# Patient Record
Sex: FEMALE | Race: WHITE | NOT HISPANIC OR LATINO | Employment: OTHER | URBAN - METROPOLITAN AREA
[De-identification: names, ages, dates, MRNs, and addresses within clinical notes are randomized per-mention and may not be internally consistent; named-entity substitution may affect disease eponyms.]

---

## 2017-01-11 ENCOUNTER — OFFICE VISIT - HEALTHEAST (OUTPATIENT)
Dept: INTERNAL MEDICINE | Facility: CLINIC | Age: 72
End: 2017-01-11

## 2017-01-11 DIAGNOSIS — L98.9 SKIN ABNORMALITIES: ICD-10-CM

## 2017-01-11 DIAGNOSIS — E78.5 HYPERLIPEMIA: ICD-10-CM

## 2017-01-11 DIAGNOSIS — M81.0 OSTEOPOROSIS: ICD-10-CM

## 2017-01-11 DIAGNOSIS — Z00.00 HEALTHCARE MAINTENANCE: ICD-10-CM

## 2017-01-11 DIAGNOSIS — I10 HYPERTENSION: ICD-10-CM

## 2017-01-11 LAB
CHOLEST SERPL-MCNC: 201 MG/DL
FASTING STATUS PATIENT QL REPORTED: YES
HDLC SERPL-MCNC: 79 MG/DL
LDLC SERPL CALC-MCNC: 106 MG/DL
TRIGL SERPL-MCNC: 78 MG/DL

## 2017-01-11 ASSESSMENT — MIFFLIN-ST. JEOR: SCORE: 1198.83

## 2017-01-13 ENCOUNTER — COMMUNICATION - HEALTHEAST (OUTPATIENT)
Dept: INTERNAL MEDICINE | Facility: CLINIC | Age: 72
End: 2017-01-13

## 2017-01-17 ENCOUNTER — RECORDS - HEALTHEAST (OUTPATIENT)
Dept: ADMINISTRATIVE | Facility: OTHER | Age: 72
End: 2017-01-17

## 2017-01-18 ENCOUNTER — COMMUNICATION - HEALTHEAST (OUTPATIENT)
Dept: ADMINISTRATIVE | Facility: CLINIC | Age: 72
End: 2017-01-18

## 2017-06-06 ENCOUNTER — RECORDS - HEALTHEAST (OUTPATIENT)
Dept: ADMINISTRATIVE | Facility: OTHER | Age: 72
End: 2017-06-06

## 2017-06-06 ENCOUNTER — RECORDS - HEALTHEAST (OUTPATIENT)
Dept: BONE DENSITY | Facility: CLINIC | Age: 72
End: 2017-06-06

## 2017-06-06 DIAGNOSIS — M81.0 AGE-RELATED OSTEOPOROSIS WITHOUT CURRENT PATHOLOGICAL FRACTURE: ICD-10-CM

## 2017-06-06 DIAGNOSIS — Z78.0 ASYMPTOMATIC MENOPAUSAL STATE: ICD-10-CM

## 2017-06-29 ENCOUNTER — HOSPITAL ENCOUNTER (OUTPATIENT)
Dept: MAMMOGRAPHY | Facility: HOSPITAL | Age: 72
Discharge: HOME | End: 2017-06-29
Attending: INTERNAL MEDICINE

## 2017-06-29 DIAGNOSIS — Z12.31 VISIT FOR SCREENING MAMMOGRAM: ICD-10-CM

## 2017-07-06 ENCOUNTER — COMMUNICATION - HEALTHEAST (OUTPATIENT)
Dept: INTERNAL MEDICINE | Facility: CLINIC | Age: 72
End: 2017-07-06

## 2017-07-06 DIAGNOSIS — I10 UNSPECIFIED ESSENTIAL HYPERTENSION: ICD-10-CM

## 2017-07-06 DIAGNOSIS — E78.5 HYPERLIPEMIA: ICD-10-CM

## 2017-07-13 ENCOUNTER — OFFICE VISIT - HEALTHEAST (OUTPATIENT)
Dept: INTERNAL MEDICINE | Facility: CLINIC | Age: 72
End: 2017-07-13

## 2017-07-13 DIAGNOSIS — M81.0 OSTEOPOROSIS: ICD-10-CM

## 2018-01-18 ENCOUNTER — AMBULATORY - HEALTHEAST (OUTPATIENT)
Dept: NURSING | Facility: CLINIC | Age: 73
End: 2018-01-18

## 2018-01-18 DIAGNOSIS — M81.0 OSTEOPOROSIS: ICD-10-CM

## 2018-03-28 ENCOUNTER — COMMUNICATION - HEALTHEAST (OUTPATIENT)
Dept: INTERNAL MEDICINE | Facility: CLINIC | Age: 73
End: 2018-03-28

## 2018-03-28 DIAGNOSIS — I10 ESSENTIAL HYPERTENSION: ICD-10-CM

## 2018-04-04 ENCOUNTER — COMMUNICATION - HEALTHEAST (OUTPATIENT)
Dept: INTERNAL MEDICINE | Facility: CLINIC | Age: 73
End: 2018-04-04

## 2018-04-04 DIAGNOSIS — E78.5 HYPERLIPEMIA: ICD-10-CM

## 2018-06-18 ENCOUNTER — OFFICE VISIT - HEALTHEAST (OUTPATIENT)
Dept: INTERNAL MEDICINE | Facility: CLINIC | Age: 73
End: 2018-06-18

## 2018-06-18 DIAGNOSIS — M54.50 RIGHT-SIDED LOW BACK PAIN WITHOUT SCIATICA, UNSPECIFIED CHRONICITY: ICD-10-CM

## 2018-07-01 ENCOUNTER — COMMUNICATION - HEALTHEAST (OUTPATIENT)
Dept: INTERNAL MEDICINE | Facility: CLINIC | Age: 73
End: 2018-07-01

## 2018-07-01 DIAGNOSIS — E78.5 HYPERLIPEMIA: ICD-10-CM

## 2018-07-10 ENCOUNTER — HOSPITAL ENCOUNTER (OUTPATIENT)
Dept: MAMMOGRAPHY | Facility: CLINIC | Age: 73
Discharge: HOME | End: 2018-07-10
Attending: INTERNAL MEDICINE

## 2018-07-10 DIAGNOSIS — Z12.31 VISIT FOR SCREENING MAMMOGRAM: ICD-10-CM

## 2018-07-18 ENCOUNTER — OFFICE VISIT - HEALTHEAST (OUTPATIENT)
Dept: INTERNAL MEDICINE | Facility: CLINIC | Age: 73
End: 2018-07-18

## 2018-07-18 DIAGNOSIS — M54.16 LUMBAR RADICULOPATHY: ICD-10-CM

## 2018-07-18 DIAGNOSIS — R20.0 LEFT LEG NUMBNESS: ICD-10-CM

## 2018-07-18 DIAGNOSIS — M81.0 AGE-RELATED OSTEOPOROSIS WITHOUT CURRENT PATHOLOGICAL FRACTURE: ICD-10-CM

## 2018-07-18 ASSESSMENT — MIFFLIN-ST. JEOR: SCORE: 1188.83

## 2018-07-23 ENCOUNTER — COMMUNICATION - HEALTHEAST (OUTPATIENT)
Dept: PHYSICAL MEDICINE AND REHAB | Facility: CLINIC | Age: 73
End: 2018-07-23

## 2018-07-23 ENCOUNTER — HOSPITAL ENCOUNTER (OUTPATIENT)
Dept: PHYSICAL MEDICINE AND REHAB | Facility: CLINIC | Age: 73
Discharge: HOME | End: 2018-07-23
Attending: INTERNAL MEDICINE

## 2018-07-23 DIAGNOSIS — M81.0 AGE-RELATED OSTEOPOROSIS WITHOUT CURRENT PATHOLOGICAL FRACTURE: ICD-10-CM

## 2018-07-23 DIAGNOSIS — M54.16 LUMBAR RADICULITIS: ICD-10-CM

## 2018-07-23 DIAGNOSIS — M79.18 MYOFASCIAL PAIN: ICD-10-CM

## 2018-07-23 ASSESSMENT — MIFFLIN-ST. JEOR: SCORE: 1196.54

## 2018-09-11 ENCOUNTER — HOSPITAL ENCOUNTER (OUTPATIENT)
Dept: PHYSICAL MEDICINE AND REHAB | Facility: CLINIC | Age: 73
Discharge: HOME | End: 2018-09-11
Attending: NURSE PRACTITIONER

## 2018-09-11 DIAGNOSIS — M54.16 LUMBAR RADICULITIS: ICD-10-CM

## 2018-09-23 ENCOUNTER — COMMUNICATION - HEALTHEAST (OUTPATIENT)
Dept: INTERNAL MEDICINE | Facility: CLINIC | Age: 73
End: 2018-09-23

## 2018-09-23 DIAGNOSIS — I10 ESSENTIAL HYPERTENSION: ICD-10-CM

## 2019-04-02 ENCOUNTER — COMMUNICATION - HEALTHEAST (OUTPATIENT)
Dept: INTERNAL MEDICINE | Facility: CLINIC | Age: 74
End: 2019-04-02

## 2019-04-08 ENCOUNTER — COMMUNICATION - HEALTHEAST (OUTPATIENT)
Dept: INTERNAL MEDICINE | Facility: CLINIC | Age: 74
End: 2019-04-08

## 2019-04-17 ENCOUNTER — COMMUNICATION - HEALTHEAST (OUTPATIENT)
Dept: INTERNAL MEDICINE | Facility: CLINIC | Age: 74
End: 2019-04-17

## 2019-04-18 ENCOUNTER — AMBULATORY - HEALTHEAST (OUTPATIENT)
Dept: NURSING | Facility: CLINIC | Age: 74
End: 2019-04-18

## 2019-06-10 ENCOUNTER — RECORDS - HEALTHEAST (OUTPATIENT)
Dept: ADMINISTRATIVE | Facility: OTHER | Age: 74
End: 2019-06-10

## 2019-06-10 ENCOUNTER — RECORDS - HEALTHEAST (OUTPATIENT)
Dept: BONE DENSITY | Facility: CLINIC | Age: 74
End: 2019-06-10

## 2019-06-10 DIAGNOSIS — M81.0 AGE-RELATED OSTEOPOROSIS WITHOUT CURRENT PATHOLOGICAL FRACTURE: ICD-10-CM

## 2019-06-18 ENCOUNTER — COMMUNICATION - HEALTHEAST (OUTPATIENT)
Dept: INTERNAL MEDICINE | Facility: CLINIC | Age: 74
End: 2019-06-18

## 2019-06-18 DIAGNOSIS — E78.5 HYPERLIPEMIA: ICD-10-CM

## 2019-07-15 ENCOUNTER — HOSPITAL ENCOUNTER (OUTPATIENT)
Dept: MAMMOGRAPHY | Facility: CLINIC | Age: 74
Discharge: HOME | End: 2019-07-15
Attending: INTERNAL MEDICINE

## 2019-07-15 DIAGNOSIS — Z12.31 VISIT FOR SCREENING MAMMOGRAM: ICD-10-CM

## 2019-09-18 ENCOUNTER — COMMUNICATION - HEALTHEAST (OUTPATIENT)
Dept: INTERNAL MEDICINE | Facility: CLINIC | Age: 74
End: 2019-09-18

## 2019-09-18 DIAGNOSIS — I10 ESSENTIAL HYPERTENSION: ICD-10-CM

## 2019-10-18 ENCOUNTER — OFFICE VISIT - HEALTHEAST (OUTPATIENT)
Dept: INTERNAL MEDICINE | Facility: CLINIC | Age: 74
End: 2019-10-18

## 2019-10-18 DIAGNOSIS — M81.0 AGE-RELATED OSTEOPOROSIS WITHOUT CURRENT PATHOLOGICAL FRACTURE: ICD-10-CM

## 2019-10-18 DIAGNOSIS — I10 ESSENTIAL HYPERTENSION: ICD-10-CM

## 2019-10-18 DIAGNOSIS — Z00.00 ROUTINE GENERAL MEDICAL EXAMINATION AT A HEALTH CARE FACILITY: ICD-10-CM

## 2019-10-18 DIAGNOSIS — E78.5 HYPERLIPIDEMIA, UNSPECIFIED HYPERLIPIDEMIA TYPE: ICD-10-CM

## 2019-10-18 LAB
ALBUMIN SERPL-MCNC: 4.2 G/DL (ref 3.5–5)
ALBUMIN UR-MCNC: NEGATIVE MG/DL
ALP SERPL-CCNC: 71 U/L (ref 45–120)
ALT SERPL W P-5'-P-CCNC: 15 U/L (ref 0–45)
ANION GAP SERPL CALCULATED.3IONS-SCNC: 9 MMOL/L (ref 5–18)
APPEARANCE UR: CLEAR
AST SERPL W P-5'-P-CCNC: 23 U/L (ref 0–40)
BACTERIA #/AREA URNS HPF: ABNORMAL HPF
BILIRUB SERPL-MCNC: 0.6 MG/DL (ref 0–1)
BILIRUB UR QL STRIP: NEGATIVE
BUN SERPL-MCNC: 19 MG/DL (ref 8–28)
CALCIUM SERPL-MCNC: 10.4 MG/DL (ref 8.5–10.5)
CHLORIDE BLD-SCNC: 102 MMOL/L (ref 98–107)
CHOLEST SERPL-MCNC: 189 MG/DL
CO2 SERPL-SCNC: 30 MMOL/L (ref 22–31)
COLOR UR AUTO: YELLOW
CREAT SERPL-MCNC: 1.03 MG/DL (ref 0.6–1.1)
ERYTHROCYTE [DISTWIDTH] IN BLOOD BY AUTOMATED COUNT: 10.7 % (ref 11–14.5)
FASTING STATUS PATIENT QL REPORTED: YES
GFR SERPL CREATININE-BSD FRML MDRD: 52 ML/MIN/1.73M2
GLUCOSE BLD-MCNC: 102 MG/DL (ref 70–125)
GLUCOSE UR STRIP-MCNC: NEGATIVE MG/DL
HCT VFR BLD AUTO: 43.1 % (ref 35–47)
HDLC SERPL-MCNC: 87 MG/DL
HGB BLD-MCNC: 14.3 G/DL (ref 12–16)
HGB UR QL STRIP: ABNORMAL
KETONES UR STRIP-MCNC: ABNORMAL MG/DL
LDLC SERPL CALC-MCNC: 89 MG/DL
LEUKOCYTE ESTERASE UR QL STRIP: ABNORMAL
MCH RBC QN AUTO: 32.4 PG (ref 27–34)
MCHC RBC AUTO-ENTMCNC: 33.2 G/DL (ref 32–36)
MCV RBC AUTO: 98 FL (ref 80–100)
NITRATE UR QL: NEGATIVE
PH UR STRIP: 6 [PH] (ref 5–8)
PLATELET # BLD AUTO: 266 THOU/UL (ref 140–440)
PMV BLD AUTO: 7.2 FL (ref 7–10)
POTASSIUM BLD-SCNC: 4.1 MMOL/L (ref 3.5–5)
PROT SERPL-MCNC: 7.4 G/DL (ref 6–8)
RBC # BLD AUTO: 4.4 MILL/UL (ref 3.8–5.4)
RBC #/AREA URNS AUTO: ABNORMAL HPF
SODIUM SERPL-SCNC: 141 MMOL/L (ref 136–145)
SP GR UR STRIP: 1.02 (ref 1–1.03)
SQUAMOUS #/AREA URNS AUTO: ABNORMAL LPF
TRIGL SERPL-MCNC: 64 MG/DL
UROBILINOGEN UR STRIP-ACNC: ABNORMAL
WBC #/AREA URNS AUTO: ABNORMAL HPF
WBC CLUMPS #/AREA URNS HPF: PRESENT /[HPF]
WBC: 5.7 THOU/UL (ref 4–11)

## 2019-10-18 ASSESSMENT — MIFFLIN-ST. JEOR: SCORE: 1160.85

## 2019-10-19 LAB — BACTERIA SPEC CULT: NO GROWTH

## 2019-10-21 ENCOUNTER — COMMUNICATION - HEALTHEAST (OUTPATIENT)
Dept: INTERNAL MEDICINE | Facility: CLINIC | Age: 74
End: 2019-10-21

## 2019-10-21 DIAGNOSIS — R31.29 MICROSCOPIC HEMATURIA: ICD-10-CM

## 2019-10-21 LAB — 25(OH)D3 SERPL-MCNC: 40.2 NG/ML (ref 30–80)

## 2019-10-22 ENCOUNTER — COMMUNICATION - HEALTHEAST (OUTPATIENT)
Dept: INTERNAL MEDICINE | Facility: CLINIC | Age: 74
End: 2019-10-22

## 2019-10-29 ENCOUNTER — RECORDS - HEALTHEAST (OUTPATIENT)
Dept: ADMINISTRATIVE | Facility: OTHER | Age: 74
End: 2019-10-29

## 2020-02-12 ENCOUNTER — AMBULATORY - HEALTHEAST (OUTPATIENT)
Dept: INTERNAL MEDICINE | Facility: CLINIC | Age: 75
End: 2020-02-12

## 2020-02-12 DIAGNOSIS — M81.0 AGE-RELATED OSTEOPOROSIS WITHOUT CURRENT PATHOLOGICAL FRACTURE: ICD-10-CM

## 2020-02-12 DIAGNOSIS — Z92.29 PERSONAL HISTORY OF OTHER DRUG THERAPY: ICD-10-CM

## 2020-03-17 ENCOUNTER — COMMUNICATION - HEALTHEAST (OUTPATIENT)
Dept: INTERNAL MEDICINE | Facility: CLINIC | Age: 75
End: 2020-03-17

## 2020-03-17 DIAGNOSIS — E78.5 HYPERLIPEMIA: ICD-10-CM

## 2020-09-12 ENCOUNTER — COMMUNICATION - HEALTHEAST (OUTPATIENT)
Dept: INTERNAL MEDICINE | Facility: CLINIC | Age: 75
End: 2020-09-12

## 2020-09-12 DIAGNOSIS — I10 ESSENTIAL HYPERTENSION: ICD-10-CM

## 2020-12-08 ENCOUNTER — COMMUNICATION - HEALTHEAST (OUTPATIENT)
Dept: INTERNAL MEDICINE | Facility: CLINIC | Age: 75
End: 2020-12-08

## 2020-12-08 DIAGNOSIS — E78.5 HYPERLIPEMIA: ICD-10-CM

## 2020-12-17 ENCOUNTER — COMMUNICATION - HEALTHEAST (OUTPATIENT)
Dept: INTERNAL MEDICINE | Facility: CLINIC | Age: 75
End: 2020-12-17

## 2020-12-17 DIAGNOSIS — I10 ESSENTIAL HYPERTENSION: ICD-10-CM

## 2021-01-08 ENCOUNTER — COMMUNICATION - HEALTHEAST (OUTPATIENT)
Dept: SCHEDULING | Facility: CLINIC | Age: 76
End: 2021-01-08

## 2021-01-13 ENCOUNTER — OFFICE VISIT - HEALTHEAST (OUTPATIENT)
Dept: INTERNAL MEDICINE | Facility: CLINIC | Age: 76
End: 2021-01-13

## 2021-01-13 DIAGNOSIS — M81.0 AGE-RELATED OSTEOPOROSIS WITHOUT CURRENT PATHOLOGICAL FRACTURE: ICD-10-CM

## 2021-01-13 DIAGNOSIS — I10 ESSENTIAL HYPERTENSION: ICD-10-CM

## 2021-01-13 DIAGNOSIS — I87.2 VENOUS (PERIPHERAL) INSUFFICIENCY: ICD-10-CM

## 2021-02-02 ENCOUNTER — RECORDS - HEALTHEAST (OUTPATIENT)
Dept: VASCULAR ULTRASOUND | Facility: CLINIC | Age: 76
End: 2021-02-02

## 2021-02-02 ENCOUNTER — OFFICE VISIT - HEALTHEAST (OUTPATIENT)
Dept: VASCULAR SURGERY | Facility: CLINIC | Age: 76
End: 2021-02-02

## 2021-02-02 DIAGNOSIS — M79.669 PAIN IN UNSPECIFIED LOWER LEG: ICD-10-CM

## 2021-02-02 DIAGNOSIS — M79.669 PAIN AND SWELLING OF LOWER LEG, UNSPECIFIED LATERALITY: ICD-10-CM

## 2021-02-02 DIAGNOSIS — M79.89 OTHER SPECIFIED SOFT TISSUE DISORDERS: ICD-10-CM

## 2021-02-02 DIAGNOSIS — M79.89 PAIN AND SWELLING OF LOWER LEG, UNSPECIFIED LATERALITY: ICD-10-CM

## 2021-02-19 ENCOUNTER — AMBULATORY - HEALTHEAST (OUTPATIENT)
Dept: NURSING | Facility: CLINIC | Age: 76
End: 2021-02-19

## 2021-03-12 ENCOUNTER — AMBULATORY - HEALTHEAST (OUTPATIENT)
Dept: NURSING | Facility: CLINIC | Age: 76
End: 2021-03-12

## 2021-03-14 ENCOUNTER — COMMUNICATION - HEALTHEAST (OUTPATIENT)
Dept: INTERNAL MEDICINE | Facility: CLINIC | Age: 76
End: 2021-03-14

## 2021-03-14 DIAGNOSIS — I10 ESSENTIAL HYPERTENSION: ICD-10-CM

## 2021-03-15 RX ORDER — ATENOLOL 25 MG/1
TABLET ORAL
Qty: 90 TABLET | Refills: 3 | Status: SHIPPED | OUTPATIENT
Start: 2021-03-15 | End: 2022-02-28

## 2021-03-17 ENCOUNTER — COMMUNICATION - HEALTHEAST (OUTPATIENT)
Dept: INTERNAL MEDICINE | Facility: CLINIC | Age: 76
End: 2021-03-17

## 2021-03-17 DIAGNOSIS — E78.5 HYPERLIPEMIA: ICD-10-CM

## 2021-03-19 RX ORDER — SIMVASTATIN 10 MG
TABLET ORAL
Qty: 90 TABLET | Refills: 3 | Status: SHIPPED | OUTPATIENT
Start: 2021-03-19 | End: 2022-03-07

## 2021-05-25 ENCOUNTER — RECORDS - HEALTHEAST (OUTPATIENT)
Dept: ADMINISTRATIVE | Facility: CLINIC | Age: 76
End: 2021-05-25

## 2021-05-26 ENCOUNTER — RECORDS - HEALTHEAST (OUTPATIENT)
Dept: ADMINISTRATIVE | Facility: CLINIC | Age: 76
End: 2021-05-26

## 2021-05-27 ENCOUNTER — RECORDS - HEALTHEAST (OUTPATIENT)
Dept: ADMINISTRATIVE | Facility: CLINIC | Age: 76
End: 2021-05-27

## 2021-05-27 VITALS — SYSTOLIC BLOOD PRESSURE: 132 MMHG | HEART RATE: 76 BPM | DIASTOLIC BLOOD PRESSURE: 70 MMHG | TEMPERATURE: 97.9 F

## 2021-05-27 NOTE — TELEPHONE ENCOUNTER
Spoke with patient and helped her schedule physical and prolia after next. Patient already scheduled next prolia.  Guerita Mondragon CMA ............... 2:15 PM, 04/02/19

## 2021-05-27 NOTE — TELEPHONE ENCOUNTER
"Prolia Injection Phone Screen      Screening questions have been asked 2-3 days prior to administration visit for Prolia. If any questions are answered with \"Yes,\" this phone encounter were will routed to ordering provider for further evaluation.     1.  When was the last injection?  7/18/18    2.  Has insurance for this injection been verified?  Yes    3.  Did you experience any new onset achiness or rashes that lasted for over a month with your previous Prolia injection?   No    4.  Do you have a fever over 101?F or a new deep cough that is unusual for you today? No    5.  Have you started any new medications in the last 6 months that you were told could affect your immune system? These may have been prescribed by oncologist, transplant, rheumatology, or dermatology.   No    6.  In the last 6 months have you have gastric bypass or parathyroid surgery?   No    7.  Do you plan dental work requiring drilling into the bone such as implants/extractions or oral surgery in the next 2-3 months?   No    8. Do you have new insurance since the last injection? Yes no change.     Patient informed if symptoms discussed above present prior to their administration appointment, they are to notify clinic immediately.   Negin Vences CMA  2:00 PM  4/17/2019        "

## 2021-05-27 NOTE — PROGRESS NOTES
The following steps were completed to comply with the REMS program for Prolia:  1. Ordering provider has previously reviewed information in the Medication Guide and Patient Counseling Chart, including the serious risks of Prolia  and the symptoms of each risk and have been advised to seek prompt medical attention if they have signs or symptoms of any of the serious risks.  2. Provided each patient a copy of the Medication Guide and Patient Brochure.  See MAR for administration details.   Indication: Prolia  (denosumab) is a prescription medicine used to treat osteoporosis in patients who:   Are at high risk for fracture, meaning patients who have had a fracture related to osteoporosis, or who have multiple risk factors for fracture; Cannot use another osteoporosis medicine or other osteoporosis medicines did not work well.   The timeline for early/late injections would be 4 weeks early and any time after the 6 month samantha. If a patient receives their injection late, then the subsequent injection would be 6 months from the date that they actually received the injection    Have the screening questions been asked prior to this administration? Yes

## 2021-05-27 NOTE — TELEPHONE ENCOUNTER
Who is calling:  pt  Reason for Call:  Pt trying to confirm the date of her last prolia injection, and trying to figure out when she should schedule the next one.  Date of last appointment with primary care: 7.23.2018  Okay to leave a detailed message: Yes

## 2021-05-28 ENCOUNTER — RECORDS - HEALTHEAST (OUTPATIENT)
Dept: ADMINISTRATIVE | Facility: CLINIC | Age: 76
End: 2021-05-28

## 2021-05-29 NOTE — TELEPHONE ENCOUNTER
Due to be seen    Rx renewed per Medication Renewal Policy. Medication was last renewed on 7/2/18.    Deepika Massey, [facility] Triage/Med Refill 6/18/2019     Requested Prescriptions   Pending Prescriptions Disp Refills     simvastatin (ZOCOR) 10 MG tablet [Pharmacy Med Name: Simvastatin Oral Tablet 10 MG] 90 tablet 2     Sig: TAKE 1 TABLET BY MOUTH EVERY NIGHT AT BEDTIME       Statins Refill Protocol (Hmg CoA Reductase Inhibitors) Passed - 6/18/2019  7:00 AM        Passed - PCP or prescribing provider visit in past 12 months      Last office visit with prescriber/PCP: 7/18/2018 Kushal Motley MD OR same dept: 7/18/2018 Kushal Motley MD OR same specialty: 7/18/2018 Kushal Motley MD  Last physical: 1/11/2017 Last MTM visit: Visit date not found   Next visit within 3 mo: Visit date not found  Next physical within 3 mo: Visit date not found  Prescriber OR PCP: Kushal Motley MD  Last diagnosis associated with med order: 1. Hyperlipemia  - simvastatin (ZOCOR) 10 MG tablet [Pharmacy Med Name: Simvastatin Oral Tablet 10 MG]; TAKE 1 TABLET BY MOUTH EVERY NIGHT AT BEDTIME  Dispense: 90 tablet; Refill: 2    If protocol passes may refill for 12 months if within 3 months of last provider visit (or a total of 15 months).

## 2021-05-30 ENCOUNTER — RECORDS - HEALTHEAST (OUTPATIENT)
Dept: ADMINISTRATIVE | Facility: CLINIC | Age: 76
End: 2021-05-30

## 2021-05-30 VITALS — BODY MASS INDEX: 31.95 KG/M2 | WEIGHT: 169.2 LBS | HEIGHT: 61 IN

## 2021-05-31 ENCOUNTER — RECORDS - HEALTHEAST (OUTPATIENT)
Dept: ADMINISTRATIVE | Facility: CLINIC | Age: 76
End: 2021-05-31

## 2021-05-31 VITALS — BODY MASS INDEX: 31.47 KG/M2 | WEIGHT: 167.9 LBS

## 2021-06-01 VITALS — BODY MASS INDEX: 31.87 KG/M2 | HEIGHT: 61 IN | WEIGHT: 168.8 LBS

## 2021-06-01 VITALS — HEIGHT: 61 IN | BODY MASS INDEX: 31.55 KG/M2 | WEIGHT: 167.1 LBS

## 2021-06-01 VITALS — BODY MASS INDEX: 31.67 KG/M2 | WEIGHT: 169 LBS

## 2021-06-01 NOTE — TELEPHONE ENCOUNTER
RN cannot approve Refill Request    RN can NOT refill this medication Protocol failed and NO refill given.         Deepika Massey, [facility] Triage/Med Refill 9/18/2019    Requested Prescriptions   Pending Prescriptions Disp Refills     atenolol (TENORMIN) 25 MG tablet [Pharmacy Med Name: Atenolol Oral Tablet 25 MG] 90 tablet 3     Sig: Take 1 tablet (25 mg total) by mouth daily.       Beta-Blockers Refill Protocol Failed - 9/18/2019  7:01 AM        Failed - PCP or prescribing provider visit in past 12 months or next 3 months     Last office visit with prescriber/PCP: 7/18/2018 Kushal Motley MD OR same dept: Visit date not found OR same specialty: 7/18/2018 Kushal Motley MD  Last physical: 1/11/2017 Last MTM visit: Visit date not found   Next visit within 3 mo: Visit date not found  Next physical within 3 mo: Visit date not found  Prescriber OR PCP: Kushal Motley MD  Last diagnosis associated with med order: 1. Essential hypertension  - atenolol (TENORMIN) 25 MG tablet [Pharmacy Med Name: Atenolol Oral Tablet 25 MG]; Take 1 tablet (25 mg total) by mouth daily.  Dispense: 90 tablet; Refill: 2    If protocol passes may refill for 12 months if within 3 months of last provider visit (or a total of 15 months).             Failed - Blood pressure filed in past 12 months     BP Readings from Last 1 Encounters:   07/23/18 151/76

## 2021-06-02 ENCOUNTER — RECORDS - HEALTHEAST (OUTPATIENT)
Dept: BONE DENSITY | Facility: CLINIC | Age: 76
End: 2021-06-02

## 2021-06-02 ENCOUNTER — RECORDS - HEALTHEAST (OUTPATIENT)
Dept: ADMINISTRATIVE | Facility: OTHER | Age: 76
End: 2021-06-02

## 2021-06-02 ENCOUNTER — COMMUNICATION - HEALTHEAST (OUTPATIENT)
Dept: INTERNAL MEDICINE | Facility: CLINIC | Age: 76
End: 2021-06-02

## 2021-06-02 DIAGNOSIS — M81.0 AGE-RELATED OSTEOPOROSIS WITHOUT CURRENT PATHOLOGICAL FRACTURE: ICD-10-CM

## 2021-06-02 DIAGNOSIS — Z92.29 PERSONAL HISTORY OF OTHER DRUG THERAPY: ICD-10-CM

## 2021-06-02 NOTE — PROGRESS NOTES
Assessment and Plan:       1. Routine general medical examination at a health care facility  She refused any future colon cancer screenings and we discussed all options.  - Urinalysis-UC if Indicated  - Vitamin D, Total (25-Hydroxy)  - Lipid Profile  - HM2(CBC w/o Differential)  - Comprehensive Metabolic Panel    2. Hypertension    - Urinalysis-UC if Indicated  - Vitamin D, Total (25-Hydroxy)  - Lipid Profile  - HM2(CBC w/o Differential)  - Comprehensive Metabolic Panel    3. Hyperlipidemia, unspecified hyperlipidemia type    - Urinalysis-UC if Indicated  - Vitamin D, Total (25-Hydroxy)  - Lipid Profile  - HM2(CBC w/o Differential)  - Comprehensive Metabolic Panel    4. Age-related osteoporosis without current pathological fracture  Patient was educated on safety of Prolia utilizing Patient Counseling Chart for Healthcare Providers, as outlined by the Prolia REMS progam.     Prolia # 11 given today.  - Urinalysis-UC if Indicated  - Vitamin D, Total (25-Hydroxy)  - Lipid Profile  - HM2(CBC w/o Differential)  - Comprehensive Metabolic Panel     The patient's current medical problems were reviewed.    I have had an Advance Directives discussion with the patient.  The following health maintenance schedule was reviewed with the patient and provided in printed form in the after visit summary:   Health Maintenance   Topic Date Due     HEPATITIS C SCREENING  1945     ZOSTER VACCINES (2 of 3) 02/21/2008     COLONOSCOPY  03/12/2014     TD 18+ HE  12/27/2017     MEDICARE ANNUAL WELLNESS VISIT  01/11/2018     HYPERTENSION FOLLOW-UP  01/13/2018     FALL RISK ASSESSMENT  07/18/2019     INFLUENZA VACCINE RULE BASED (1) 08/01/2019     DXA SCAN  06/10/2021     MAMMOGRAM  07/15/2021     ADVANCE CARE PLANNING  01/11/2022     PNEUMOCOCCAL IMMUNIZATION 65+ LOW/MEDIUM RISK  Completed        Subjective:   Chief Complaint: Zahra Fitzgerald is an 74 y.o. female here for an Annual Wellness visit.   HPI:  Acute and chronic medical  problems discussed as above.    Review of Systems:    Please see above.  The rest of the review of systems are negative for all systems.    Patient Care Team:  Kushal Motley MD as PCP - General (Internal Medicine)  Kushal Motley MD as Assigned PCP     Patient Active Problem List   Diagnosis     Diverticulosis     Hyperlipemia     Hypertension     Obesity     Osteoporosis     No past medical history on file.   Past Surgical History:   Procedure Laterality Date     [place] APPENDECTOMY      Description: Appendectomy;  Recorded: 03/12/2009;      Family History   Problem Relation Age of Onset     Breast cancer Mother         Unsure of age     Breast cancer Maternal Aunt 65     Breast cancer Maternal Aunt 65     Breast cancer Sister 42      Social History     Socioeconomic History     Marital status:      Spouse name: Not on file     Number of children: Not on file     Years of education: Not on file     Highest education level: Not on file   Occupational History     Not on file   Social Needs     Financial resource strain: Not on file     Food insecurity:     Worry: Not on file     Inability: Not on file     Transportation needs:     Medical: Not on file     Non-medical: Not on file   Tobacco Use     Smoking status: Never Smoker   Substance and Sexual Activity     Alcohol use: Not on file     Drug use: Not on file     Sexual activity: Not on file   Lifestyle     Physical activity:     Days per week: Not on file     Minutes per session: Not on file     Stress: Not on file   Relationships     Social connections:     Talks on phone: Not on file     Gets together: Not on file     Attends Spiritism service: Not on file     Active member of club or organization: Not on file     Attends meetings of clubs or organizations: Not on file     Relationship status: Not on file     Intimate partner violence:     Fear of current or ex partner: Not on file     Emotionally abused: Not on file     Physically abused: Not on file     " Forced sexual activity: Not on file   Other Topics Concern     Not on file   Social History Narrative     Not on file      Current Outpatient Medications   Medication Sig Dispense Refill     aspirin 81 mg chewable tablet Chew 81 mg daily.       atenolol (TENORMIN) 25 MG tablet Take 1 tablet (25 mg total) by mouth daily. 90 tablet 3     black cohosh 540 mg cap Take by mouth. Take 1 capsule daily       calcium carbonate-vitamin D3 (CALCIUM 600 + D,3,) 600 mg(1,500mg) -400 unit per tablet Take 1 tablet by mouth daily.       denosumab 60 mg/mL Syrg Inject 60 mg under the skin once.       FOLIC ACID/MULTIVITS-MIN/LUT (CENTRUM SILVER ORAL) Take by mouth. womens ultra       MV,CA,MIN/FA/HERBAL NO.158 (ESTROVEN ENERGY ORAL) Take by mouth.       OMEGA-3/DHA/EPA/FISH OIL (FISH OIL-OMEGA-3 FATTY ACIDS) 300-1,000 mg capsule Take 1 g by mouth daily.       simvastatin (ZOCOR) 10 MG tablet TAKE 1 TABLET BY MOUTH EVERY NIGHT AT BEDTIME 90 tablet 2     vitamin A-vitamin C-vit E-min (OCUVITE) Tab tablet Take 2 tablets by mouth daily.       Current Facility-Administered Medications   Medication Dose Route Frequency Provider Last Rate Last Dose     denosumab 60 mg (PROLIA 60 mg/ml)  60 mg Subcutaneous Q6 Months Kushal Motley MD   60 mg at 04/18/19 0938      Objective:   Vital Signs:   Visit Vitals  /60   Pulse 84   Ht 5' 1\" (1.549 m)   Wt 161 lb 11.2 oz (73.3 kg)   SpO2 100%   BMI 30.55 kg/m         VisionScreening:  No exam data present     PHYSICAL EXAM  General Appearance: Alert, cooperative, no distress, appears stated age.  Head: Normocephalic, without obvious abnormality, atraumatic  Eyes: PERRL, conjunctiva/corneas clear, EOM's intact  Ears: Normal TM's and external ear canals, both ears  Nose: Nares normal, septum midline,mucosa normal, no drainage  Throat: Lips, mucosa, and tongue normal; teeth and gums normal  Neck: Supple, symmetrical, trachea midline, no adenopathy;  thyroid: not enlarged, symmetric, no " tenderness/mass/nodules; no carotid bruit or JVD  Back: Symmetric, no curvature, ROM normal, no CVA tenderness.  Lungs: Clear to auscultation bilaterally, respirations unlabored.  Breasts: No breast masses, tenderness, asymmetry, or nipple discharge.  Heart: Regular rate and rhythm, S1 and S2 normal, no murmur, rub, or gallop.  Abdomen: Soft, non-tender, bowel sounds active all four quadrants,  no masses, no organomegaly.  Pelvic:declined  Extremities: Extremities normal, atraumatic, no cyanosis or edema.  Skin: Skin color, texture, turgor normal, no rashes. She has a chronic lesion on the left ear lobe, for 4-5 years, looks like a scab. She saw Dermatologist in the past and they recommended some cream that she tried.  Lymph nodes: Cervical, supraclavicular, and axillary nodes normal.  Neurologic: No focal neurological findings.      Assessment Results 10/18/2019   Activities of Daily Living No help needed   Instrumental Activities of Daily Living No help needed   Mini Cog Total Score 4   Some recent data might be hidden     A Mini-Cog score of 0-2 suggests the possibility of dementia, score of 3-5 suggests no dementia    Identified Health Risks:     She is at risk for lack of exercise and has been provided with information to increase physical activity for the benefit of her well-being.  Patient's advanced directive was discussed and I am comfortable with the patient's wishes.

## 2021-06-02 NOTE — TELEPHONE ENCOUNTER
----- Message from Kushal Motley MD sent at 10/21/2019 12:34 PM CDT -----  Call the pt. Blood work is good. She can continue same medications. Urine test showed some blood in the urine. I would recommend to see [facility]. Ref is in.

## 2021-06-03 VITALS
HEART RATE: 84 BPM | WEIGHT: 161.7 LBS | DIASTOLIC BLOOD PRESSURE: 60 MMHG | BODY MASS INDEX: 30.53 KG/M2 | HEIGHT: 61 IN | OXYGEN SATURATION: 100 % | SYSTOLIC BLOOD PRESSURE: 118 MMHG

## 2021-06-05 VITALS
WEIGHT: 155.2 LBS | OXYGEN SATURATION: 100 % | HEART RATE: 86 BPM | BODY MASS INDEX: 29.32 KG/M2 | DIASTOLIC BLOOD PRESSURE: 64 MMHG | SYSTOLIC BLOOD PRESSURE: 136 MMHG

## 2021-06-06 NOTE — TELEPHONE ENCOUNTER
Refill Approved    Rx renewed per Medication Renewal Policy. Medication was last renewed on 6/18/19.    Deepika Massey, [facility] Triage/Med Refill 3/17/2020     Requested Prescriptions   Pending Prescriptions Disp Refills     simvastatin (ZOCOR) 10 MG tablet [Pharmacy Med Name: Simvastatin Oral Tablet 10 MG] 90 tablet 1     Sig: TAKE 1 TABLET BY MOUTH EVERY NIGHT AT BEDTIME       Statins Refill Protocol (Hmg CoA Reductase Inhibitors) Passed - 3/17/2020  7:01 AM        Passed - PCP or prescribing provider visit in past 12 months      Last office visit with prescriber/PCP: 7/18/2018 Kushal Motley MD OR same dept: Visit date not found OR same specialty: 7/18/2018 Kushal Motley MD  Last physical: 10/18/2019 Last MTM visit: Visit date not found   Next visit within 3 mo: Visit date not found  Next physical within 3 mo: Visit date not found  Prescriber OR PCP: Kushal Motley MD  Last diagnosis associated with med order: 1. Hyperlipemia  - simvastatin (ZOCOR) 10 MG tablet [Pharmacy Med Name: Simvastatin Oral Tablet 10 MG]; TAKE 1 TABLET BY MOUTH EVERY NIGHT AT BEDTIME  Dispense: 90 tablet; Refill: 1    If protocol passes may refill for 12 months if within 3 months of last provider visit (or a total of 15 months).

## 2021-06-08 NOTE — PROGRESS NOTES
Assessment:     Healthy female exam.   All preventive exams are up-to-date.  Fasting labs will be done today.  She will see Dermatologist for the biopsy of the lesion on her right ear lobe.  Prolia inj given today.    The following high BMI interventions were performed this visit: encouragement to exercise and weight monitoring  This note has been dictated using voice recognition software. Any grammatical or context distortions are unintentional and inherent to the software    1. Osteoporosis  HM2(CBC w/o Differential)    Comprehensive Metabolic Panel    Urinalysis-UC if Indicated    Vitamin D, Total (25-Hydroxy)    Ambulatory referral for Colonoscopy    Lipid Profile   2. Healthcare maintenance  HM2(CBC w/o Differential)    Comprehensive Metabolic Panel    Urinalysis-UC if Indicated    Vitamin D, Total (25-Hydroxy)    Ambulatory referral for Colonoscopy    Lipid Profile   3. Hypertension  HM2(CBC w/o Differential)    Comprehensive Metabolic Panel    Urinalysis-UC if Indicated    Vitamin D, Total (25-Hydroxy)    Ambulatory referral for Colonoscopy    Lipid Profile   4. Hyperlipemia  HM2(CBC w/o Differential)    Comprehensive Metabolic Panel    Urinalysis-UC if Indicated    Vitamin D, Total (25-Hydroxy)    Ambulatory referral for Colonoscopy    Lipid Profile   5. Skin abnormalities  Ambulatory referral to Dermatology         Patient Instructions   Prolia 6th today.  Prolia 7th in 6 months with my nurse. I will see you in 1 year.  DXA in 6/2017 .   Phone number to schedule [phone].  Please avoid any extensive dental work as implants and teeth extractions for the next 1-2 months.  Daily calcium need is 5060-7312 mg a day from the diet and supplements.  Calcium citrate is easier to digest.  Vitamin D 2000 IU daily recommended.      Plan:          Return in about 1 year (around 1/11/2018) for Annual physical.    Subjective:       Chief Complaint   Patient presents with     Annual Exam     dexa-6/3/2015 scheduled,  mammo-6/28/2016, colon-3/12/2004 q10 non fasting        Zahra Fitzgerald is a 71 y.o. female who presents for an annual exam.   Chronic medical problems reviewed.  She is due for Prolia treatment.    Healthy Habits:   Regular Exercise: Yes  Sunscreen Use: Yes  Healthy Diet: Yes  Dental Visits Regularly: Yes  Seat Belt: Yes  Immunization status: up to date and documented.    Health Maintenance   Topic Date Due     COLONOSCOPY  03/12/2014     INFLUENZA VACCINE RULE BASED (1) 08/01/2016     TD 18+ HE  12/27/2017     HYPERTENSION FOLLOW-UP  07/11/2017     FALL RISK ASSESSMENT  01/11/2018     MAMMOGRAM  06/28/2018     ADVANCE DIRECTIVES DISCUSSED WITH PATIENT  01/11/2022     PNEUMOCOCCAL POLYSACCHARIDE VACCINE AGE 65 AND OVER  Completed     PNEUMOCOCCAL CONJUGATE VACCINE FOR ADULTS (PCV13 OR PREVNAR)  Completed     ZOSTER VACCINE  Completed       Social History     Social History     Marital status:      Spouse name: N/A     Number of children: N/A     Years of education: N/A     Occupational History     Not on file.     Social History Main Topics     Smoking status: Never Smoker     Smokeless tobacco: Not on file     Alcohol use Not on file     Drug use: Not on file     Sexual activity: Not on file     Other Topics Concern     Not on file     Social History Narrative       Family History   Problem Relation Age of Onset     Breast cancer Mother      Breast cancer Maternal Aunt      Breast cancer Maternal Aunt      BRCA 1/2 Neg Hx      Endometrial cancer Neg Hx      Ovarian cancer Neg Hx        Patient Active Problem List   Diagnosis     Diverticulosis     Hyperlipidemia     Hypertension     Obesity     Osteoporosis       Current Outpatient Prescriptions on File Prior to Visit   Medication Sig Dispense Refill     aspirin 81 mg chewable tablet Chew 81 mg daily.       atenolol (TENORMIN) 25 MG tablet TAKE 1 TABLET BY MOUTH ONCE DAILY 90 tablet 3     black cohosh 540 mg cap Take by mouth. Take 1 capsule daily    "    calcium carbonate-vitamin D3 (CALCIUM 600 + D,3,) 600 mg(1,500mg) -400 unit per tablet Take 1 tablet by mouth daily.       denosumab 60 mg/mL Syrg Inject 60 mg under the skin once.       FOLIC ACID/MULTIVITS-MIN/LUT (CENTRUM SILVER ORAL) Take by mouth. womens ultra       MV,CA,MIN/FA/HERBAL NO.158 (ESTROVEN ENERGY ORAL) Take by mouth.       OMEGA-3/DHA/EPA/FISH OIL (FISH OIL-OMEGA-3 FATTY ACIDS) 300-1,000 mg capsule Take 1 g by mouth daily.       simvastatin (ZOCOR) 10 MG tablet TAKE 1 TABLET (10 MG) BY ORAL ROUTE ONCE DAILY IN THE EVENING 90 tablet 3     vitamin A-vitamin C-vit E-min (OCUVITE) Tab tablet Take 2 tablets by mouth daily.       [DISCONTINUED] amoxicillin (AMOXIL) 500 MG capsule Take 1 capsule (500 mg total) by mouth 3 (three) times a day. 21 capsule 0     No current facility-administered medications on file prior to visit.        Review of Systems  A 12 point comprehensive review of systems was negative except as noted in HPI.         Objective:      Visit Vitals     /70     Pulse 68     Ht 5' 1.25\" (1.556 m)     Wt 169 lb 3.2 oz (76.7 kg)     BMI 31.71 kg/m2       Body mass index is 31.71 kg/(m^2).        Physical Exam:  General Appearance: Alert, cooperative, no distress, appears stated age.  Head: Normocephalic, without obvious abnormality, atraumatic  Eyes: PERRL, conjunctiva/corneas clear, EOM's intact  Ears: Normal TM's and external ear canals, both ears. She has red sore lesion on the right ear lobe  Nose: Nares normal, septum midline,mucosa normal, no drainage  Throat: Lips, mucosa, and tongue normal; teeth and gums normal  Neck: Supple, symmetrical, trachea midline, no adenopathy;  thyroid: not enlarged, symmetric, no tenderness/mass/nodules; no carotid bruit or JVD  Back: Symmetric, no curvature, ROM normal, no CVA tenderness.  Lungs: Clear to auscultation bilaterally, respirations unlabored.  Breasts: No breast masses, tenderness, asymmetry, or nipple discharge.  Heart: Regular " rate and rhythm, S1 and S2 normal, no murmur, rub, or gallop.  Abdomen: Soft, non-tender, bowel sounds active all four quadrants,  no masses, no organomegaly.  Pelvic:declined  Extremities: Extremities normal, atraumatic, no cyanosis or edema.  Skin: Skin color, texture, turgor normal, no rashes or lesions.  Lymph nodes: Cervical, supraclavicular, and axillary nodes normal.  Neurologic: No focal neurological findings.

## 2021-06-11 NOTE — TELEPHONE ENCOUNTER
Refill Approved    Rx renewed per Medication Renewal Policy. Medication was last renewed on 09/18/2019.  Last office visit was 10/18/2019.  Note sent to pharmacy to schedule appointment in October.    Sandra Catherine, [facility] Triage/Med Refill 9/13/2020     Requested Prescriptions   Pending Prescriptions Disp Refills     atenoloL (TENORMIN) 25 MG tablet [Pharmacy Med Name: Atenolol Oral Tablet 25 MG] 90 tablet 0     Sig: Take 1 tablet (25 mg total) by mouth daily.       Beta-Blockers Refill Protocol Passed - 9/12/2020  2:01 AM        Passed - PCP or prescribing provider visit in past 12 months or next 3 months     Last office visit with prescriber/PCP: 7/18/2018 Kushal Motlye MD OR same dept: Visit date not found OR same specialty: 7/18/2018 Kushal Motley MD  Last physical: 10/18/2019 Last MTM visit: Visit date not found   Next visit within 3 mo: Visit date not found  Next physical within 3 mo: Visit date not found  Prescriber OR PCP: Kushal Motley MD  Last diagnosis associated with med order: 1. Essential hypertension  - atenoloL (TENORMIN) 25 MG tablet [Pharmacy Med Name: Atenolol Oral Tablet 25 MG]; Take 1 tablet (25 mg total) by mouth daily.  Dispense: 90 tablet; Refill: 0    If protocol passes may refill for 12 months if within 3 months of last provider visit (or a total of 15 months).             Passed - Blood pressure filed in past 12 months     BP Readings from Last 1 Encounters:   10/18/19 118/60

## 2021-06-11 NOTE — PROGRESS NOTES
1. Osteoporosis         Return in about 6 months (around 1/13/2018) for Recheck.    Patient Instructions   Prolia 7th today.  Prolia 8th in 6 months with my nurse. I will see you in 1 year.  DXA in 6/2019 .   Phone number to schedule [phone].  Please avoid any extensive dental work as implants and teeth extractions for the next 1-2 months.  Daily calcium need is 5766-0022 mg a day from the diet and supplements.  Calcium citrate is easier to digest.  Vitamin D 2000 IU daily recommended.      Chief Complaint   Patient presents with     Osteoporosis Follow Up       /70  Pulse 72  Wt 167 lb 14.4 oz (76.2 kg)  BMI 31.47 kg/m2      Did you experience any problems with previous Prolia injection? no  Any medication change in the last 6 months? no  Did you take prednisone or other immunosupressant drugs in the last 6 months   (chemo, transplant, rheum, dermatology conditions)? no  Did you have any serious infection in the last 6 months?no  Any recent hospitalizations?no  Do you plan any dental work in the next 2-3 months?no  How much calcium do you take daily from the diet and supplements?1200 mg  How much vit D do you take daily? 2000 IU  Last DXA?6/2017      Patient is here today for the 7th Prolia injection. Patient tolerated previous injections well.   We discussed calcium and vit D daily needs today.   Next Prolia injection will be in 6 months.     15 minutes spent with the patient and more then 50 % of the time in counseling.  This note has been dictated using voice recognition software. Any grammatical or context distortions are unintentional and inherent to the software      Patient Active Problem List   Diagnosis     Diverticulosis     Hyperlipemia     Hypertension     Obesity     Osteoporosis       Current Outpatient Prescriptions on File Prior to Visit   Medication Sig Dispense Refill     aspirin 81 mg chewable tablet Chew 81 mg daily.       atenolol (TENORMIN) 25 MG tablet TAKE 1 TABLET BY MOUTH  ONCE DAILY 90 tablet 2     black cohosh 540 mg cap Take by mouth. Take 1 capsule daily       calcium carbonate-vitamin D3 (CALCIUM 600 + D,3,) 600 mg(1,500mg) -400 unit per tablet Take 1 tablet by mouth daily.       denosumab 60 mg/mL Syrg Inject 60 mg under the skin once.       FOLIC ACID/MULTIVITS-MIN/LUT (CENTRUM SILVER ORAL) Take by mouth. womens ultra       MV,CA,MIN/FA/HERBAL NO.158 (ESTROVEN ENERGY ORAL) Take by mouth.       OMEGA-3/DHA/EPA/FISH OIL (FISH OIL-OMEGA-3 FATTY ACIDS) 300-1,000 mg capsule Take 1 g by mouth daily.       simvastatin (ZOCOR) 10 MG tablet TAKE 1 TABLET (10 MG) BY ORAL ROUTE ONCE DAILY IN THE EVENING 90 tablet 2     vitamin A-vitamin C-vit E-min (OCUVITE) Tab tablet Take 2 tablets by mouth daily.       No current facility-administered medications on file prior to visit.

## 2021-06-13 NOTE — TELEPHONE ENCOUNTER
RN cannot approve Refill Request    RN can NOT refill this medication PCP messaged that patient is overdue for Office Visit. Last office visit: 7/18/2018 Kushal Motley MD Last Physical: 10/18/2019 Last MTM visit: Visit date not found Last visit same specialty: 7/18/2018 Kushal Motley MD.  Next visit within 3 mo: Visit date not found  Next physical within 3 mo: Visit date not found      Katie Gardiner, [facility] Triage/Med Refill 12/19/2020    Requested Prescriptions   Pending Prescriptions Disp Refills     atenoloL (TENORMIN) 25 MG tablet [Pharmacy Med Name: Atenolol Oral Tablet 25 MG] 90 tablet 0     Sig: Take 1 tablet (25 mg total) by mouth ONCE daily.       Beta-Blockers Refill Protocol Failed - 12/17/2020  2:18 PM        Failed - PCP or prescribing provider visit in past 12 months or next 3 months     Last office visit with prescriber/PCP: 7/18/2018 Kushal Motley MD OR same dept: Visit date not found OR same specialty: 7/18/2018 Kushal Motley MD  Last physical: 10/18/2019 Last MTM visit: Visit date not found   Next visit within 3 mo: Visit date not found  Next physical within 3 mo: Visit date not found  Prescriber OR PCP: Kushal Motley MD  Last diagnosis associated with med order: 1. Essential hypertension  - atenoloL (TENORMIN) 25 MG tablet [Pharmacy Med Name: Atenolol Oral Tablet 25 MG]; Take 1 tablet (25 mg total) by mouth ONCE daily.  Dispense: 90 tablet; Refill: 0    If protocol passes may refill for 12 months if within 3 months of last provider visit (or a total of 15 months).             Failed - Blood pressure filed in past 12 months     BP Readings from Last 1 Encounters:   10/18/19 118/60

## 2021-06-13 NOTE — TELEPHONE ENCOUNTER
RN cannot approve Refill Request    RN can NOT refill this medication Protocol failed and NO refill given. Last office visit: 7/18/2018 Kushal Motley MD Last Physical: 10/18/2019 Last MTM visit: Visit date not found Last visit same specialty: 7/18/2018 Kushal Motley MD.  Next visit within 3 mo: Visit date not found  Next physical within 3 mo: Visit date not found      Deepika Massey, [facility] Triage/Med Refill 12/9/2020    Requested Prescriptions   Pending Prescriptions Disp Refills     simvastatin (ZOCOR) 10 MG tablet [Pharmacy Med Name: Simvastatin Oral Tablet 10 MG] 90 tablet 0     Sig: TAKE 1 TABLET BY MOUTH EVERY NIGHT AT BEDTIME       Statins Refill Protocol (Hmg CoA Reductase Inhibitors) Failed - 12/8/2020  5:33 PM        Failed - PCP or prescribing provider visit in past 12 months      Last office visit with prescriber/PCP: 7/18/2018 Kushal Motley MD OR same dept: Visit date not found OR same specialty: 7/18/2018 Kushal Motley MD  Last physical: 10/18/2019 Last MTM visit: Visit date not found   Next visit within 3 mo: Visit date not found  Next physical within 3 mo: Visit date not found  Prescriber OR PCP: Kushal Motley MD  Last diagnosis associated with med order: 1. Hyperlipemia  - simvastatin (ZOCOR) 10 MG tablet [Pharmacy Med Name: Simvastatin Oral Tablet 10 MG]; TAKE 1 TABLET BY MOUTH EVERY NIGHT AT BEDTIME  Dispense: 90 tablet; Refill: 0    If protocol passes may refill for 12 months if within 3 months of last provider visit (or a total of 15 months).

## 2021-06-14 NOTE — PATIENT INSTRUCTIONS - HE
Schedule DXA scan in 6/2021 - [phone].  We will discuss need for Prolia in June when you come for physical and we see DXA scan.    Compression stockings - put in in the morning, take off at bedtime.  Elevate legs when resting and sleeping and <2g salt/day  Please schedule appointment with Vascular clinic.

## 2021-06-14 NOTE — PROGRESS NOTES
This is a new consult for Varicose Veins. The patient has varicose veins that are problematic in right legs. Symptoms patient has been experiencing are aching, cramps, discoloration and  swelling. Patient has not been wearing compression stockings.     Discoloration  is present.  Pt  has been using pain medication or antiinflammatory's.

## 2021-06-14 NOTE — TELEPHONE ENCOUNTER
She was seen in the hospital for her swollen legs. She was reading her discharge paper and she realized she had an appointment at 3:45 today with  that no one told her about. I talked with the clinic, and she can see her PCP at 10 am on Monday.  She reports getting an injection before she left the hospital, but not sure it was her denofumab injection or not. AVS says no meds given in ED.     Love Reyes RN, Nurse Advisor      Additional Information    Question about upcoming scheduled test, no triage required and triager able to answer question    Protocols used: INFORMATION ONLY CALL-A-AH

## 2021-06-14 NOTE — TELEPHONE ENCOUNTER
Triage call:     Right foot is swollen up to her knee and by her toes   - reports that her toes appear bruised and dark   On going for about 2 weeks- no falls or injuries- states one day she just woke up with swelling  Waxes and wanes- reports that sometimes the swelling is less  Reports that she has some swelling in her left leg but not as bad as the right   No blood thinners  No drainage   - some pain reported in the leg  Able to walk currently, specialty shoes are tight  Reports that the right foot is cooler than the left foot    No chest pain or trouble breathing     Advised her to be seen in the ER now- concern for decreased circulation in her foot and the need for immediate evaluation. Did not pursue second level triage based on emergent nature of symptoms reported. Patient agrees to be seen at [facility] and will have her  take her there now.     Yenni Benson RN BSBA [facility] Triage/Med Refill 1/8/2021 10:20 AM    COVID 19 Nurse Triage Plan/Patient Instructions    Please be aware that novel coronavirus (COVID-19) may be circulating in the community. If you develop symptoms such as fever, cough, or SOB or if you have concerns about the presence of another infection including coronavirus (COVID-19), please contact your health care provider or visit [email or web address].     Disposition/Instructions    ED Visit recommended. Follow protocol based instructions.      Bring Your Own Device:  Please also bring your smart device(s) (smart phones, tablets, laptops) and their charging cables for your personal use and to communicate with your care team during your visit.      Thank you for taking steps to prevent the spread of this virus.  o Limit your contact with others.  o Wear a simple mask to cover your cough.  o Wash your hands well and often.    Resources     [facility]: About COVID-19: [email or web address]    CDC: What to Do If You're Sick:  www.cdc.gov/coronavirus/2019-ncov/about/steps-when-sick.html    CDC: Ending Home Isolation: www.cdc.gov/coronavirus/2019-ncov/hcp/disposition-in-home-patients.html     CDC: Caring for Someone: www.cdc.gov/coronavirus/2019-ncov/if-you-are-sick/care-for-someone.html     [facility]: Interim Guidance for Hospital Discharge to Home: [email or web address]/hcp/hospdischarge.pdf    [facility] clinical trials (COVID-19 research studies): [email or web address]-clinical-trials     Below are the COVID-19 hotlines at the [place] Department of Health ([facility]). Interpreters are available.   o For health questions: Call 518-302-2693 or 1-855.112.8796 (7 a.m. to 7 p.m.)  o For questions about schools and childcare: Call 244-365-4816 or 1-296.373.1065 (7 a.m. to 7 p.m.)                 Reason for Disposition    Entire foot is cool or blue in comparison to other side    Additional Information    Negative: Sounds like a life-threatening emergency to the triager    Negative: Chest pain    Negative: Small area of swelling and followed an insect bite to the area    Negative: Followed a knee injury    Negative: Ankle or foot injury    Negative: Pregnant with leg swelling or edema    Negative: Difficulty breathing at rest    Protocols used: LEG SWELLING AND EDEMA-A-OH

## 2021-06-14 NOTE — PROGRESS NOTES
Assessment/Plan:        1. Age-related osteoporosis without current pathological fracture  DXA Bone Density Scan   2. Venous (peripheral) insufficiency  Compression stockings 20/30 mmHg; Knee    Ambulatory referral to Vascular Surgery - [facility] ([place])   3. Hypertension       1. Venous insufficiency - compression stocking Rx provided. She will see vascular clinic.  2. Osteoporosis - will get DXA scan in June, I will see her in July for physical and we will discuss if she needs Prolia in the future.  3. HTN, stable on atenolol.    This note has been dictated using voice recognition software. Any grammatical or context distortions are unintentional and inherent to the software.      Return in about 6 months (around 7/13/2021) for Annual physical.    Patient Instructions   Schedule DXA scan in 6/2021 - [phone].  We will discuss need for Prolia in June when you come for physical and we see DXA scan.    Compression stockings - put in in the morning, take off at bedtime.  Elevate legs when resting and sleeping and <2g salt/day  Please schedule appointment with Vascular clinic.          Subjective:    Zahra Fitzgerald is a 75 y.o. female  here for    Chief Complaint   Patient presents with     ED Follow Up     Discuss injection she may need     Follow up few issues:  1. ER visit on 1/8/21 for bilat leg edema. R>L that has been intermittent for the past 3 weeks.She denies any calf tenderness. No history of DVT/PE. Not anticoagulated. Denies history of heart failure. Denies fevers, chills, body aches, overlying erythema or warmth. Denies injury.   Venous US negative for DVT.  Xray right foot IMPRESSION:   Interval development soft tissue swelling throughout the right foot and ankle. Fusion with plate and screw and longitudinal cannulated screw right first MTP joint appear intact and unchanged. Slight cortical irregularity at the base of the   proximal phalanx right second toe observed  on the oblique projection only is probably due to overlap of normal structures. Degenerative change right first MTP joint. Small plantar calcaneal spur. Right foot otherwise negative.  Component      Latest Ref Rng & Units 1/8/2021   Sodium      136 - 145 mmol/L 142   Potassium      3.5 - 5.0 mmol/L 3.6   Chloride      98 - 107 mmol/L 105   CO2      22 - 31 mmol/L 27   Anion Gap, Calculation      5 - 18 mmol/L 10   Glucose      70 - 125 mg/dL 118   Calcium      8.5 - 10.5 mg/dL 9.3   BUN      8 - 28 mg/dL 20   Creatinine      0.60 - 1.10 mg/dL 0.95   GFR MDRD Af Amer      >60 mL/min/1.73m2 >60   GFR MDRD Non Af Amer      >60 mL/min/1.73m2 57 (L)   WBC      4.0 - 11.0 thou/uL 6.7   RBC      3.80 - 5.40 mill/uL 4.13   Hemoglobin      12.0 - 16.0 g/dL 12.8   Hematocrit      35.0 - 47.0 % 39.6   MCV      80 - 100 fL 96   MCH      27.0 - 34.0 pg 31.0   MCHC      32.0 - 36.0 g/dL 32.3   RDW      11.0 - 14.5 % 13.0   Platelets      140 - 440 thou/uL 233   MPV      8.5 - 12.5 fL 9.2   BNP      0 - 137 pg/mL 106     2. Osteoporosis - had Prolia #11 in 10/2019. She missed dose in march and October 2020.     3. HTN, stable on atenolol.    Social History     Socioeconomic History     Marital status:      Spouse name: Not on file     Number of children: Not on file     Years of education: Not on file     Highest education level: Not on file   Occupational History     Not on file   Social Needs     Financial resource strain: Not on file     Food insecurity     Worry: Not on file     Inability: Not on file     Transportation needs     Medical: Not on file     Non-medical: Not on file   Tobacco Use     Smoking status: Never Smoker   Substance and Sexual Activity     Alcohol use: Not on file     Drug use: Not on file     Sexual activity: Not on file   Lifestyle     Physical activity     Days per week: Not on file     Minutes per session: Not on file     Stress: Not on file   Relationships     Social connections     Talks on  phone: Not on file     Gets together: Not on file     Attends Yarsani service: Not on file     Active member of club or organization: Not on file     Attends meetings of clubs or organizations: Not on file     Relationship status: Not on file     Intimate partner violence     Fear of current or ex partner: Not on file     Emotionally abused: Not on file     Physically abused: Not on file     Forced sexual activity: Not on file   Other Topics Concern     Not on file   Social History Narrative     Not on file       Family History   Problem Relation Age of Onset     Breast cancer Mother         Unsure of age     Breast cancer Maternal Aunt 65     Breast cancer Maternal Aunt 65     Breast cancer Sister 42     Review of Systems:     A 12 point comprehensive review of systems was negative except as noted in HPI.            Objective:    Physical Exam   /64   Pulse 86   Wt 155 lb 3.2 oz (70.4 kg)   SpO2 100%   BMI 29.32 kg/m      Constitutional: oriented to person, place, and time, appears well-nourished. No distress.   HENT:   Head: Normocephalic.   Mouth/Throat: Oropharynx is clear and moist.   Eyes: Conjunctivae are normal. Pupils are equal, round, and reactive to light.   Neck: Normal range of motion. Neck supple.   Cardiovascular: Normal rate, regular rhythm and normal heart sounds.    Pulmonary/Chest: Effort normal and breath sounds normal.  Abdominal: Soft. Bowel sounds are normal.   Musculoskeletal: Normal range of motion in both lower legs, ankles, feet. Gait normal. Bilat leg edema R>L. No pain, Homman's negative, no redness, has some spider angiomas on the dorsum of right foot.   Neurological: alert and oriented to person, place, and time.  Psychiatric:  normal mood and affect.    Patient Active Problem List   Diagnosis     Diverticulosis     Hyperlipemia     Hypertension     Osteoporosis       Current Outpatient Medications on File Prior to Visit   Medication Sig Dispense Refill     aspirin 81 mg  chewable tablet Chew 81 mg daily.       atenoloL (TENORMIN) 25 MG tablet Take 1 tablet (25 mg total) by mouth ONCE daily. 90 tablet 0     black cohosh 540 mg cap Take by mouth. Take 1 capsule daily       calcium carbonate-vitamin D3 (CALCIUM 600 + D,3,) 600 mg(1,500mg) -400 unit per tablet Take 1 tablet by mouth daily.       denosumab 60 mg/mL Syrg Inject 60 mg under the skin once.       FOLIC ACID/MULTIVITS-MIN/LUT (CENTRUM SILVER ORAL) Take by mouth. womens ultra       MV,CA,MIN/FA/HERBAL NO.158 (ESTROVEN ENERGY ORAL) Take by mouth.       OMEGA-3/DHA/EPA/FISH OIL (FISH OIL-OMEGA-3 FATTY ACIDS) 300-1,000 mg capsule Take 1 g by mouth daily.       simvastatin (ZOCOR) 10 MG tablet TAKE 1 TABLET BY MOUTH EVERY NIGHT AT BEDTIME 90 tablet 0     vitamin A-vitamin C-vit E-min (OCUVITE) Tab tablet Take 2 tablets by mouth daily.       Current Facility-Administered Medications on File Prior to Visit   Medication Dose Route Frequency Provider Last Rate Last Admin     denosumab 60 mg (PROLIA 60 mg/ml)  60 mg Subcutaneous Q6 Months Kushal Motley MD Maja Visekruna  1/13/2021

## 2021-06-15 NOTE — TELEPHONE ENCOUNTER
Refill Approved    Rx renewed per Medication Renewal Policy. Medication was last renewed on 12/20/20.    Alexandre Garcia, [facility] Triage/Med Refill 3/15/2021     Requested Prescriptions   Pending Prescriptions Disp Refills     atenoloL (TENORMIN) 25 MG tablet [Pharmacy Med Name: Atenolol Oral Tablet 25 MG] 90 tablet 0     Sig: TAKE ONE TABLET BY MOUTH ONE TIME DAILY       Beta-Blockers Refill Protocol Passed - 3/14/2021 11:16 AM        Passed - PCP or prescribing provider visit in past 12 months or next 3 months     Last office visit with prescriber/PCP: 1/13/2021 Kushal Motley MD OR same dept: 1/13/2021 Kushal Motley MD OR same specialty: 1/13/2021 Kushal Motley MD  Last physical: 10/18/2019 Last MTM visit: Visit date not found   Next visit within 3 mo: Visit date not found  Next physical within 3 mo: Visit date not found  Prescriber OR PCP: Kushal Motley MD  Last diagnosis associated with med order: 1. Essential hypertension  - atenoloL (TENORMIN) 25 MG tablet [Pharmacy Med Name: Atenolol Oral Tablet 25 MG]; TAKE ONE TABLET BY MOUTH ONE TIME DAILY   Dispense: 90 tablet; Refill: 0    If protocol passes may refill for 12 months if within 3 months of last provider visit (or a total of 15 months).             Passed - Blood pressure filed in past 12 months     BP Readings from Last 1 Encounters:   02/02/21 132/70

## 2021-06-15 NOTE — PROGRESS NOTES
Chief Complaint   Patient presents with     Prolia #8     1. Did you experience any problems with previous Prolia injection? NO  2. Do you feel sick today?(fever, RS, GI,  issues)? NO  3. Any medication changes in the last 6 months? NO  4. Did you take prednisone or other immunosuppressant drugs in the last 6 months?(chemo, transplant, rheu, dermatology conditions)? NO  5. Did you have any serious infection in the last 6 months? (pancreatitis) NO  6. Any recent hospitalizations/ surgeries (especially gastric bypass, thyroid, parathyroid)? NO  7. Do you plan any dental work?(especially implants and extractions) in the next 2-3 months? NO  8. Did you have any fractures in the last year? NO    Yearly F/U appointment  with Dr. Motley is made.     Sarah Yepez CMA [facility] 1/18/2018 10:08 AM

## 2021-06-16 NOTE — TELEPHONE ENCOUNTER
Refill Approved    Rx renewed per Medication Renewal Policy. Medication was last renewed on 12/9/20.    Alexandre Garcia, [facility] Connection Triage/Med Refill 3/19/2021     Requested Prescriptions   Pending Prescriptions Disp Refills     simvastatin (ZOCOR) 10 MG tablet [Pharmacy Med Name: Simvastatin Oral Tablet 10 MG] 90 tablet 0     Sig: TAKE ONE TABLET BY MOUTH AT BEDTIME       Statins Refill Protocol (Hmg CoA Reductase Inhibitors) Passed - 3/17/2021  3:01 PM        Passed - PCP or prescribing provider visit in past 12 months      Last office visit with prescriber/PCP: Visit date not found OR same dept: 1/13/2021 Kushal Motley MD OR same specialty: 1/13/2021 Kushal Motley MD  Last physical: Visit date not found Last MTM visit: Visit date not found   Next visit within 3 mo: Visit date not found  Next physical within 3 mo: Visit date not found  Prescriber OR PCP: Jarrell Mena MD  Last diagnosis associated with med order: 1. Hyperlipemia  - simvastatin (ZOCOR) 10 MG tablet [Pharmacy Med Name: Simvastatin Oral Tablet 10 MG]; TAKE ONE TABLET BY MOUTH AT BEDTIME   Dispense: 90 tablet; Refill: 0    If protocol passes may refill for 12 months if within 3 months of last provider visit (or a total of 15 months).

## 2021-06-17 ENCOUNTER — AMBULATORY - HEALTHEAST (OUTPATIENT)
Dept: NURSING | Facility: CLINIC | Age: 76
End: 2021-06-17

## 2021-06-17 NOTE — PATIENT INSTRUCTIONS - HE
Patient Instructions by Kushal Motley MD at 10/18/2019 12:40 PM     Author: Kushal Motley MD Service: -- Author Type: Physician    Filed: 10/18/2019  1:20 PM Encounter Date: 10/18/2019 Status: Addendum    : Kushal Motley MD (Physician)    Related Notes: Original Note by Kushal Motley MD (Physician) filed at 10/18/2019  1:12 PM       Prolia 11th today.  Prolia 12th in 6 months with my nurse. I will see you in 1 year for AWV.    DXA in 6/2021 .   Phone number to schedule [phone].    Daily calcium need is 1293-9031 mg a day from the diet and supplements.  Calcium citrate is easier to digest.  Vitamin D 2000 IU daily recommended.    Tdap and labs today.        Patient Education     Exercise for a Healthier Heart  You may wonder how you can improve the health of your heart. If youre thinking about exercise, youre on the right track. You dont need to become an athlete, but you do need a certain amount of brisk exercise to help strengthen your heart. If you have been diagnosed with a heart condition, your doctor may recommend exercise to help stabilize your condition. To help make exercise a habit, choose safe, fun activities.       Be sure to check with your health care provider before starting an exercise program.    Why exercise?  Exercising regularly offers many healthy rewards. It can help you do all of the following:    Improve your blood cholesterol levels to help prevent further heart trouble    Lower your blood pressure to help prevent a stroke or heart attack    Control diabetes, or reduce your risk of getting this disease    Improve your heart and lung function    Reach and maintain a healthy weight    Make your muscles stronger and more limber so you can stay active    Prevent falls and fractures by slowing the loss of bone mass (osteoporosis)    Manage stress better  Exercise tips  Ease into your routine. Set small goals. Then build on them.  Exercise on most days. Aim for a total of 150  or more minutes of moderate to  vigorous intensity activity each week. Consider 40 minutes, 3 to 4 times a week. For best results, activity should last for 40 minutes on average. It is OK to work up to the 40 minute period over time. Examples of moderate-intensity activity is walking one mile in 15 minutes or 30 to 45 minutes of yard work.  Step up your daily activity level. Along with your exercise program, try being more active throughout the day. Walk instead of drive. Do more household tasks or yard work.  Choose one or more activities you enjoy. Walking is one of the easiest things you can do. You can also try swimming, riding a bike, or taking an exercise class.  Stop exercising and call your doctor if you:    Have chest pain or feel dizzy or lightheaded    Feel burning, tightness, pressure, or heaviness in your chest, neck, shoulders, back, or arms    Have unusual shortness of breath    Have increased joint or muscle pain    Have palpitations or an irregular heartbeat      5280-0807 [employer]. [address] [place] [address]. All rights reserved. This information is not intended as a substitute for professional medical care. Always follow your healthcare professional's instructions.           Advance Directive  Patients advance directive was discussed and I am comfortable with the patients wishes.  Patient Education   Personalized Prevention Plan  You are due for the preventive services outlined below.  Your care team is available to assist you in scheduling these services.  If you have already completed any of these items, please share that information with your care team to update in your medical record.  Health Maintenance   Topic Date Due   ? HEPATITIS C SCREENING  1945   ? ZOSTER VACCINES (2 of 3) 02/21/2008   ? COLONOSCOPY  03/12/2014   ? TD 18+ HE  12/27/2017   ? MEDICARE ANNUAL WELLNESS VISIT  01/11/2018   ? HYPERTENSION FOLLOW-UP  01/13/2018   ? FALL RISK ASSESSMENT   07/18/2019   ? INFLUENZA VACCINE RULE BASED (1) 08/01/2019   ? DXA SCAN  06/10/2021   ? MAMMOGRAM  07/15/2021   ? ADVANCE CARE PLANNING  01/11/2022   ? PNEUMOCOCCAL IMMUNIZATION 65+ LOW/MEDIUM RISK  Completed

## 2021-06-18 NOTE — PATIENT INSTRUCTIONS - HE
"Patient Instructions by Elizabeth Macias LPN at 2/2/2021 10:00 AM     Author: Elizabeth Macias LPN Service: -- Author Type: Licensed Nurse    Filed: 2/2/2021 11:17 AM Encounter Date: 2/2/2021 Status: Signed    : Elizabeth Macias LPN (Licensed Nurse)       We are prescribing some compression stockings for you. I have included different suppliers that should help you get measured and fitting to ensure proper fitting socks. You should wear these socks as much as you can. It is especially important to wear them with long periods of sitting/standing, long car rides or if you will be flying. Compression socks should get refilled every 4-6 months. They do not need to be worn at night while in bed.    If you do a lot of standing it is good to do calf raises to help keep the blood pumping. If you sit a lot at work it is good to get up periodically to walk around. Elevation of the foot of your bed 4-6\" helps the blood return back to where it is needed.      Varicose Veins  Varicose veins are swollen, enlarged veins most often found in the legs. They are usually blue or purple in color and may bulge, twist, and stand out under the skin.  Normally, veins return blood from the body to the heart. The leg veins have one-way valves that prevent blood from flowing backward in the vein. When the valves are weak or damaged, blood backs up in the veins. This may cause some of the veins to swell and bulge and become varicose veins.  Symptoms  Varicose veins may or may not cause symptoms. If symptoms do occur, they can include:    Legs that feel tired, achy, heavy, or itchy    Leg muscle cramps    Skin changes, such as discoloration, dryness, redness, or rash (in more severe cases, you may also have sores on the skin called venous leg ulcers)  Risk Factors  There are a number of factors that increase the risk for varicose veins. These can include:    Being a woman    Being older    Sitting or standing for long " periods    Being overweight    Being pregnant    Having a family history of varicose veins  Treatment begins with simple self-help measures (see below). If these dont help, there are many procedures that can be done to shrink or remove varicose veins. Your healthcare provider can tell you more about these options, if needed.  Home care    Support or compression stockings will likely be prescribed. If so, be sure to wear them as directed. They may help improve blood flow.    Exercising helps strengthen your leg muscles and improve blood flow. To get the most benefit, choose exercises such as walking, swimming, or cycling. Also try to exercise for at least 30 minutes on most days.    Raising (elevating) your legs lets gravity help blood flow back to the heart. Sit or lie with your feet above heart level a few times throughout the day, or as directed.    Avoid long periods of sitting or standing. Change positions often. Also, move your ankles, toes and knees often. This may also help improve blood flow.    If you are overweight, talk with your healthcare provider about setting up a weight-loss plan. Maintaining a healthy weight can help reduce the strain on your veins. It may also improve symptoms, such as swelling and aching.    If you have dryness and itching, ask your provider about special lotions that can be applied to the skin to help improve symptoms.  Follow-up care  Follow up with your healthcare provider, or as directed. If imaging tests were done, youll be told the results and if there are any new findings that affect your care.  When to seek medical advice  Call your healthcare provider right away if any of these occur:    Sudden, severe leg swelling, pain, or redness    Symptoms worsen, or they dont improve with self-care    Bleeding from any affected veins    Ulcers form on the legs, ankles, or feet    Fever of 100.4 F (38 C) or higher, or as advised by your provider  Understanding Spider and Varicose  Veins  Do you often hide your legs because of the way they look? You may have noticed tiny red or blue bursts (spider veins). Or maybe you have veins that bulge or look twisted (varicose veins). If so, there are treatments that can help  What are the symptoms?  Spider veins or varicose veins may never be a problem. But sometimes they can cause legs to ache or swell. Your legs may also feel heavy and tired, or like theyre burning. These symptoms may be more severe at the end of the day. Prolonged sitting or standing can also make your symptoms worse.  Who gets spider and varicose veins?  Anyone can get spider or varicose veins. But vein problems tend to be hereditary (run in families). Other factors that can affect veins include:    Pregnancy, hormones, and birth control pills    A job where you stand or sit a lot    Extra weight or lack of exercise    Age         Spider veins look like tiny webs on the ankles, legs, and upper thighs.       Ropy, dark blue, red, or flesh-colored varicose veins are most common on the thighs, calves, and feet.    What can be done?  Spider and varicose veins can affect the way you feel about yourself. Talk to your healthcare provider about your concerns. There are treatments that can ease symptoms and make your legs look better.  Your treatment choices  Treatment may include self-care, sclerotherapy (injecting veins with a chemical), surgery, or newer nonsurgical minimally invasive therapies. Spider veins and some varicose veins can be treated with sclerotherapy. Large varicose veins can often be treated with newer minimally invasive procedures and, in rare cases, surgery may be needed.     Please call [facility] to schedule an appointment. If you received a prescription please bring it with you to your appointment. You may call one of the locations below, although some locations are limited to what they carry.    Office Locations  New Locations  [facility]  [place]  Home Medical Equipment  [address], [place] [address]  [facility] ([facility])  [address], [place] [address]  Phone [phone] /Fax [phone]    [place]   [address]   Medical Equipment [address]/Orthotics and Prosthetics [address]  [place] [address]   Phone: [phone]  Fax [phone]    [facility]  [address]  [place] [address]  Phone: [phone]  Fax: [phone]    [facility] Bld[facility]   [address]  [place] [address]  Phone: [phone]  Fax: [phone]    [facility]  [address]  [place] [address]  Phone: [phone]  Fax: [phone]    [facility]  [address]  [address]  [place] [address]  Phone: [phone]  Fax: [phone]    [facility]  [address] [place] [address] [address]   [place] [address]   Phone: [phone]  Fax: [phone]    [place]   [address] [place] [place] [address]   Phone: [phone]  Fax: [phone]    [facility]   [address]             [address]                 [address]     [place] [address]      [place] [address]         [place] [address]  [phone]  [phone]     [phone]               Fax: [phone]  [email or web address]                                                [facility]  [address] [place] [address]  [phone]  [phone]  [email or web address]    Manuela Russell  [phone]  [email or web address]    [facility]   [phone]    [address]  [place], [place]  [id number] [phone]

## 2021-06-18 NOTE — PROGRESS NOTES
1. Right-sided low back pain without sciatica, unspecified chronicity        Plan: I told the patient that she can certainly get some ibuprofen and take 240 mg up to 3 times a day.  I also encouraged her to use Tylenol between doses if needed.  She will also use some heat, as she has a heating pad at home and she will try to use that as well.  It seems that she is is been really busy over the last couple of weeks, and now things are settling down for her, so hopefully she can just rest up a bit use some heat and ibuprofen and if she will get better.  If she does this for a while and is not getting any better she will let us know, or certainly if the symptomatology changes or worsens.    Subjective: 73-year-old female who is here today for lower back pain in the right side into the right upper buttock area.  She states that June is really horribly busy month for her, and she has been doing a lot of traveling and social functions and sitting on hard aluminum chairs, she has been going to graduation parties and celebrations and car events and she goes on with quite a list of things that she has been doing recently.  She states that over the weekend when she was at a graduation party and sitting on aluminum chair for more than an hour, she started having right buttock and lower back pain.  It is a gnawing aching kind of pain, and is not sciatic in nature.  She does not have any radiation or numbness or tingling down her legs at all.  She has been using Tylenol which helps minimally.  She is hoping that she is going to be settling down on her activities over the next week or so and can give it more time to rest.    Objective: Well-appearing female in no acute distress.  Vital signs as noted.  She has decreased range of motion to flexion and extension of the back as well as to lateral flexion more to the right than the left.  She has no sciatic notch tenderness.  Negative straight leg raise.  Good strength and reflexes  noted in all 4 extremities.

## 2021-06-19 NOTE — PROGRESS NOTES
ASSESSMENT:     Diagnoses and all orders for this visit:    Lumbar radiculitis  -     gabapentin (NEURONTIN) 100 MG capsule; Take 100 mg by mouth 3 (three) times a day. Increase dose as directed by chart.  May increase to 1 tabs 3 times daily  Dispense: 90 capsule; Refill: 1  -     Ambulatory referral to Physical Therapy    Myofascial pain  -     gabapentin (NEURONTIN) 100 MG capsule; Take 100 mg by mouth 3 (three) times a day. Increase dose as directed by chart.  May increase to 1 tabs 3 times daily  Dispense: 90 capsule; Refill: 1  -     Ambulatory referral to Physical Therapy    Age-related osteoporosis without current pathological fracture            Zahra Fitzgerald is a 73 y.o. female  with a BMI of Body mass index is 31.67 kg/(m^2). who presents today in consultation at the request of Kushal Szymanski MD  for new patient evaluation of chronic intermittent left lateral anterior thigh numbness.  Patient symptoms could be due to Meralgia paresthetica.  There is mild left neural foraminal stenosis at the L3-L4 that was seen at the lumbar MRI back in 2012 that could be contributing to her symptoms.  No red flags.    LESA:  0  WHO-5:  25    PLAN:  A shared decision making model was used.  The patient's values and choices were respected.  The following represents what was discussed and decided upon by the physician and the patient.      1.  DIAGNOSTIC TESTS: I discussed with the patient that we can order EMG testing for the left lower extremity to determine the etiology of her numbness in her left thigh.  Patient elected to proceed with conservative treatment,  physical therapy and medication first and then to consider EMG testing if not improved with PT and medication.  The lumbar MRI that was done back in 2012 showed mild left neuroforaminal stenosis at the L3-L4 that could be contributing to her symptoms.  2.  PHYSICAL THERAPY: I discussed the importance of core strengthening, ROM, stretching exercises  with the patient and how each of these entities is important in decreasing pain.  Explained to the patient that the purpose of physical therapy is to teach the patient a home exercise program.  These exercises need to be performed every day in order to decrease pain and prevent future occurrences of pain.  Likened it to brushing one's teeth.  I ordered [facility] physical therapy program if tolerated.  3.  MEDICATIONS: Patient will start on gabapentin 100 mg, 1 tablet 3 times a day, with gradual increase of 1 tablet every 3 days as per provided chart, to help with the intermittent numbness in the left thigh that could be neurogenic in origin.  Side effects of the medication discussed with the patient today.  4.  INTERVENTIONS: No therapeutic steroid injections at this time.   5.  PATIENT EDUCATION: I thoroughly discussed the plan with the patient today.  I Encouraged patient to call us back within 2 weeks to reports to us if her symptoms has improved with being on gabapentin 100 mg, and PT.  She verbalizes understanding and agrees with the plan.      FOLLOW-UP:   Patient will follow up with us in 6 weeks.  All questions were answered.                SUBJECTIVE:  Zahra Fitzgerald  Is a 73 y.o. female who presents today for new patient evaluation of intermittent numbness in the left lateral anterior thigh for the last 6 years.  Patient stated that she had lumbar MRI that was done back in 2012 that showed disc herniation and was told that might be the cause of her numbness.  Patient denies low back pain, left lower extremity pain.  Her most concern is the intermittent mild numbness that comes and goes.  At this time she reports no numbness in the left side.  Her numbness generally appears when she standing, walking and rates it at 3-4 out of 10 in intensity.  She is not taking any medication for the numbness.  Her lumbar MRI that was done back in 2012 showed mild left foraminal stenosis at the L3-L4 as well as mild right  neural foraminal stenosis at the L4-L5.  There is no neural foraminal stenosis at the left L4-L5.  Lumbar MRI also showed levoscoliosis of the lumbar spine that is centered at the L3.  Patient denies history of EMG testing to the left lower extremity.  Patient denies being on gabapentin in the past.  Patient underwent for physical therapy for the right shoulder in the past.  She has known osteoporosis and has follow-up with Dr. Motley for it.  Patient denies recent trauma to her lower back.  She recalls falling over a hedge many years ago, that did not require going to urgent care.  Patient denies urinary or bowel incontinence, unintentional weight loss, saddle anesthesia, fever or chills, balance difficulties.      Medications:    Current Outpatient Prescriptions   Medication Sig Dispense Refill     aspirin 81 mg chewable tablet Chew 81 mg daily.       atenolol (TENORMIN) 25 MG tablet TAKE ONE TABLET BY MOUTH ONE TIME DAILY  90 tablet 1     black cohosh 540 mg cap Take by mouth. Take 1 capsule daily       calcium carbonate-vitamin D3 (CALCIUM 600 + D,3,) 600 mg(1,500mg) -400 unit per tablet Take 1 tablet by mouth daily.       denosumab 60 mg/mL Syrg Inject 60 mg under the skin once.       FOLIC ACID/MULTIVITS-MIN/LUT (CENTRUM SILVER ORAL) Take by mouth. womens ultra       MV,CA,MIN/FA/HERBAL NO.158 (ESTROVEN ENERGY ORAL) Take by mouth.       OMEGA-3/DHA/EPA/FISH OIL (FISH OIL-OMEGA-3 FATTY ACIDS) 300-1,000 mg capsule Take 1 g by mouth daily.       simvastatin (ZOCOR) 10 MG tablet TAKE 1 TABLET BY MOUTH EVERY NIGHT AT BEDTIME 90 tablet 3     vitamin A-vitamin C-vit E-min (OCUVITE) Tab tablet Take 2 tablets by mouth daily.       gabapentin (NEURONTIN) 100 MG capsule Take 100 mg by mouth 3 (three) times a day. Increase dose as directed by chart.  May increase to 1 tabs 3 times daily 90 capsule 1     Current Facility-Administered Medications   Medication Dose Route Frequency Provider Last Rate Last Dose      denosumab 60 mg (PROLIA 60 mg/ml)  60 mg Subcutaneous Q6 Months Kushal Motley MD   60 mg at 07/18/18 1414       Allergies: No Known Allergies    PMH:  No past medical history on file.    PSH:    Past Surgical History:   Procedure Laterality Date     WA APPENDECTOMY      Description: Appendectomy;  Recorded: 03/12/2009;       Family History:    Family History   Problem Relation Age of Onset     Breast cancer Mother      Unsure of age     Breast cancer Maternal Aunt 65     Breast cancer Maternal Aunt 65     Breast cancer Sister 42       Social History:   Social History     Social History     Marital status:      Spouse name: N/A     Number of children: N/A     Years of education: N/A     Occupational History     Not on file.     Social History Main Topics     Smoking status: Never Smoker     Smokeless tobacco: Not on file     Alcohol use Not on file     Drug use: Not on file     Sexual activity: Not on file     Other Topics Concern     Not on file     Social History Narrative       ROS: Left intermittent lateral anterior thigh numbness. specifically negative for bowel/bladder dysfunction, fevers,chills, appetite changes, unexplained weight loss.   Otherwise 13 systems reviewed are negative.  Please see the patient's intake questionnaire from today for details.      OBJECTIVE:   PHYSICAL EXAMINATION:    CONSTITUTIONAL:  Vital signs as above.  No acute distress.  The patient is well nourished and well groomed.  PSYCHIATRIC:  The patient is awake, alert, oriented to person, place, time and answering questions appropriately with clear speech.    SKIN:  Skin over the face, bilateral lower extremities, and posterior torso is clean, dry, intact without rashes.    GAIT:   Gait is non-antalgic.   The patient is able to heel and toe walk without significant difficulty.    STANDING EXAMINATION: No tenderness at the L4-L5, L5-S1 bilaterally.  No tenderness at the SI joint bilaterally.  Mild tenderness at the L5-S1  midline.  MUSCLE STRENGTH:  The patient has 5/5  strength for the bilateral hip flexors, knee flexors/extensors, ankle dorsiflexors/plantar flexors, great toe extensors, ankle evertors/invertors.   NEUROLOGICAL:  2/4 patellar, medial hamstring, and achilles reflexes bilaterally.  Negative Babinski's bilaterally.  No ankle clonus bilaterally. Sensation to light touch is intact in the bilateral L4, L5, and S1 dermatomes.  VASCULAR:  2/4  pulses bilaterally.  Bilateral lower extremities are warm.  There is no pitting edema of the bilateral lower extremities.   ABDOMINAL:  Soft, non-distended, non-tender throughout all quadrants.  No pulsatile mass palpated in the left lower quadrant.  LYMPH NODES:  No palpable or tender inguinal/popliteal lymph nodes.  MUSCULOSKELETAL: Negative straight leg raise bilaterally.  Negative hip impingement Albin test bilaterally.          RESULTS:      Please refer to media section for full report of the lumbar MRI that was done back in 2012. ([facility]PF).

## 2021-06-19 NOTE — PROGRESS NOTES
"  1. Age-related osteoporosis without current pathological fracture  DXA Bone Density Scan   2. Left leg numbness  Ambulatory referral to [facility]   3. Lumbar radiculopathy  Ambulatory referral to [facility]       Return in about 6 months (around 1/18/2019) for Annual physical.    Patient Instructions   Prolia 9th today.  Prolia 10th in 6 months with your physical.    DXA in 6/2019 .   Phone number to schedule [phone].    Daily calcium need is 2875-9448 mg a day from the diet and supplements.  Calcium citrate is easier to digest.  Vitamin D 2000 IU daily recommended.    You should see the [facility].      Chief Complaint   Patient presents with     Osteoporosis Follow Up       /64  Pulse 84  Ht 5' 1.22\" (1.555 m)  Wt 167 lb 1.6 oz (75.8 kg)  BMI 31.35 kg/m2      Did you experience any problems with previous Prolia injection? no  Any medication change in the last 6 months? no  Did you take prednisone or other immunosupressant drugs in the last 6 months   (chemo, transplant, rheum, dermatology conditions)? no  Did you have any serious infection in the last 6 months?no  Any recent hospitalizations?no  Do you plan any dental work in the next 2-3 months?no  How much calcium do you take daily from the diet and supplements?1200 mg  How much vit D do you take daily? 2000 IU  Last DXA? 6/2017      Patient is here today for the 9th Prolia injection. Patient tolerated previous injections well.   We discussed calcium and vit D daily needs today.   Next Prolia injection will be in 6 months.     Patient is also complaining today about the left leg numbness.  She has this problem over the last 5 years with the last MRI of the lumbar spine done in 2012.  She does not have any pain down her left leg but does have on and off feeling of numbness just in the anterior lateral aspect of the left thigh.  The numbness never goes down to the lower leg or foot.  She denies any injuries.  She was told in the past that she " has a pinched nerve.  She did see spine clinic in 2012 and had a physical therapy which did not help.  On the exam today, her gait is normal, no restriction in the movements of the lumbar spine.  Straight leg test is negative, DTRs normal in the left leg.  Strength normal, no paresthesia.  She will see spine clinic for further evaluation of the lumbar radiculopathy.    25 minutes spent with the patient and more then 50 % of the time in counseling.  This note has been dictated using voice recognition software. Any grammatical or context distortions are unintentional and inherent to the software      Patient Active Problem List   Diagnosis     Diverticulosis     Hyperlipemia     Hypertension     Obesity     Osteoporosis       Current Outpatient Prescriptions on File Prior to Visit   Medication Sig Dispense Refill     aspirin 81 mg chewable tablet Chew 81 mg daily.       atenolol (TENORMIN) 25 MG tablet TAKE ONE TABLET BY MOUTH ONE TIME DAILY  90 tablet 1     black cohosh 540 mg cap Take by mouth. Take 1 capsule daily       calcium carbonate-vitamin D3 (CALCIUM 600 + D,3,) 600 mg(1,500mg) -400 unit per tablet Take 1 tablet by mouth daily.       denosumab 60 mg/mL Syrg Inject 60 mg under the skin once.       FOLIC ACID/MULTIVITS-MIN/LUT (CENTRUM SILVER ORAL) Take by mouth. womens ultra       MV,CA,MIN/FA/HERBAL NO.158 (ESTROVEN ENERGY ORAL) Take by mouth.       OMEGA-3/DHA/EPA/FISH OIL (FISH OIL-OMEGA-3 FATTY ACIDS) 300-1,000 mg capsule Take 1 g by mouth daily.       simvastatin (ZOCOR) 10 MG tablet TAKE 1 TABLET BY MOUTH EVERY NIGHT AT BEDTIME 90 tablet 3     vitamin A-vitamin C-vit E-min (OCUVITE) Tab tablet Take 2 tablets by mouth daily.       No current facility-administered medications on file prior to visit.

## 2021-06-26 NOTE — PROGRESS NOTES
"Prolia Injection Phone Screen      Screening questions have been asked 2-3 days prior to administration visit for Prolia. If any questions are answered with \"Yes,\" this phone encounter were will routed to ordering provider for further evaluation.     1.  When was the last injection?  2019    2.  Has insurance for this injection been verified?  Yes    3.  Did you experience any new onset achiness or rashes that lasted for over a month with your previous Prolia injection?   No    4.  Do you have a fever over 101?F or a new deep cough that is unusual for you today? No    5.  Have you started any new medications in the last 6 months that you were told could affect your immune system? These may have been prescribed by oncologist, transplant, rheumatology, or dermatology.   No    6.  In the last 6 months have you have gastric bypass or parathyroid surgery?   No    7.  Do you plan dental work requiring drilling into the bone such as implants/extractions or oral surgery in the next 2-3 months?   No    8. Do you have new insurance since the last injection?    9. Have you received the Covid-19 vaccine? Yes  If No - Proceed with Prolia injection  If Yes - Date of vaccination 3/12/2021. Will need to wait until 2 weeks after 2nd dose of Covid-19 vaccine before administering Prolia       Patient informed if symptoms discussed above present prior to their administration appointment, they are to notify clinic immediately.     Lisa M Bloch                                      "

## 2021-06-30 NOTE — PROGRESS NOTES
Progress Notes by Jac Smart MD at 2/2/2021 10:00 AM     Author: Jac Smart MD Service: -- Author Type: Physician    Filed: 2/2/2021  6:14 PM Encounter Date: 2/2/2021 Status: Signed    : Jac Smart MD (Physician)           [facility] Vein Consult      Assessment:     1. varicose veins, bilateral   2. spider veins, bilateral       Plan:     1. Treatment options of conservative therapy of stockings use, exercise, weight loss, elevating legs when possible.    2. Script for compression stockings 20-30 mm hg  3. Ultrasound to evaluate legs for incompetency of both deep and superficial system . Done today, no major insuffiencey issues  4. Surgical treatment, discussed today   5. Follow up: 3 months.   6. Call for any questions concerns or issues    Subjective:      Zahra Fitzgerald is a 75 y.o. female  who was referred by Kushal Motley MD  for evaluation of varicose veins. Symptoms include pain, aching, fatigue, burning, edema and dermatitis. Patient has history of leg selling, pain and vein issues that have progressed. Pain and symptoms have affected daily living and work activities needing medications. Here for evaluation today. no stocking or compression devic use    Allergies:Patient has no known allergies.    History reviewed. No pertinent past medical history.    Past Surgical History:   Procedure Laterality Date   ? IN APPENDECTOMY      Description: Appendectomy;  Recorded: 03/12/2009;       Current Outpatient Medications   Medication Sig   ? aspirin 81 mg chewable tablet Chew 81 mg daily.   ? atenoloL (TENORMIN) 25 MG tablet Take 1 tablet (25 mg total) by mouth ONCE daily.   ? black cohosh 540 mg cap Take by mouth. Take 1 capsule daily   ? calcium carbonate-vitamin D3 (CALCIUM 600 + D,3,) 600 mg(1,500mg) -400 unit per tablet Take 1 tablet by mouth daily.   ? denosumab 60 mg/mL Syrg Inject 60 mg under the skin once.   ? FOLIC ACID/MULTIVITS-MIN/LUT (CENTRUM SILVER ORAL)  Take by mouth. womens ultra   ? MV,CA,MIN/FA/HERBAL NO.158 (ESTROVEN ENERGY ORAL) Take by mouth.   ? OMEGA-3/DHA/EPA/FISH OIL (FISH OIL-OMEGA-3 FATTY ACIDS) 300-1,000 mg capsule Take 1 g by mouth daily.   ? simvastatin (ZOCOR) 10 MG tablet TAKE 1 TABLET BY MOUTH EVERY NIGHT AT BEDTIME   ? vitamin A-vitamin C-vit E-min (OCUVITE) Tab tablet Take 2 tablets by mouth daily.       Family History   Problem Relation Age of Onset   ? Breast cancer Mother         Unsure of age   ? Breast cancer Maternal Aunt 65   ? Breast cancer Maternal Aunt 65   ? Breast cancer Sister 42        reports that she has never smoked. She has never used smokeless tobacco.      Review of Systems:  Pertinent items are noted in HPI.  A 12 point comprehensive review of systems was negative except as noted.   Patient has symptomatic veins and changes of bilateral legs. These have progressed to the point of causing symptoms on a daily basis. This causes issues with daily activities and chores such as washing dishes, vacuuming, outdoor upkeep and standing for long lengths of time       Objective:     Vitals:    02/02/21 0958   BP: 132/70   Pulse: 76   Temp: 97.9  F (36.6  C)   TempSrc: Oral     There is no height or weight on file to calculate BMI.    EXAM:  GENERAL: This is a well-developed 75 y.o. female who appears her stated age  HEAD: normocephalic  HEENT: Pupils equal and reactive bilaterally  MOUTH: mucus membranes intact. Normal dentation  CARDIAC: RRR without murmur  CHEST/LUNG:  Clear to auscultation bilaterally  ABDOMEN: Soft, nontender, nondistended, no masses noted   NEUROLOGIC: Focally intact, nonfocal, alert and oriented x 3  INTEGUMENT: No open lesions or ulcers  VASCULAR: Pulses intact, symmetrical upper and lower extremities. There areskin changes consistent with chronic venous insufficiency. Varicose veins present in bilateral greater saphenous distribution. Spider veins present bilateral.                Imaging:    US Venous  Insufficiency Legs Bilateral (Order [id number])  Imaging  Date: 2/2/2021 Department: [facility] [place] Released By: Ruddy Paul, EVERARDO, RVT Authorizing: Jac Smart MD   Study Result    BILATERAL Venous Insufficiency Ultrasound (02/02/21)    BILATERAL Lower Extremity          Indication:  Bilateral leg pain/swelling      Previous: 01/08/2021     Patient History: Swelling, Stasis and Anticoagulants     Presenting Symptoms:  Swelling, Pain, Stasis and Cyanosis     Technique:   Supine and Upright Ultrasound of the Deep and Superficial Veins with Valsalva and Compression Augmentation Maneuvers. Duplex Imaging is performed utilizing gray-scale, Two-dimensional images, color-flow imaging, Doppler waveform analysis, and Spectral doppler imaging done with provacative maneuvers.      Incompetency Criteria:  Deep vein reflux reported when greater than 1000 msec flow reversal. Superficial vein reflux reported when equal to or greater than 600 msec flow reversal.  vein reflux reported as greater than 350 msec flow reversal.      Right  Leg Deep Veins    CFV SFJ PFV PROX SFV   PROX SFV MID SFV DIST POP V. PERON.   V. PTV'S   Compressibility  (FC,PC,NC) FC FC FC FC FC FC FC FC FC   Reflux -   - - - - -   -         Right Leg Saphenous Veins  Location SFJ PROX THIGH KNEE MID CALF SPJ PROX CALF MID CALF   RT GSV (mm) 6 4 3 2         Reflux - - - -         RT SSV (mm)         3 2 2   Reflux         - - -      Left Leg Deep Veins    CFV SFJ PFV PROX SFV PROX SFV MID SFV DIST POP V. PERON. V. PTV'S   Compressibility  (FC,PC,NC) FC FC FC FC FC FC FC FC FC   Reflux -   - - + - -   -         Lt Leg Saphenous Veins   Location SFJ PROX THIGH KNEE MID CALF SPJ PROX CALF MID CALF   LT GSV (mm) 6 3 3 3         Reflux - - - -         LT SSV (mm)         4 3 3   Reflux         - - +      Compression Study:  Normal     Impression:          Reference:     Compressibility: FC= Fully compressible,  PC= Partially compressible, NC= Non-compressible, NV= Not Visualized     Reflux: (+) Incompetent  (-) Competent, (NV) = Not Visualized     Interpretation criteria:          Duration of Retrograde flow (milliseconds)  Category Deep Veins Superficial Veins  veins   Competent < 1000ms < 600ms < 350ms   Incompetent > 1000ms > 600ms > 350ms            Jac Smart MD  General Surgery [phone]  Vascular Surgery [phone]

## 2021-07-03 NOTE — ADDENDUM NOTE
Addendum Note by Bloch, Lisa M, CMA at 7/13/2017 10:17 AM     Author: Bloch, Lisa M, CMA Service: -- Author Type: Certified Medical Assistant    Filed: 7/13/2017 10:17 AM Encounter Date: 7/13/2017 Status: Signed    : Bloch, Lisa M, CMA (Certified Medical Assistant)    Addended by: BLOCH, LISA M on: 7/13/2017 10:17 AM        Modules accepted: Orders

## 2021-07-08 ENCOUNTER — TRANSCRIBE ORDERS (OUTPATIENT)
Dept: INTERNAL MEDICINE | Facility: CLINIC | Age: 76
End: 2021-07-08

## 2021-07-08 DIAGNOSIS — Z92.29 PERSONAL HISTORY OF OTHER DRUG THERAPY: Primary | ICD-10-CM

## 2021-07-08 DIAGNOSIS — M81.0 AGE-RELATED OSTEOPOROSIS WITHOUT CURRENT PATHOLOGICAL FRACTURE: ICD-10-CM

## 2021-07-08 NOTE — PROGRESS NOTES
As part of the required manual data conversion process for integration, this encounter was created to document a CAM (Clinic Administered Medication) order. This information was copied from the [facility] patient's chart to the [facility] patient chart.     Christina Woodward, Víctor  July 8, 2021

## 2021-07-13 ENCOUNTER — OFFICE VISIT (OUTPATIENT)
Dept: INTERNAL MEDICINE | Facility: CLINIC | Age: 76
End: 2021-07-13
Payer: MEDICARE

## 2021-07-13 ENCOUNTER — RECORDS - HEALTHEAST (OUTPATIENT)
Dept: ADMINISTRATIVE | Facility: CLINIC | Age: 76
End: 2021-07-13

## 2021-07-13 VITALS
BODY MASS INDEX: 28.32 KG/M2 | WEIGHT: 150 LBS | DIASTOLIC BLOOD PRESSURE: 77 MMHG | HEIGHT: 61 IN | HEART RATE: 70 BPM | SYSTOLIC BLOOD PRESSURE: 127 MMHG

## 2021-07-13 DIAGNOSIS — Z00.00 ENCOUNTER FOR ANNUAL WELLNESS EXAM IN MEDICARE PATIENT: Primary | ICD-10-CM

## 2021-07-13 DIAGNOSIS — E78.5 HYPERLIPIDEMIA, UNSPECIFIED HYPERLIPIDEMIA TYPE: ICD-10-CM

## 2021-07-13 DIAGNOSIS — M81.0 AGE-RELATED OSTEOPOROSIS WITHOUT CURRENT PATHOLOGICAL FRACTURE: ICD-10-CM

## 2021-07-13 DIAGNOSIS — Z12.31 VISIT FOR SCREENING MAMMOGRAM: ICD-10-CM

## 2021-07-13 DIAGNOSIS — I10 ESSENTIAL HYPERTENSION: ICD-10-CM

## 2021-07-13 DIAGNOSIS — R63.4 WEIGHT LOSS: ICD-10-CM

## 2021-07-13 LAB
ALBUMIN SERPL-MCNC: 4 G/DL (ref 3.5–5)
ALP SERPL-CCNC: 93 U/L (ref 45–120)
ALT SERPL W P-5'-P-CCNC: 18 U/L (ref 0–45)
ANION GAP SERPL CALCULATED.3IONS-SCNC: 13 MMOL/L (ref 5–18)
AST SERPL W P-5'-P-CCNC: 27 U/L (ref 0–40)
BILIRUB SERPL-MCNC: 0.5 MG/DL (ref 0–1)
BUN SERPL-MCNC: 20 MG/DL (ref 8–28)
CALCIUM SERPL-MCNC: 9.9 MG/DL (ref 8.5–10.5)
CHLORIDE BLD-SCNC: 104 MMOL/L (ref 98–107)
CO2 SERPL-SCNC: 23 MMOL/L (ref 22–31)
CREAT SERPL-MCNC: 0.8 MG/DL (ref 0.6–1.1)
GFR SERPL CREATININE-BSD FRML MDRD: 72 ML/MIN/1.73M2
GLUCOSE BLD-MCNC: 102 MG/DL (ref 70–125)
LDLC SERPL CALC-MCNC: 91 MG/DL
POTASSIUM BLD-SCNC: 4.4 MMOL/L (ref 3.5–5)
PROT SERPL-MCNC: 7.5 G/DL (ref 6–8)
SODIUM SERPL-SCNC: 140 MMOL/L (ref 136–145)
TSH SERPL DL<=0.005 MIU/L-ACNC: 2.06 UIU/ML (ref 0.3–5)

## 2021-07-13 PROCEDURE — 36415 COLL VENOUS BLD VENIPUNCTURE: CPT | Performed by: INTERNAL MEDICINE

## 2021-07-13 PROCEDURE — 82306 VITAMIN D 25 HYDROXY: CPT | Performed by: INTERNAL MEDICINE

## 2021-07-13 PROCEDURE — G0438 PPPS, INITIAL VISIT: HCPCS | Performed by: INTERNAL MEDICINE

## 2021-07-13 PROCEDURE — 83721 ASSAY OF BLOOD LIPOPROTEIN: CPT | Performed by: INTERNAL MEDICINE

## 2021-07-13 PROCEDURE — 80053 COMPREHEN METABOLIC PANEL: CPT | Performed by: INTERNAL MEDICINE

## 2021-07-13 PROCEDURE — 84443 ASSAY THYROID STIM HORMONE: CPT | Performed by: INTERNAL MEDICINE

## 2021-07-13 ASSESSMENT — ACTIVITIES OF DAILY LIVING (ADL): CURRENT_FUNCTION: NO ASSISTANCE NEEDED

## 2021-07-13 ASSESSMENT — MIFFLIN-ST. JEOR: SCORE: 1107.78

## 2021-07-13 NOTE — PROGRESS NOTES
"SUBJECTIVE:   Zahra Fitzgerald is a 76 year old female who presents for Preventive Visit.    {Split Bill scripting  The purpose of this visit is to discuss your medical history and prevent health problems before you are sick. You may be responsible for a co-pay, coinsurance, or deductible if your visit today includes services such as checking on a sore throat, having an x-ray or lab test, or treating and evaluating a new or existing condition :  Patient has been advised of split billing requirements and indicates understanding: Yes   Are you in the first 12 months of your Medicare coverage?  No    Healthy Habits:     In general, how would you rate your overall health?  Good    Frequency of exercise:  2-3 days/week    Duration of exercise:  15-30 minutes    Do you usually eat at least 4 servings of fruit and vegetables a day, include whole grains    & fiber and avoid regularly eating high fat or \"junk\" foods?  Yes    Taking medications regularly:  Yes    Medication side effects:  None    Ability to successfully perform activities of daily living:  No assistance needed    Home Safety:  No safety concerns identified    Hearing Impairment:  No hearing concerns    In the past 6 months, have you been bothered by leaking of urine?  No    In general, how would you rate your overall mental or emotional health?  Excellent      PHQ-2 Total Score: 0    Additional concerns today:  No    Do you feel safe in your environment? Yes    Have you ever done Advance Care Planning? (For example, a Health Directive, POLST, or a discussion with a medical provider or your loved ones about your wishes): No, advance care planning information given to patient to review.  Patient declined advance care planning discussion at this time.     Fall risk  Fallen 2 or more times in the past year?: No  Any fall with injury in the past year?: No  click delete button to remove this line now  Cognitive Screening   1) Repeat 3 items (Leader, Season, " Table)    NORMAL  2) Clock draw:   NORMAL  3) 3 item recall:   Recalls 3 objects  Results: NORMAL clock, 1-2 items recalled: COGNITIVE IMPAIRMENT LESS LIKELY    Mini-CogTM Copyright DOLORES Bazzi. Licensed by the author for use in [facility]; reprinted with permission (deborah@[facility]). All rights reserved.      Do you have sleep apnea, excessive snoring or daytime drowsiness?: no    Reviewed and updated as needed this visit by clinical staff  Tobacco  Allergies  Meds  Problems  Med Hx  Surg Hx  Fam Hx          Reviewed and updated as needed this visit by Provider  Tobacco  Allergies  Meds  Problems  Med Hx  Surg Hx  Fam Hx         Social History     Tobacco Use     Smoking status: Never Smoker     Smokeless tobacco: Never Used   Substance Use Topics     Alcohol use: Not on file     If you drink alcohol do you typically have >3 drinks per day or >7 drinks per week? No    Alcohol Use 7/13/2021   Prescreen: >3 drinks/day or >7 drinks/week? No   Prescreen: >3 drinks/day or >7 drinks/week? -   No flowsheet data found.      Current providers sharing in care for this patient include:     Patient Care Team:  Kushal Motley MD as PCP - General (Internal Medicine)  Kushal Motley MD as Physician (Internal Medicine)    The following health maintenance items are reviewed in Epic and correct as of today:  Health Maintenance Due   Topic Date Due     ANNUAL REVIEW OF  ORDERS  Never done     HEPATITIS C SCREENING  Never done     ZOSTER IMMUNIZATION (2 of 3) 02/21/2008     COLORECTAL CANCER SCREENING  03/12/2014     FALL RISK ASSESSMENT  10/18/2020          Review of Systems  CONSTITUTIONAL: NEGATIVE for fever, chills, change in weight  INTEGUMENTARY/SKIN: NEGATIVE for worrisome rashes, moles or lesions  EYES: NEGATIVE for vision changes or irritation  ENT/MOUTH: NEGATIVE for ear, mouth and throat problems  RESP: NEGATIVE for significant cough or SOB  BREAST: NEGATIVE for masses, tenderness or  "discharge  CV: NEGATIVE for chest pain, palpitations or peripheral edema  GI: NEGATIVE for nausea, abdominal pain, heartburn, or change in bowel habits  : NEGATIVE for frequency, dysuria, or hematuria  MUSCULOSKELETAL: NEGATIVE for significant arthralgias or myalgia  NEURO: NEGATIVE for weakness, dizziness or paresthesias  ENDOCRINE: NEGATIVE for temperature intolerance, skin/hair changes  HEME: NEGATIVE for bleeding problems  PSYCHIATRIC: NEGATIVE for changes in mood or affect    OBJECTIVE:   /77   Pulse 70   Ht 1.549 m (5' 1\")   Wt 68 kg (150 lb)   BMI 28.34 kg/m   Estimated body mass index is 28.34 kg/m  as calculated from the following:    Height as of this encounter: 1.549 m (5' 1\").    Weight as of this encounter: 68 kg (150 lb).  Physical Exam  General Appearance: Alert, cooperative, no distress, appears stated age.  Head: Normocephalic, without obvious abnormality, atraumatic  Eyes: PERRL, conjunctiva/corneas clear, EOM's intact  Ears: Normal TM's and external ear canals, both ears  Nose: Nares normal, septum midline,mucosa normal, no drainage  Throat: Lips, mucosa, and tongue normal; teeth and gums normal  Neck: Supple, symmetrical, trachea midline, no adenopathy;  thyroid: not enlarged, symmetric, no tenderness/mass/nodules; no carotid bruit or JVD  Back: Symmetric, no curvature, ROM normal, no CVA tenderness.  Lungs: Clear to auscultation bilaterally, respirations unlabored.  Breasts: No breast masses, tenderness, asymmetry, or nipple discharge.  Heart: Regular rate and rhythm, S1 and S2 normal, no murmur, rub, or gallop.  Abdomen: Soft, non-tender, bowel sounds active all four quadrants,  no masses, no organomegaly.  Extremities: Extremities normal, atraumatic, no cyanosis or edema.  Skin: Skin color, texture, turgor normal, no rashes or lesions.  Lymph nodes: Cervical, supraclavicular, and axillary nodes normal.  Neurologic: No focal neurological findings.        ASSESSMENT / PLAN:   1. " "Encounter for annual wellness exam in Medicare patient      2. Age-related osteoporosis without current pathological fracture  She had Prolia on 6/17/21.  - Vitamin D Deficiency    3. Hypertension  Stable    4. Hyperlipidemia, unspecified hyperlipidemia type  On statin  - Comprehensive metabolic panel (BMP + Alb, Alk Phos, ALT, AST, Total. Bili, TP)  - LDL cholesterol direct    5. Visit for screening mammogram    - *MA Screening Digital Bilateral; Future    6. Weight loss  She noticed weight loss of 10 lbs in the last year. She did change her diet a lot and she declines night sweats, vomiting, blood in the stool.I will check thyroid today.  - TSH    Patient has been advised of split billing requirements and indicates understanding: Yes      Estimated body mass index is 28.34 kg/m  as calculated from the following:    Height as of this encounter: 1.549 m (5' 1\").    Weight as of this encounter: 68 kg (150 lb).        She reports that she has never smoked. She has never used smokeless tobacco.      Appropriate preventive services were discussed with this patient, including applicable screening as appropriate for cardiovascular disease, diabetes, osteopenia/osteoporosis, and glaucoma.  As appropriate for age/gender, discussed screening for colorectal cancer, prostate cancer, breast cancer, and cervical cancer. Checklist reviewing preventive services available has been given to the patient.    Reviewed patients plan of care and provided an AVS. The Basic Care Plan (routine screening as documented in Health Maintenance) for Zahra meets the Care Plan requirement. This Care Plan has been established and reviewed with the Patient.    Counseling Resources:  ATP IV Guidelines  Pooled Cohorts Equation Calculator  Breast Cancer Risk Calculator  Breast Cancer: Medication to Reduce Risk  FRAX Risk Assessment  ICSI Preventive Guidelines  Dietary Guidelines for Americans, 2010  USDA's MyPlate  ASA Prophylaxis  Lung CA " Screening    Kushal Motley MD  [facility]    Identified Health Risks:

## 2021-07-13 NOTE — LETTER
July 14, 2021      Zahra Fitzgerald  [address] [address]        Dear ,    We are writing to inform you of your test results.    Your test results fall within the expected range(s) or remain unchanged from previous results.  Please continue with current treatment plan.    Resulted Orders   Comprehensive metabolic panel (BMP + Alb, Alk Phos, ALT, AST, Total. Bili, TP)   Result Value Ref Range    Sodium 140 136 - 145 mmol/L    Potassium 4.4 3.5 - 5.0 mmol/L    Chloride 104 98 - 107 mmol/L    Carbon Dioxide (CO2) 23 22 - 31 mmol/L    Anion Gap 13 5 - 18 mmol/L    Urea Nitrogen 20 8 - 28 mg/dL    Creatinine 0.80 0.60 - 1.10 mg/dL    Calcium 9.9 8.5 - 10.5 mg/dL    Glucose 102 70 - 125 mg/dL    Alkaline Phosphatase 93 45 - 120 U/L    AST 27 0 - 40 U/L    ALT 18 0 - 45 U/L    Protein Total 7.5 6.0 - 8.0 g/dL    Albumin 4.0 3.5 - 5.0 g/dL    Bilirubin Total 0.5 0.0 - 1.0 mg/dL    GFR Estimate 72 >60 mL/min/1.73m2      Comment:      As of July 11, 2021, eGFR is calculated by the CKD-EPI creatinine equation, without race adjustment. eGFR can be influenced by muscle mass, exercise, and diet. The reported eGFR is an estimation only and is only applicable if the renal function is stable.   Vitamin D Deficiency   Result Value Ref Range    Vitamin D, Total (25-Hydroxy) 33 30 - 80 ug/L    Narrative    Deficiency <10.0 ug/L  Insufficiency 10.0-29.9 ug/L  Sufficiency 30.0-80.0 ug/L  Toxicity (possible) >100.0 ug/L    LDL cholesterol direct   Result Value Ref Range    LDL Cholesterol Direct 91 <=129 mg/dL   TSH   Result Value Ref Range    TSH 2.06 0.30 - 5.00 uIU/mL       If you have any questions or concerns, please call the clinic at the number listed above.       Sincerely,      Kushal Motley MD

## 2021-07-14 LAB — DEPRECATED CALCIDIOL+CALCIFEROL SERPL-MC: 33 UG/L (ref 30–80)

## 2021-07-21 ENCOUNTER — RECORDS - HEALTHEAST (OUTPATIENT)
Dept: ADMINISTRATIVE | Facility: CLINIC | Age: 76
End: 2021-07-21

## 2021-07-23 ENCOUNTER — RECORDS - HEALTHEAST (OUTPATIENT)
Dept: FAMILY MEDICINE | Facility: CLINIC | Age: 76
End: 2021-07-23

## 2021-07-23 DIAGNOSIS — N63.0 LUMP OR MASS IN BREAST: ICD-10-CM

## 2021-12-20 ENCOUNTER — ALLIED HEALTH/NURSE VISIT (OUTPATIENT)
Dept: FAMILY MEDICINE | Facility: CLINIC | Age: 76
End: 2021-12-20
Payer: MEDICARE

## 2021-12-20 DIAGNOSIS — M81.0 OSTEOPOROSIS: Primary | ICD-10-CM

## 2021-12-20 PROCEDURE — 96372 THER/PROPH/DIAG INJ SC/IM: CPT | Performed by: PHARMACIST

## 2021-12-20 PROCEDURE — 99207 PR NO CHARGE NURSE ONLY: CPT

## 2021-12-20 NOTE — PROGRESS NOTES
"The following steps were completed to comply with the REMS program for Prolia:  1. Ordering provider has previously reviewed information in the Medication Guide and Patient Counseling Chart, including the serious risks of Prolia  and the symptoms of each risk and have been advised to seek prompt medical attention if they have signs or symptoms of any of the serious risks.  2. Provided each patient a copy of the Medication Guide and Patient Brochure.  See MAR for administration details.   Indication: Prolia  (denosumab) is a prescription medicine used to treat osteoporosis in patients who:   Are at high risk for fracture, meaning patients who have had a fracture related to osteoporosis, or who have multiple risk factors for fracture; Cannot use another osteoporosis medicine or other osteoporosis medicines did not work well.   The timeline for early/late injections would be 4 weeks early and any time after the 6 month samantha. If a patient receives their injection late, then the subsequent injection would be 6 months from the date that they actually received the injection    Have the screening questions been asked prior to this administration? No    Prolia Injection Phone Screen      Screening questions have been asked 2-3 days prior to administration visit for Prolia. If any questions are answered with \"Yes,\" this phone encounter were will routed to ordering provider for further evaluation.     1.  When was the last injection?  6/17/21    2.  Has insurance for this injection been verified?  Yes    3.  Did you experience any new onset achiness or rashes that lasted for over a month with your previous Prolia injection?   No    4.  Do you have a fever over 101?F or a new deep cough that is unusual for you today? No    5.  Have you started any new medications in the last 6 months that you were told could affect your immune system? These may have been prescribed by oncologist, transplant, rheumatology, or dermatology. "   No    6.  In the last 6 months have you have gastric bypass or parathyroid surgery?   No    7.  Do you plan dental work requiring drilling into the bone such as implants/extractions or oral surgery in the next 2-3 months?   No    8. Do you have new insurance since the last injection? No     9. Have you received the Covid-19 vaccine? Yes  If No - Proceed with Prolia injection  If Yes - Date of vaccination 3/12/21. Will need to wait until 2 weeks after 2nd dose of Covid-19 vaccine before administering Prolia       Patient informed if symptoms discussed above present prior to their administration appointment, they are to notify clinic immediately.     Linda Leroy

## 2022-02-26 DIAGNOSIS — I10 ESSENTIAL HYPERTENSION: ICD-10-CM

## 2022-02-28 RX ORDER — ATENOLOL 25 MG/1
TABLET ORAL
Qty: 90 TABLET | Refills: 1 | Status: SHIPPED | OUTPATIENT
Start: 2022-02-28 | End: 2022-06-28

## 2022-02-28 NOTE — TELEPHONE ENCOUNTER
"Last Written Prescription Date:  3/15/21  Last Fill Quantity: 90,  # refills: 3   Last office visit provider:  7/13/21     Requested Prescriptions   Pending Prescriptions Disp Refills     atenolol (TENORMIN) 25 MG tablet [Pharmacy Med Name: Atenolol Oral Tablet 25 MG] 90 tablet 0     Sig: TAKE ONE TABLET BY MOUTH ONE TIME DAILY       Beta-Blockers Protocol Passed - 2/28/2022  7:08 AM        Passed - Blood pressure under 140/90 in past 12 months     BP Readings from Last 3 Encounters:   07/13/21 127/77   02/02/21 132/70   01/13/21 136/64                 Passed - Patient is age 6 or older        Passed - Recent (12 mo) or future (30 days) visit within the authorizing provider's specialty     Patient has had an office visit with the authorizing provider or a provider within the authorizing providers department within the previous 12 mos or has a future within next 30 days. See \"Patient Info\" tab in inbasket, or \"Choose Columns\" in Meds & Orders section of the refill encounter.              Passed - Medication is active on med list             Alexandre Garcia RN 02/28/22 7:09 AM  "

## 2022-03-06 DIAGNOSIS — E78.5 HYPERLIPEMIA: ICD-10-CM

## 2022-03-07 RX ORDER — SIMVASTATIN 10 MG
10 TABLET ORAL AT BEDTIME
Qty: 90 TABLET | Refills: 1 | Status: SHIPPED | OUTPATIENT
Start: 2022-03-07 | End: 2022-10-03

## 2022-03-07 NOTE — TELEPHONE ENCOUNTER
"Last Written Prescription Date:  3/19/21  Last Fill Quantity: 90,  # refills: 3   Last office visit provider:  7/13/21     Requested Prescriptions   Pending Prescriptions Disp Refills     simvastatin (ZOCOR) 10 MG tablet [Pharmacy Med Name: Simvastatin Oral Tablet 10 MG] 90 tablet 0     Sig: TAKE ONE TABLET BY MOUTH AT BEDTIME       Statins Protocol Passed - 3/7/2022  3:25 PM        Passed - LDL on file in past 12 months     Recent Labs   Lab Test 07/13/21  1126   LDL 91             Passed - No abnormal creatine kinase in past 12 months     No lab results found.             Passed - Recent (12 mo) or future (30 days) visit within the authorizing provider's specialty     Patient has had an office visit with the authorizing provider or a provider within the authorizing providers department within the previous 12 mos or has a future within next 30 days. See \"Patient Info\" tab in inbasket, or \"Choose Columns\" in Meds & Orders section of the refill encounter.              Passed - Medication is active on med list        Passed - Patient is age 18 or older        Passed - No active pregnancy on record        Passed - No positive pregnancy test in past 12 months             Alexandre Garcia RN 03/07/22 3:25 PM  "

## 2022-06-02 DIAGNOSIS — Z92.29 PERSONAL HISTORY OF OTHER DRUG THERAPY: ICD-10-CM

## 2022-06-02 DIAGNOSIS — M81.0 AGE-RELATED OSTEOPOROSIS WITHOUT CURRENT PATHOLOGICAL FRACTURE: Primary | ICD-10-CM

## 2022-06-22 ENCOUNTER — OFFICE VISIT (OUTPATIENT)
Dept: INTERNAL MEDICINE | Facility: CLINIC | Age: 77
End: 2022-06-22
Payer: MEDICARE

## 2022-06-22 VITALS
SYSTOLIC BLOOD PRESSURE: 112 MMHG | DIASTOLIC BLOOD PRESSURE: 60 MMHG | HEIGHT: 61 IN | HEART RATE: 70 BPM | WEIGHT: 147 LBS | OXYGEN SATURATION: 100 % | BODY MASS INDEX: 27.75 KG/M2

## 2022-06-22 DIAGNOSIS — I10 ESSENTIAL HYPERTENSION: ICD-10-CM

## 2022-06-22 DIAGNOSIS — M81.0 AGE-RELATED OSTEOPOROSIS WITHOUT CURRENT PATHOLOGICAL FRACTURE: Primary | ICD-10-CM

## 2022-06-22 LAB
CALCIUM, IONIZED MEASURED: 1.21 MMOL/L (ref 1.11–1.3)
DEPRECATED CALCIDIOL+CALCIFEROL SERPL-MC: 45 UG/L (ref 20–75)
ION CA PH 7.4: 1.23 MMOL/L (ref 1.11–1.3)
PH: 7.42 (ref 7.35–7.45)

## 2022-06-22 PROCEDURE — 82330 ASSAY OF CALCIUM: CPT | Performed by: INTERNAL MEDICINE

## 2022-06-22 PROCEDURE — 82306 VITAMIN D 25 HYDROXY: CPT | Performed by: INTERNAL MEDICINE

## 2022-06-22 PROCEDURE — 99214 OFFICE O/P EST MOD 30 MIN: CPT | Mod: 25 | Performed by: INTERNAL MEDICINE

## 2022-06-22 PROCEDURE — 36415 COLL VENOUS BLD VENIPUNCTURE: CPT | Performed by: INTERNAL MEDICINE

## 2022-06-22 PROCEDURE — 96372 THER/PROPH/DIAG INJ SC/IM: CPT | Performed by: INTERNAL MEDICINE

## 2022-06-22 RX ORDER — CHOLECALCIFEROL (VITAMIN D3) 50 MCG
1 TABLET ORAL DAILY
Status: ON HOLD | COMMUNITY
End: 2023-10-20

## 2022-06-22 NOTE — PROGRESS NOTES
"  (M81.0) Age-related osteoporosis without current pathological fracture  (primary encounter diagnosis)  Comment: stable on Prolia.   Plan: Ionized Calcium, Vitamin D Deficiency            (I10) Hypertension  Comment: stable on atenolol.    Patient was educated on safety of Prolia utilizing Patient Counseling Chart for Healthcare Providers, as outlined by the Prolia REMS progam.     Return in about 6 months (around 12/22/2022) for AWV, Prolia.    Patient Instructions   Prolia 14th today.  Prolia 15th in 6 months with me and your AWV.    DXA in 6/2023.   Phone number to schedule [phone].    Daily calcium need is 7703-9243 mg a day from the diet and supplements.  Calcium citrate is easier to digest.  Vitamin D 2000 IU daily recommended.           /60 (BP Location: Right arm, Patient Position: Sitting)   Pulse 70   Ht 1.549 m (5' 1\")   Wt 66.7 kg (147 lb)   SpO2 100%   BMI 27.78 kg/m        Did you experience any problems with previous Prolia injection? no  Any medication change in the last 6 months? no  Did you take prednisone or other immunosupressant drugs in the last 6 months   (chemo, transplant, rheum, dermatology conditions)? no  Did you have any serious infection in the last 6 months?no  Any recent hospitalizations?no  Do you plan any dental work in the next 2-3 months?no  How much calcium do you take daily from the diet and supplements?1200 mg  How much vit D do you take daily? 2000 IU  Last DXA? 6/2021, Reviewed and discussed      Patient is here today for the 14th Prolia injection. Patient tolerated previous injections well.   We discussed calcium and vit D daily needs today.   I reviewed the lab results:  Component      Latest Ref Rng & Units 7/13/2021   Sodium      136 - 145 mmol/L 140   Potassium      3.5 - 5.0 mmol/L 4.4   Chloride      98 - 107 mmol/L 104   Carbon Dioxide      22 - 31 mmol/L 23   Anion Gap      5 - 18 mmol/L 13   Urea Nitrogen      8 - 28 mg/dL 20   Creatinine      0.60 " - 1.10 mg/dL 0.80   Calcium      8.5 - 10.5 mg/dL 9.9   Glucose      70 - 125 mg/dL 102   Alkaline Phosphatase      45 - 120 U/L 93   AST      0 - 40 U/L 27   ALT      0 - 45 U/L 18   Protein Total      6.0 - 8.0 g/dL 7.5   Albumin      3.5 - 5.0 g/dL 4.0   Bilirubin Total      0.0 - 1.0 mg/dL 0.5   GFR Estimate      >60 mL/min/1.73m2 72   Vitamin D Deficiency screening      30 - 80 ug/L 33         We discussed high risk of rebound vertebral fractures when Prolia suddenly stopped.    Next Prolia injection will be in 6 months.           This note has been dictated using voice recognition software. Any grammatical or context distortions are unintentional and inherent to the software      Patient Active Problem List   Diagnosis     Diverticulosis     Hyperlipemia     Hypertension     Osteoporosis       Current Outpatient Medications   Medication     aspirin 81 mg chewable tablet     atenolol (TENORMIN) 25 MG tablet     black cohosh 540 mg cap     calcium carbonate 600 mg-vitamin D 400 units (CALTRATE) 600-400 MG-UNIT per tablet     denosumab 60 mg/mL Syrg     FOLIC ACID/MULTIVITS-MIN/LUT (CENTRUM SILVER ORAL)     MV,CA,MIN/FA/HERBAL NO.158 (ESTROVEN ENERGY ORAL)     simvastatin (ZOCOR) 10 MG tablet     vitamin A-vitamin C-vit E-min (OCUVITE) Tab tablet     vitamin D3 (CHOLECALCIFEROL) 50 mcg (2000 units) tablet     Current Facility-Administered Medications   Medication     denosumab (PROLIA) injection 60 mg

## 2022-06-22 NOTE — PATIENT INSTRUCTIONS
Prolia 14th today.  Prolia 15th in 6 months with me and your AWV.    DXA in 6/2023.   Phone number to schedule [phone].    Daily calcium need is 8802-3391 mg a day from the diet and supplements.  Calcium citrate is easier to digest.  Vitamin D 2000 IU daily recommended.

## 2022-06-27 DIAGNOSIS — I10 ESSENTIAL HYPERTENSION: ICD-10-CM

## 2022-06-28 RX ORDER — ATENOLOL 25 MG/1
TABLET ORAL
Qty: 90 TABLET | Refills: 3 | Status: SHIPPED | OUTPATIENT
Start: 2022-06-28 | End: 2023-08-25

## 2022-06-28 NOTE — TELEPHONE ENCOUNTER
"Routing refill request to provider for review/approval because:  Early refill requested.    Last Written Prescription Date:  2/28/22  Last Fill Quantity: 90,  # refills: 1   Last office visit provider:  6/22/22     Requested Prescriptions   Pending Prescriptions Disp Refills     atenolol (TENORMIN) 25 MG tablet [Pharmacy Med Name: Atenolol Oral Tablet 25 MG] 90 tablet 0     Sig: TAKE ONE TABLET BY MOUTH ONE TIME DAILY       Beta-Blockers Protocol Passed - 6/28/2022  3:23 PM        Passed - Blood pressure under 140/90 in past 12 months     BP Readings from Last 3 Encounters:   06/22/22 112/60   07/13/21 127/77   02/02/21 132/70                 Passed - Patient is age 6 or older        Passed - Recent (12 mo) or future (30 days) visit within the authorizing provider's specialty     Patient has had an office visit with the authorizing provider or a provider within the authorizing providers department within the previous 12 mos or has a future within next 30 days. See \"Patient Info\" tab in inbasket, or \"Choose Columns\" in Meds & Orders section of the refill encounter.              Passed - Medication is active on med list             Alexandre Garcia RN 06/28/22 3:23 PM  "

## 2022-09-22 ENCOUNTER — TRANSFERRED RECORDS (OUTPATIENT)
Dept: HEALTH INFORMATION MANAGEMENT | Facility: CLINIC | Age: 77
End: 2022-09-22

## 2022-09-26 ENCOUNTER — TELEPHONE (OUTPATIENT)
Dept: INTERNAL MEDICINE | Facility: CLINIC | Age: 77
End: 2022-09-26

## 2022-09-26 NOTE — TELEPHONE ENCOUNTER
Symptoms    Describe your symptoms: burning feeling in right hip    Any pain: Yes:     How long have you been having symptoms: 2 weeks     Have you been seen for this:  Yes: seen ortho wants to know who else the patient can see     Appointment offered?: Yes:     Triage offered?: No    Home remedies tried: none    Preferred Pharmacy:   [id number] - [place], [place] - [address] [address]  Phone: [phone] Fax: [phone]      Okay to leave a detailed message?: Yes at Home number on file [phone] (home)

## 2022-09-26 NOTE — TELEPHONE ENCOUNTER
Outgoing Call:  Please see notes below    Pt  reporting pt experiencing Right hip burning sensation,  Pt stating fell doesn't remember when  Pt states has not taken anything for it  Pt stating is getting better feeling a lot better today but would like guidance for seeing someone else.    Message will be routed to Dr. Motley for advise/recommendations.    Arleth YEPEZ RN  [facility]

## 2022-09-27 NOTE — TELEPHONE ENCOUNTER
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  Please advise what she should do.

## 2023-01-06 ENCOUNTER — APPOINTMENT (OUTPATIENT)
Dept: ULTRASOUND IMAGING | Facility: CLINIC | Age: 78
End: 2023-01-06
Attending: EMERGENCY MEDICINE
Payer: MEDICARE

## 2023-01-06 ENCOUNTER — HOSPITAL ENCOUNTER (EMERGENCY)
Facility: CLINIC | Age: 78
Discharge: HOME | End: 2023-01-06
Attending: EMERGENCY MEDICINE | Admitting: EMERGENCY MEDICINE
Payer: MEDICARE

## 2023-01-06 ENCOUNTER — APPOINTMENT (OUTPATIENT)
Dept: CT IMAGING | Facility: CLINIC | Age: 78
End: 2023-01-06
Attending: EMERGENCY MEDICINE
Payer: MEDICARE

## 2023-01-06 VITALS
RESPIRATION RATE: 18 BRPM | BODY MASS INDEX: 28.71 KG/M2 | DIASTOLIC BLOOD PRESSURE: 63 MMHG | OXYGEN SATURATION: 98 % | SYSTOLIC BLOOD PRESSURE: 118 MMHG | TEMPERATURE: 97.7 F | HEART RATE: 75 BPM | HEIGHT: 60 IN

## 2023-01-06 DIAGNOSIS — K59.00 CONSTIPATION, UNSPECIFIED CONSTIPATION TYPE: ICD-10-CM

## 2023-01-06 LAB
ALBUMIN SERPL-MCNC: 3.5 G/DL (ref 3.5–5)
ALBUMIN UR-MCNC: NEGATIVE MG/DL
ALP SERPL-CCNC: 59 U/L (ref 45–120)
ALT SERPL W P-5'-P-CCNC: 20 U/L (ref 0–45)
ANION GAP SERPL CALCULATED.3IONS-SCNC: 10 MMOL/L (ref 5–18)
APPEARANCE UR: CLEAR
AST SERPL W P-5'-P-CCNC: 24 U/L (ref 0–40)
BACTERIA #/AREA URNS HPF: ABNORMAL /HPF
BASOPHILS # BLD AUTO: 0 10E3/UL (ref 0–0.2)
BASOPHILS NFR BLD AUTO: 0 %
BILIRUB DIRECT SERPL-MCNC: 0.2 MG/DL
BILIRUB SERPL-MCNC: 0.6 MG/DL (ref 0–1)
BILIRUB UR QL STRIP: NEGATIVE
BUN SERPL-MCNC: 26 MG/DL (ref 8–28)
CALCIUM SERPL-MCNC: 9.3 MG/DL (ref 8.5–10.5)
CHLORIDE BLD-SCNC: 108 MMOL/L (ref 98–107)
CO2 SERPL-SCNC: 25 MMOL/L (ref 22–31)
COLOR UR AUTO: ABNORMAL
CREAT SERPL-MCNC: 0.91 MG/DL (ref 0.6–1.1)
EOSINOPHIL # BLD AUTO: 0.1 10E3/UL (ref 0–0.7)
EOSINOPHIL NFR BLD AUTO: 1 %
ERYTHROCYTE [DISTWIDTH] IN BLOOD BY AUTOMATED COUNT: 13.6 % (ref 10–15)
GFR SERPL CREATININE-BSD FRML MDRD: 65 ML/MIN/1.73M2
GLUCOSE BLD-MCNC: 95 MG/DL (ref 70–125)
GLUCOSE UR STRIP-MCNC: NEGATIVE MG/DL
HCT VFR BLD AUTO: 37.3 % (ref 35–47)
HGB BLD-MCNC: 12.4 G/DL (ref 11.7–15.7)
HGB UR QL STRIP: NEGATIVE
HOLD SPECIMEN: NORMAL
IMM GRANULOCYTES # BLD: 0 10E3/UL
IMM GRANULOCYTES NFR BLD: 0 %
KETONES UR STRIP-MCNC: NEGATIVE MG/DL
LEUKOCYTE ESTERASE UR QL STRIP: ABNORMAL
LIPASE SERPL-CCNC: 40 U/L (ref 0–52)
LYMPHOCYTES # BLD AUTO: 1.4 10E3/UL (ref 0.8–5.3)
LYMPHOCYTES NFR BLD AUTO: 19 %
MCH RBC QN AUTO: 31.6 PG (ref 26.5–33)
MCHC RBC AUTO-ENTMCNC: 33.2 G/DL (ref 31.5–36.5)
MCV RBC AUTO: 95 FL (ref 78–100)
MONOCYTES # BLD AUTO: 0.7 10E3/UL (ref 0–1.3)
MONOCYTES NFR BLD AUTO: 10 %
MUCOUS THREADS #/AREA URNS LPF: PRESENT /LPF
NEUTROPHILS # BLD AUTO: 5.1 10E3/UL (ref 1.6–8.3)
NEUTROPHILS NFR BLD AUTO: 70 %
NITRATE UR QL: NEGATIVE
NRBC # BLD AUTO: 0 10E3/UL
NRBC BLD AUTO-RTO: 0 /100
PH UR STRIP: 6.5 [PH] (ref 5–7)
PLATELET # BLD AUTO: 207 10E3/UL (ref 150–450)
POTASSIUM BLD-SCNC: 4.1 MMOL/L (ref 3.5–5)
PROT SERPL-MCNC: 6.5 G/DL (ref 6–8)
RBC # BLD AUTO: 3.92 10E6/UL (ref 3.8–5.2)
RBC URINE: 3 /HPF
SODIUM SERPL-SCNC: 143 MMOL/L (ref 136–145)
SP GR UR STRIP: 1.02 (ref 1–1.03)
SQUAMOUS EPITHELIAL: 4 /HPF
UROBILINOGEN UR STRIP-MCNC: <2 MG/DL
WBC # BLD AUTO: 7.4 10E3/UL (ref 4–11)
WBC URINE: 4 /HPF

## 2023-01-06 PROCEDURE — 36415 COLL VENOUS BLD VENIPUNCTURE: CPT | Performed by: EMERGENCY MEDICINE

## 2023-01-06 PROCEDURE — 83690 ASSAY OF LIPASE: CPT | Performed by: EMERGENCY MEDICINE

## 2023-01-06 PROCEDURE — 85025 COMPLETE CBC W/AUTO DIFF WBC: CPT | Performed by: EMERGENCY MEDICINE

## 2023-01-06 PROCEDURE — 250N000011 HC RX IP 250 OP 636: Performed by: EMERGENCY MEDICINE

## 2023-01-06 PROCEDURE — 99285 EMERGENCY DEPT VISIT HI MDM: CPT | Mod: 25

## 2023-01-06 PROCEDURE — 74177 CT ABD & PELVIS W/CONTRAST: CPT | Mod: MG

## 2023-01-06 PROCEDURE — 87086 URINE CULTURE/COLONY COUNT: CPT | Performed by: EMERGENCY MEDICINE

## 2023-01-06 PROCEDURE — 80053 COMPREHEN METABOLIC PANEL: CPT | Performed by: EMERGENCY MEDICINE

## 2023-01-06 PROCEDURE — 76705 ECHO EXAM OF ABDOMEN: CPT

## 2023-01-06 PROCEDURE — 82248 BILIRUBIN DIRECT: CPT | Performed by: EMERGENCY MEDICINE

## 2023-01-06 PROCEDURE — 81001 URINALYSIS AUTO W/SCOPE: CPT | Performed by: EMERGENCY MEDICINE

## 2023-01-06 RX ORDER — IOPAMIDOL 755 MG/ML
100 INJECTION, SOLUTION INTRAVASCULAR ONCE
Status: COMPLETED | OUTPATIENT
Start: 2023-01-06 | End: 2023-01-06

## 2023-01-06 RX ORDER — DOCUSATE SODIUM 100 MG/1
100 CAPSULE, LIQUID FILLED ORAL 2 TIMES DAILY
Qty: 30 CAPSULE | Refills: 0 | Status: ON HOLD | OUTPATIENT
Start: 2023-01-06 | End: 2023-07-23

## 2023-01-06 RX ORDER — SODIUM PHOSPHATE, DIBASIC AND SODIUM PHOSPHATE, MONOBASIC 3.5; 9.5 G/66ML; G/66ML
1 ENEMA RECTAL ONCE
Qty: 1 ENEMA | Refills: 0 | Status: SHIPPED | OUTPATIENT
Start: 2023-01-06 | End: 2023-01-06

## 2023-01-06 RX ORDER — POLYETHYLENE GLYCOL 3350 17 G/17G
1 POWDER, FOR SOLUTION ORAL DAILY
Qty: 116 G | Refills: 0 | Status: ON HOLD | OUTPATIENT
Start: 2023-01-06 | End: 2023-07-23

## 2023-01-06 RX ADMIN — IOPAMIDOL 100 ML: 755 INJECTION, SOLUTION INTRAVENOUS at 10:50

## 2023-01-06 ASSESSMENT — ACTIVITIES OF DAILY LIVING (ADL)
ADLS_ACUITY_SCORE: 35
ADLS_ACUITY_SCORE: 35

## 2023-01-06 NOTE — ED TRIAGE NOTES
Pt brought in by  with right sided abdominal pain. Pt had a lot of pian when abdomen touched in RLQ, also has hand over right upper quadrant. Pt answering questions but  states she has some dementia.  did not want her to stand on scale as he states she has been unsteady lately and fell two days ago.

## 2023-01-06 NOTE — ED PROVIDER NOTES
EMERGENCY DEPARTMENT ENCOUNTER      NAME: Zahra Fitzgerald  AGE: 77 year old female  YOB: 1945  MRN: [id number]  EVALUATION DATE & TIME: 2023  8:12 AM    PCP: Kushal Motley    ED PROVIDER: Blas Reed D.O.      Chief Complaint   Patient presents with     Abdominal Pain       FINAL IMPRESSION:  1. Constipation, unspecified constipation type        ED COURSE & MEDICAL DECISION MAKIN:21 AM I met with the patient to gather history and to perform my initial exam. I discussed the plan for care while in the Emergency Department.  10:02 AM Rechecked and updated patient with plans for a CT scan.  11:59 AM Rechecked and updated the patient. We discussed the plan for discharge and the patient is agreeable. Reviewed supportive cares, symptomatic treatment, outpatient follow up, and reasons to return to the Emergency Department. Patient to be discharged by ED RN.          Pertinent Labs & Imaging studies reviewed. (See chart for details)  77 year old female presents to the Emergency Department for evaluation of right-sided abdominal pain.  Patient's  did report that he felt a mass in that area yesterday.  She did have right upper quadrant pain on my exam.  Lab testing has been largely unremarkable in this patient.  Concern was initially for gallbladder pathology such as choledocholithiasis or cholecystitis, versus bowel obstruction, versus mesenteric ischemia, versus other emergent pathology.  CT imaging shows constipation without evidence of acute other pathology, and ultrasound was unremarkable.  With lab testing and imaging, I am suspicious that it is actually constipation that is causing her symptoms especially as it appears worse on the right than on the left.  Therefore at this time plan will be for discharge with stool softeners, MiraLAX, and an enema to see if this would improve her symptoms.  I did give the patient very strict return precautions both she and her  agreed to.   She will otherwise follow-up with her primary care provider.    Medical Decision Making    History:    Supplemental history from: Family Member/Significant Other    External Record(s) reviewed: Documented in HPI, if applicable.    Work Up:    Chart documentation includes differential considered and any EKGs or imaging independently interpreted by provider.    In additional to work up documented, I considered the following work up: See chart documentation, if applicable.    External consultation:    Discussion of management with another provider: See chart documentation, if applicable    Complicating factors:    Care impacted by chronic illness: N/A    Care affected by social determinants of health: N/A    Disposition considerations: Discharge. I prescribed additional prescription strength medication(s) as charted. I considered admission, but discharged the patient after share decision making conversation.        At the conclusion of the encounter I discussed the results of all of the tests and the disposition. The questions were answered. The patient or family acknowledged understanding and was agreeable with the care plan.      [facility] the [place]    Patient information was obtained from: patient    Use of : N/A        Zahra Fitzgerald is a 77 year old female who presents abdominal pain.    Patient reports RUQ abdominal pain for the past few days. She describes the pain as sharp with palpation. She denies nausea, vomiting, diarrhea, fever, shortness of breath, chest pain, and light headedness. There were no other concerns/complaints at this time.    She has pertinent medical history of appendectomy, hypertension, and hyperlipidemia, diverticulosis. She denies alcohol use and allergies to medications.       REVIEW OF SYSTEMS  Constitutional:  Denies fever, chills, weight loss or weakness  Eyes:  No pain, discharge, redness  HENT:  Denies sore throat, ear pain, congestion  Respiratory: No SOB, wheeze or  cough  Cardiovascular:  No CP, palpitations  GI: Endorses RUQ abdominal pain. Denies nausea, vomiting, diarrhea  : Denies dysuria, hematuria  Musculoskeletal:  Denies any new muscle/joint pain, swelling or loss of function.  Skin:  Denies rash, pallor  Neurologic:  Denies headache, focal weakness or sensory changes, light headedness  Lymph: Denies swollen nodes    All other systems negative unless noted in HPI.    PAST MEDICAL HISTORY:  History reviewed. No pertinent past medical history.    PAST SURGICAL HISTORY:  Past Surgical History:   Procedure Laterality Date     [facility] APPENDECTOMY      Description: Appendectomy;  Recorded: 03/12/2009;         CURRENT MEDICATIONS:    Current Facility-Administered Medications   Medication     denosumab (PROLIA) injection 60 mg     Current Outpatient Medications   Medication     atenolol (TENORMIN) 25 MG tablet     docusate sodium (COLACE) 100 MG capsule     polyethylene glycol (MIRALAX) 17 GM/Dose powder     sodium phosphate (FLEET PEDS) 3.5-9.5 GM/59ML enema     aspirin 81 mg chewable tablet     black cohosh 540 mg cap     calcium carbonate 600 mg-vitamin D 400 units (CALTRATE) 600-400 MG-UNIT per tablet     denosumab 60 mg/mL Syrg     FOLIC ACID/MULTIVITS-MIN/LUT (CENTRUM SILVER ORAL)     MV,CA,MIN/FA/HERBAL NO.158 (ESTROVEN ENERGY ORAL)     simvastatin (ZOCOR) 10 MG tablet     vitamin A-vitamin C-vit E-min (OCUVITE) Tab tablet     vitamin D3 (CHOLECALCIFEROL) 50 mcg (2000 units) tablet         ALLERGIES:  No Known Allergies    FAMILY HISTORY:  Family History   Problem Relation Age of Onset     Breast Cancer Mother         Unsure of age     Breast Cancer Maternal Aunt 65.00     Breast Cancer Maternal Aunt 65.00     Breast Cancer Sister 42.00       SOCIAL HISTORY:  Social History     Socioeconomic History     Marital status:    Tobacco Use     Smoking status: Never     Smokeless tobacco: Never       VITALS:  Patient Vitals for the past 24 hrs:   BP Temp Pulse Resp SpO2  Height   01/06/23 0810 -- -- -- -- -- 1.524 m (5')   01/06/23 0808 118/63 97.7  F (36.5  C) 75 18 98 % --       PHYSICAL EXAM    VITAL SIGNS: /63   Pulse 75   Temp 97.7  F (36.5  C)   Resp 18   Ht 1.524 m (5')   SpO2 98%   BMI 28.71 kg/m      General Appearance: Well-appearing, well-nourished, no acute distress.  Head:  Normocephalic, without obvious abnormality, atraumatic  Eyes:  PERRL, conjunctiva/corneas clear, EOM's intact,  ENT:  Lips, mucosa, and tongue normal, membranes are moist without pallor  Neck:  Normal ROM, symmetrical, trachea midline    Cardio:  Regular rate and rhythm, no murmur, rub or gallop, 2+ pulses symmetric in all extremities  Pulm:  Clear to auscultation bilaterally, respirations unlabored,  Abdomen:  RUQ with guarding. Soft, no rebound.  Musculoskeletal: Full ROM, no edema, no cyanosis, good ROM of major joints  Integument:  Warm, Dry, No erythema, No rash.    Neurologic:  Alert & oriented.  No focal deficits appreciated.  Ambulatory.  Psychiatric:  Affect normal, Judgment normal, Mood normal.      LABS  Results for orders placed or performed during the hospital encounter of 01/06/23 (from the past 24 hour(s))   CBC with platelets + differential    Narrative    The following orders were created for panel order CBC with platelets + differential.  Procedure                               Abnormality         Status                     ---------                               -----------         ------                     CBC with platelets and d...[[id number]]                      Final result                 Please view results for these tests on the individual orders.   Basic metabolic panel   Result Value Ref Range    Sodium 143 136 - 145 mmol/L    Potassium 4.1 3.5 - 5.0 mmol/L    Chloride 108 (H) 98 - 107 mmol/L    Carbon Dioxide (CO2) 25 22 - 31 mmol/L    Anion Gap 10 5 - 18 mmol/L    Urea Nitrogen 26 8 - 28 mg/dL    Creatinine 0.91 0.60 - 1.10 mg/dL    Calcium 9.3 8.5 - 10.5  mg/dL    Glucose 95 70 - 125 mg/dL    GFR Estimate 65 >60 mL/min/1.73m2   Hepatic function panel   Result Value Ref Range    Bilirubin Total 0.6 0.0 - 1.0 mg/dL    Bilirubin Direct 0.2 <=0.5 mg/dL    Protein Total 6.5 6.0 - 8.0 g/dL    Albumin 3.5 3.5 - 5.0 g/dL    Alkaline Phosphatase 59 45 - 120 U/L    AST 24 0 - 40 U/L    ALT 20 0 - 45 U/L   Lipase   Result Value Ref Range    Lipase 40 0 - 52 U/L   [place] Draw    Narrative    The following orders were created for panel order [place] Draw.  Procedure                               Abnormality         Status                     ---------                               -----------         ------                     Extra Red Top Tube[[id number]]                               Final result               Extra Green Top (Lithium...[[id number]]                                                 Extra Purple Top Tube[[id number]]                                                         Please view results for these tests on the individual orders.   CBC with platelets and differential   Result Value Ref Range    WBC Count 7.4 4.0 - 11.0 10e3/uL    RBC Count 3.92 3.80 - 5.20 10e6/uL    Hemoglobin 12.4 11.7 - 15.7 g/dL    Hematocrit 37.3 35.0 - 47.0 %    MCV 95 78 - 100 fL    MCH 31.6 26.5 - 33.0 pg    MCHC 33.2 31.5 - 36.5 g/dL    RDW 13.6 10.0 - 15.0 %    Platelet Count 207 150 - 450 10e3/uL    % Neutrophils 70 %    % Lymphocytes 19 %    % Monocytes 10 %    % Eosinophils 1 %    % Basophils 0 %    % Immature Granulocytes 0 %    NRBCs per 100 WBC 0 <1 /100    Absolute Neutrophils 5.1 1.6 - 8.3 10e3/uL    Absolute Lymphocytes 1.4 0.8 - 5.3 10e3/uL    Absolute Monocytes 0.7 0.0 - 1.3 10e3/uL    Absolute Eosinophils 0.1 0.0 - 0.7 10e3/uL    Absolute Basophils 0.0 0.0 - 0.2 10e3/uL    Absolute Immature Granulocytes 0.0 <=0.4 10e3/uL    Absolute NRBCs 0.0 10e3/uL   Extra Red Top Tube   Result Value Ref Range    Hold Specimen JIC    Abdomen US, limited (RUQ only)    Narrative    EXAM: US  ABDOMEN LIMITED  LOCATION: [facility]  DATE/TIME: 1/6/2023 9:32 AM    INDICATION: Right upper quadrant pain  COMPARISON: CT abdomen and pelvis from 8/9/2007.  TECHNIQUE: Limited abdominal ultrasound.    FINDINGS:    GALLBLADDER: Normal. No gallstones, wall thickening, or pericholecystic fluid. Negative sonographic Saini's sign.    BILE DUCTS: No biliary dilatation. The common duct measures 6 mm.    LIVER: Normal parenchyma with smooth contour. No focal mass.    RIGHT KIDNEY: No hydronephrosis.    PANCREAS: The visualized portions are normal.    No ascites.      Impression    IMPRESSION:  1.  Normal limited abdominal ultrasound.       UA with Microscopic reflex to Culture    Specimen: Urine, Clean Catch   Result Value Ref Range    Color Urine Light Yellow Colorless, Straw, Light Yellow, Yellow    Appearance Urine Clear Clear    Glucose Urine Negative Negative mg/dL    Bilirubin Urine Negative Negative    Ketones Urine Negative Negative mg/dL    Specific Gravity Urine 1.023 1.001 - 1.030    Blood Urine Negative Negative    pH Urine 6.5 5.0 - 7.0    Protein Albumin Urine Negative Negative mg/dL    Urobilinogen Urine <2.0 <2.0 mg/dL    Nitrite Urine Negative Negative    Leukocyte Esterase Urine 500 Emerita/uL (A) Negative    Bacteria Urine Few (A) None Seen /HPF    Mucus Urine Present (A) None Seen /LPF    RBC Urine 3 (H) <=2 /HPF    WBC Urine 4 <=5 /HPF    Squamous Epithelials Urine 4 (H) <=1 /HPF    Narrative    Urine Culture ordered based on laboratory criteria   CT Abdomen Pelvis w Contrast    Narrative    EXAM: CT ABDOMEN PELVIS W CONTRAST  LOCATION: [facility]  DATE/TIME: 1/6/2023 11:00 AM    INDICATION: Right sided abdominal pain  COMPARISON: Abdominal ultrasound from earlier today  TECHNIQUE: CT scan of the abdomen and pelvis was performed following injection of IV contrast. Multiplanar reformats were obtained. Dose reduction techniques were used.  CONTRAST:  Isovue 370 100mL     FINDINGS:   LOWER CHEST: Patchy bibasilar atelectasis/scarring. Mild bronchiectasis. Benign calcified granuloma in the left lower lobe. Small hiatal hernia. Coronary atherosclerosis.    HEPATOBILIARY: Normal.    PANCREAS: Normal.    SPLEEN: Normal.    ADRENAL GLANDS: Normal.    KIDNEYS/BLADDER: No significant mass, stone, or hydronephrosis.    BOWEL: Diverticulosis of the colon. No acute inflammatory change. No obstruction. Moderate colonic stool burden. Appendectomy.    LYMPH NODES: Normal.    VASCULATURE: Unremarkable.    PELVIC ORGANS: Tiny enhancing lesion in the lower uterine segment or cervix series 4 image 64 measuring 6 mm.  This is not significantly changed since 2007 and likely benign. Represent a small fibroid. No follow-up required.    MUSCULOSKELETAL: Degenerative change of the spine and bilateral hips. Osteopenia.      Impression    IMPRESSION:   1.  No acute findings.  2.  Moderate colonic stool burden.  3.  Small hiatal hernia.           RADIOLOGY  CT Abdomen Pelvis w Contrast   Final Result   IMPRESSION:    1.  No acute findings.   2.  Moderate colonic stool burden.   3.  Small hiatal hernia.         Abdomen US, limited (RUQ only)   Final Result   IMPRESSION:   1.  Normal limited abdominal ultrasound.                   MEDICATIONS GIVEN IN THE EMERGENCY:  Medications   iopamidol (ISOVUE-370) solution 100 mL (100 mLs Intravenous Given 1/6/23 1050)       NEW PRESCRIPTIONS STARTED AT TODAY'S ER VISIT  New Prescriptions    DOCUSATE SODIUM (COLACE) 100 MG CAPSULE    Take 1 capsule (100 mg) by mouth 2 times daily    POLYETHYLENE GLYCOL (MIRALAX) 17 GM/DOSE POWDER    Take 17 g (1 capful) by mouth daily    SODIUM PHOSPHATE (FLEET PEDS) 3.5-9.5 GM/59ML ENEMA    Place 1 enema rectally once for 1 dose        Cari GOODWIN, am serving as a scribe to document services personally performed by Blas Reed D.O., based on my observations and the provider's statements to me.  Blas GOODWIN D.O.,  attest that Cari Urias is acting in a scribe capacity, has observed my performance of the services and has documented them in accordance with my direction.     Blas Reed D.O.  Emergency Medicine  [facility] EMERGENCY ROOM  [address] [id number] [phone]  Dept: [phone]     Blas Reed,   01/06/23 1223

## 2023-01-07 LAB — BACTERIA UR CULT: NORMAL

## 2023-01-08 ENCOUNTER — APPOINTMENT (OUTPATIENT)
Dept: RADIOLOGY | Facility: CLINIC | Age: 78
End: 2023-01-08
Payer: MEDICARE

## 2023-01-08 ENCOUNTER — NURSE TRIAGE (OUTPATIENT)
Dept: NURSING | Facility: CLINIC | Age: 78
End: 2023-01-08

## 2023-01-08 ENCOUNTER — HOSPITAL ENCOUNTER (EMERGENCY)
Facility: CLINIC | Age: 78
Discharge: HOME | End: 2023-01-08
Attending: EMERGENCY MEDICINE | Admitting: EMERGENCY MEDICINE
Payer: MEDICARE

## 2023-01-08 ENCOUNTER — APPOINTMENT (OUTPATIENT)
Dept: CT IMAGING | Facility: CLINIC | Age: 78
End: 2023-01-08
Payer: MEDICARE

## 2023-01-08 VITALS
SYSTOLIC BLOOD PRESSURE: 140 MMHG | TEMPERATURE: 98.1 F | HEART RATE: 70 BPM | OXYGEN SATURATION: 98 % | RESPIRATION RATE: 18 BRPM | DIASTOLIC BLOOD PRESSURE: 70 MMHG

## 2023-01-08 DIAGNOSIS — K59.00 CONSTIPATION: ICD-10-CM

## 2023-01-08 DIAGNOSIS — R10.84 ABDOMINAL PAIN, GENERALIZED: ICD-10-CM

## 2023-01-08 LAB
ALBUMIN SERPL-MCNC: 3.3 G/DL (ref 3.5–5)
ALP SERPL-CCNC: 57 U/L (ref 45–120)
ALT SERPL W P-5'-P-CCNC: 19 U/L (ref 0–45)
ANION GAP SERPL CALCULATED.3IONS-SCNC: 12 MMOL/L (ref 5–18)
AST SERPL W P-5'-P-CCNC: 28 U/L (ref 0–40)
BILIRUB DIRECT SERPL-MCNC: 0.3 MG/DL
BILIRUB SERPL-MCNC: 0.7 MG/DL (ref 0–1)
BUN SERPL-MCNC: 18 MG/DL (ref 8–28)
CALCIUM SERPL-MCNC: 9 MG/DL (ref 8.5–10.5)
CHLORIDE BLD-SCNC: 106 MMOL/L (ref 98–107)
CO2 SERPL-SCNC: 24 MMOL/L (ref 22–31)
CREAT SERPL-MCNC: 0.79 MG/DL (ref 0.6–1.1)
ERYTHROCYTE [DISTWIDTH] IN BLOOD BY AUTOMATED COUNT: 13.5 % (ref 10–15)
GFR SERPL CREATININE-BSD FRML MDRD: 77 ML/MIN/1.73M2
GLUCOSE BLD-MCNC: 98 MG/DL (ref 70–125)
HCT VFR BLD AUTO: 37.9 % (ref 35–47)
HGB BLD-MCNC: 12.5 G/DL (ref 11.7–15.7)
LIPASE SERPL-CCNC: 32 U/L (ref 0–52)
MCH RBC QN AUTO: 31.6 PG (ref 26.5–33)
MCHC RBC AUTO-ENTMCNC: 33 G/DL (ref 31.5–36.5)
MCV RBC AUTO: 96 FL (ref 78–100)
PLATELET # BLD AUTO: 217 10E3/UL (ref 150–450)
POTASSIUM BLD-SCNC: 4.5 MMOL/L (ref 3.5–5)
PROT SERPL-MCNC: 6.6 G/DL (ref 6–8)
RBC # BLD AUTO: 3.96 10E6/UL (ref 3.8–5.2)
SODIUM SERPL-SCNC: 142 MMOL/L (ref 136–145)
WBC # BLD AUTO: 5.4 10E3/UL (ref 4–11)

## 2023-01-08 PROCEDURE — 85048 AUTOMATED LEUKOCYTE COUNT: CPT

## 2023-01-08 PROCEDURE — 73522 X-RAY EXAM HIPS BI 3-4 VIEWS: CPT

## 2023-01-08 PROCEDURE — 83690 ASSAY OF LIPASE: CPT

## 2023-01-08 PROCEDURE — 82248 BILIRUBIN DIRECT: CPT

## 2023-01-08 PROCEDURE — 250N000013 HC RX MED GY IP 250 OP 250 PS 637

## 2023-01-08 PROCEDURE — 80053 COMPREHEN METABOLIC PANEL: CPT

## 2023-01-08 PROCEDURE — 74177 CT ABD & PELVIS W/CONTRAST: CPT | Mod: MG

## 2023-01-08 PROCEDURE — 250N000011 HC RX IP 250 OP 636

## 2023-01-08 PROCEDURE — 36415 COLL VENOUS BLD VENIPUNCTURE: CPT

## 2023-01-08 PROCEDURE — 71101 X-RAY EXAM UNILAT RIBS/CHEST: CPT | Mod: RT

## 2023-01-08 PROCEDURE — 99285 EMERGENCY DEPT VISIT HI MDM: CPT | Mod: 25

## 2023-01-08 RX ORDER — IOPAMIDOL 755 MG/ML
100 INJECTION, SOLUTION INTRAVASCULAR ONCE
Status: COMPLETED | OUTPATIENT
Start: 2023-01-08 | End: 2023-01-08

## 2023-01-08 RX ORDER — DOCUSATE SODIUM 100 MG/1
100 CAPSULE, LIQUID FILLED ORAL 2 TIMES DAILY
Qty: 20 CAPSULE | Refills: 0 | Status: SHIPPED | OUTPATIENT
Start: 2023-01-08 | End: 2023-01-18

## 2023-01-08 RX ADMIN — MINERAL OIL 226 ML: 1000 LIQUID ORAL at 16:34

## 2023-01-08 RX ADMIN — IOPAMIDOL 100 ML: 755 INJECTION, SOLUTION INTRAVENOUS at 15:01

## 2023-01-08 ASSESSMENT — ENCOUNTER SYMPTOMS
RECTAL PAIN: 0
WEAKNESS: 0
DYSURIA: 0
DIARRHEA: 0
NAUSEA: 0
CHILLS: 0
APPETITE CHANGE: 0
LIGHT-HEADEDNESS: 0
ABDOMINAL PAIN: 1
DIZZINESS: 0
COLOR CHANGE: 1
FEVER: 0
HEMATURIA: 0
SHORTNESS OF BREATH: 0
CONSTIPATION: 1
ANAL BLEEDING: 0
FLANK PAIN: 0
FREQUENCY: 0
HEADACHES: 0

## 2023-01-08 ASSESSMENT — ACTIVITIES OF DAILY LIVING (ADL)
ADLS_ACUITY_SCORE: 33
ADLS_ACUITY_SCORE: 35
ADLS_ACUITY_SCORE: 36

## 2023-01-08 NOTE — TELEPHONE ENCOUNTER
"In Friday morning and the found a, she can't go to the bathroom, stool. He said there is a large blockage that was causing the problem. He sent him to get pills. Used Enema and softners and she hasn't done anything. Enema was done on Friday and it came out after five minutes. It didn't do anything for the blockage. Hasn't had a bowel movement since. The MD never really gave instructions on what to feed her. She still eats good but probably the wrong food. She has a lump in that area and did an ultrasound, where the pain is, gall bladder. Today there is a bump on the bottom of her right side rib. He didn't address that. Ultrasound and MRI both done. Supposed to call her regular MD tomorrow. He will try and get her to the ER now. Pain at 8 with any movement, on right near ribs.  Justine Nascimento RN  [facility]      Reason for Disposition    [1] Abdomen pain is main symptom AND [2] adult female    Pain is mainly in upper abdomen  (if needed ask: \"is it mainly above the belly button?\")    [1] SEVERE pain (e.g., excruciating) AND [2] present > 1 hour     Pain on right near ribs, at 8 with movement.    Additional Information    Negative: [1] Abdomen pain is main symptom AND [2] male    Negative: Shock suspected (e.g., cold/pale/clammy skin, too weak to stand, low BP, rapid pulse)    Negative: Difficult to awaken or acting confused (e.g., disoriented, slurred speech)    Negative: Passed out (i.e., lost consciousness, collapsed and was not responding)    Negative: Sounds like a life-threatening emergency to the triager    Negative: Chest pain    Negative: SEVERE difficulty breathing (e.g., struggling for each breath, speaks in single words)    Negative: Shock suspected (e.g., cold/pale/clammy skin, too weak to stand, low BP, rapid pulse)    Negative: Difficult to awaken or acting confused (e.g., disoriented, slurred speech)    Negative: Passed out (i.e., lost consciousness, collapsed and was not responding)    " Negative: Visible sweat on face or sweat dripping down face    Negative: Sounds like a life-threatening emergency to the triager    Negative: Followed an abdomen (stomach) injury    Negative: Chest pain    Protocols used: CONSTIPATION-A-AH, ABDOMINAL PAIN - FEMALE-A-AH, ABDOMINAL PAIN - UPPER-A-AH

## 2023-01-08 NOTE — ED PROVIDER NOTES
EMERGENCY DEPARTMENT ENCOUNTER      NAME: Zahra Fitzgerald  AGE: 77 year old female  YOB: 1945  MRN: [id number]  EVALUATION DATE & TIME: 1/8/2023 12:55 PM    PCP: Kushal Motley    ED PROVIDER: Denisse Fletcher PA-C    Chief Complaint   Patient presents with     Constipation     FINAL IMPRESSION:  1. Constipation    2. Abdominal pain, generalized      MEDICAL DECISION MAKING:    Pertinent Labs & Imaging studies reviewed. (See chart for details)  Zahra Fitzgerald is a 77 year old female who presents for evaluation of constipation and abdominal pain.  Recently seen 1/6/23, sent home with stool softeners and enema.  Now here with ongoing constipation and abdominal pain.  Has tried stool softeners and enema without relief. Also having right upper quadrant/rib pain. Had an US done 1/6/23, which was negative. Recent fall 1/4/23, no head injury or LOC.    On my initial evaluation, vital signs normal, afebrile, not tachycardic or hypoxic. On physical exam patient is awake, alert, no acute distress, resting comfortably in bed.  Frail-appearing.  Not uncomfortable, ill or toxic appearing..  Heart sounds are normal, lungs are clear without wheezing or crackles.  She is tender on her right upper quadrant of her abdomen as well as her right rib cage, remainder of abdominal exam is benign.  No distention, rebound, guarding or masses.  She has normal bowel sounds to the left upper and left lower quadrants of her abdomen, hypoactive bowel sounds to her right lower quadrant.  No CVA tenderness bilaterally.  She has a small area of ecchymosis to her right rib cage/ right flank, no bleeding or open wounds.  Tender on palpation of her left hip, but no surrounding skin erythema, rashes or warmth. Full ROM of left hip.     Differential diagnosis includes constipation, diverticulitis, bowel obstruction, appendicitis, hepatitis, pancreatitis, rib fracture, rib dislocation, muscle strain, rib contusion, pelvic  fracture, hip fracture, hip dislocation. Emergency department workup included x-ray of right hip, basic labs in addition to LFTs and lipase, CT abdomen pelvis. Patient declined wanting medication for pain.     Patient does not remember hitting the right side of her flank during the fall, however she is having rib pain worsened with palpation, we will obtain x-ray imaging to further evaluate for possible rib fracture or dislocation. XR negative for fracture or dislocation. US done 2 days ago negative for acute gallbladder or liver pathology. Suspect this could be possibly referred pain from constipation, muscle strain or contusion from possible fall. Workup otherwise reassuring, so no further imaging felt to be necessary at this point.      Concern for worsening constipation versus possible new obstruction, did obtain CT imaging, which showed moderate stool burden, but no acute obstruction, perforation or other intraabdominal pathology to suggest cause for pain. DEANDRE done and negative for rectal impaction, unable to feel stool in rectal vault. Plan for enema.     Patient with large bowel movement after enema, reports feeling significantly improved.  No ongoing abdominal pain.  Sent home with Colace and encouraged patient and  for patient to continue using MiraLAX daily as well as increase fiber in her diet and increase her water intake to help prevent further constipation.  Otherwise reassuring exam today, low suspicion for any emergent process that would require further ER intervention or hospital admission.  Provided expectant management as well as strict return precautions for any worsening symptoms.    Patient has had serial examinations and notes significant improvement.     Patient was discharged in stable condition with treatment plan as below. Instructed to follow up with primary care provider in 3 days and return to the emergency department with any new or worsening of symptoms. Patient expressed  understanding, feels comfortable, and is in agreement with this plan. All questions addressed prior to discharge.    Medical Decision Making    Supplemental history from: Family Member/Significant Other    External Record(s) Reviewed: Outpatient Record: prior ER visit    Differential Diagnosis: See MDM charting for differential considered.     I performed an independent interpretation of the: N/A    Discussed with radiology regarding test interpretation: N/A    Discussion of management with another provider: See chart documentation, if applicable    The following testing was considered but ultimately not selected: None     I considered prescription management with: Symptomatic Management    The patient's care impacted: N/A    Consideration of Admission/Observation: N/A - Patient discharged without consideration for admission    Care significantly affected by Social Determinants of Health including: N/A    ED COURSE:  1:03 PM  I reviewed the patient's chart. I met with the patient to gather history and to perform my initial exam.    I wore appropriate PPE during this encounter including: facemask & eye protection   3:46 PM  I staffed this patient case with Dr. Saleem who agrees with plan at this time and will see the patient for their history, exam, and complete evaluation.  3:56 PM  I rechecked the patient and updated her and  on results.  4:12 PM performed rectal exam with nurse chaperone present, unable to feel any stool on my exam. Will try enema.   5:40 PM per RN, had medium sized bowel movement.   6:00 PM  rechecked patient after enema, she is feeling significantly improved and ready to go home.  We discussed plan for discharge including treatment plan, follow-up and return precautions to emergency department.  Patient voiced understanding and in agreement with this plan.    At the conclusion of the encounter I discussed the results of all of the tests and the disposition. The questions were answered.  The patient or family acknowledged understanding and was agreeable with the care plan.     MEDICATIONS GIVEN IN THE EMERGENCY:  Medications   iopamidol (ISOVUE-370) solution 100 mL (100 mLs Intravenous Given 1/8/23 1501)   Enema Compound (docusate/mineral oil/NaPhos) NO MAG CIT PREMIX (226 mLs Rectal Given 1/8/23 1634)     NEW PRESCRIPTIONS STARTED AT TODAY'S ER VISIT  New Prescriptions    DOCUSATE SODIUM (COLACE) 100 MG CAPSULE    Take 1 capsule (100 mg) by mouth 2 times daily for 10 days     =================================================================    HPI:    Patient information was obtained from: patient and     Use of Interpretor: N/A       Zahra BILL Fitzgerald is a 77 year old female with a pertinent history of diverticulosis, HTN, HLD who presents to this ED for evaluation of constipation and abdominal pain.     Per Chart Review, patient seen in the ER 1/6/23 for constipation and abdominal pain.  Had a CT scan done, which showed constipation with moderate colonic stool burden, however no other acute findings.  Had an ultrasound of her right upper quadrant, which was normal.  Was sent home with enema and stool softeners and strict return precautions. Patient called nurse triage line today noting that she has not had a bowel movement and has ongoing abdominal pain.    Patient reports since recent ER visit, she has continued to have ongoing constipation and abdominal pain.  They were given an enema and stool softeners to go home with and has been reports he placed the enema for about 6 minutes and patient had episode of diarrhea after this.  Does not think it helped the constipation or cleaned out bowel.  Since then, she has not had a bowel movement.   Has also been taking stool softeners without relief. Has ongoing mid abdominal pain.  Pain is worsened by movement and laughing, no pain at rest.  No fevers, chills, nausea, vomiting, urinary symptoms, back pain.  Has not taken anything at home for  the pain.  Continues to have right upper quadrant and right sided pain.  Has a small area of bruising to her right side.  She had a fall last Wednesday, but is unsure if she hit the right side or not.  Did not hit her head or lose consciousness during the fall.  Also having new left hip pain that started yesterday.  Did not fall or injure her left hip pain that would prompt the pain. Has been eating and drinking normally. Patient lives at home with  and son comes to help out.     REVIEW OF SYSTEMS:  Review of Systems   Constitutional: Negative for appetite change, chills and fever.   Respiratory: Negative for shortness of breath.    Cardiovascular: Negative for chest pain.   Gastrointestinal: Positive for abdominal pain and constipation. Negative for anal bleeding, diarrhea, nausea and rectal pain.   Genitourinary: Negative for dysuria, flank pain, frequency and hematuria.   Musculoskeletal:        Positive for right flank/side pain  Positive for left hip pain   Skin: Positive for color change (positive for bruise to right flank).   Neurological: Negative for dizziness, weakness, light-headedness and headaches.   All other systems reviewed and are negative.     PAST MEDICAL HISTORY:  No past medical history on file.    PAST SURGICAL HISTORY:  Past Surgical History:   Procedure Laterality Date     [facility] APPENDECTOMY      Description: Appendectomy;  Recorded: 03/12/2009;     CURRENT MEDICATIONS:      Current Facility-Administered Medications:      denosumab (PROLIA) injection 60 mg, 60 mg, Subcutaneous, Q6 Months, Kushal Motley MD, 60 mg at 06/22/22 0824    Current Outpatient Medications:      atenolol (TENORMIN) 25 MG tablet, TAKE ONE TABLET BY MOUTH ONE TIME DAILY, Disp: 90 tablet, Rfl: 3     docusate sodium (COLACE) 100 MG capsule, Take 1 capsule (100 mg) by mouth 2 times daily for 10 days, Disp: 20 capsule, Rfl: 0     aspirin 81 mg chewable tablet, [ASPIRIN 81 MG CHEWABLE TABLET] Chew 81 mg daily., Disp: ,  Rfl:      black cohosh 540 mg cap, [BLACK COHOSH 540 MG CAP] Take by mouth. Take 1 capsule daily, Disp: , Rfl:      calcium carbonate 600 mg-vitamin D 400 units (CALTRATE) 600-400 MG-UNIT per tablet, Take 1 tablet by mouth daily, Disp: , Rfl:      denosumab 60 mg/mL Syrg, [DENOSUMAB 60 MG/ML SYRG] Inject 60 mg under the skin once., Disp: , Rfl:      docusate sodium (COLACE) 100 MG capsule, Take 1 capsule (100 mg) by mouth 2 times daily, Disp: 30 capsule, Rfl: 0     FOLIC ACID/MULTIVITS-MIN/LUT (CENTRUM SILVER ORAL), [FOLIC ACID/MULTIVITS-MIN/LUT (CENTRUM SILVER ORAL)] Take by mouth. womens ultra, Disp: , Rfl:      MV,CA,MIN/FA/HERBAL NO.158 (ESTROVEN ENERGY ORAL), [MV,CA,MIN/FA/HERBAL NO.158 (ESTROVEN ENERGY ORAL)] Take by mouth., Disp: , Rfl:      polyethylene glycol (MIRALAX) 17 GM/Dose powder, Take 17 g (1 capful) by mouth daily, Disp: 116 g, Rfl: 0     simvastatin (ZOCOR) 10 MG tablet, Take 1 tablet (10 mg) by mouth At Bedtime, Disp: 90 tablet, Rfl: 3     vitamin A-vitamin C-vit E-min (OCUVITE) Tab tablet, [VITAMIN A-VITAMIN C-VIT E-MIN (OCUVITE) TAB TABLET] Take 2 tablets by mouth daily., Disp: , Rfl:      vitamin D3 (CHOLECALCIFEROL) 50 mcg (2000 units) tablet, Take 1 tablet by mouth daily, Disp: , Rfl:     ALLERGIES:  No Known Allergies    FAMILY HISTORY:  Family History   Problem Relation Age of Onset     Breast Cancer Mother         Unsure of age     Breast Cancer Maternal Aunt 65.00     Breast Cancer Maternal Aunt 65.00     Breast Cancer Sister 42.00       SOCIAL HISTORY:   Social History     Socioeconomic History     Marital status:    Tobacco Use     Smoking status: Never     Smokeless tobacco: Never       VITALS:  Patient Vitals for the past 24 hrs:   BP Temp Temp src Pulse Resp SpO2   01/08/23 1630 -- -- -- 70 -- 95 %   01/08/23 1250 119/64 98.1  F (36.7  C) Oral 68 16 99 %       PHYSICAL EXAM    Constitutional: Well developed, Well nourished, NAD,  Frail-appearing.  Not uncomfortable, ill or  toxic appearing  HENT: Normocephalic, Atraumatic, Bilateral external ears normal, Oropharynx normal, mucous membranes moist, Nose normal.   Neck: Normal range of motion, No tenderness, Supple, No stridor.  Eyes: PERRL, EOMI, Conjunctiva normal, No discharge.   Respiratory: Normal breath sounds, No respiratory distress, No wheezing, Speaks full sentences easily. No cough.  Cardiovascular: Normal heart rate, Regular rhythm, No murmurs, No rubs, No gallops. Chest wall nontender.  GI: Soft, tender on her right upper quadrant of her abdomen as well as her right rib cage, remainder of abdominal exam is benign.  No distention, rebound, guarding or masses.  She has normal bowel sounds to the left upper and left lower quadrants of her abdomen, hypoactive bowel sounds to her right lower quadrant.  No CVA tenderness bilaterally.  :  On rectal exam with nurse chaperone present, no fecal impaction appreciated, unable to feel stool with finger in rectal vault.   Musculoskeletal: 2+ DP pulses. No edema. No cyanosis, No clubbing. Good range of motion in all major joints.  Tender on palpation of her left hip, but no surrounding skin erythema, rashes or warmth. Full ROM of left hip. No tenderness to palpation of other major deformities noted. No tenderness of the CTLS spine.   Integument: Warm, Dry, No erythema, small area of ecchymosis to her right rib cage/ right flank, no bleeding or open wounds  Neurologic: Alert & oriented x 3, Normal motor function, Normal sensory function, No focal deficits noted. Normal gait.  Psychiatric: Affect normal, Judgment normal, Mood normal. Cooperative.    LAB:  All pertinent labs reviewed and interpreted.  Labs Ordered and Resulted from Time of ED Arrival to Time of ED Departure   HEPATIC FUNCTION PANEL - Abnormal       Result Value    Bilirubin Total 0.7      Bilirubin Direct 0.3      Protein Total 6.6      Albumin 3.3 (*)     Alkaline Phosphatase 57      AST 28      ALT 19     CBC WITH  PLATELETS - Normal    WBC Count 5.4      RBC Count 3.96      Hemoglobin 12.5      Hematocrit 37.9      MCV 96      MCH 31.6      MCHC 33.0      RDW 13.5      Platelet Count 217     BASIC METABOLIC PANEL - Normal    Sodium 142      Potassium 4.5      Chloride 106      Carbon Dioxide (CO2) 24      Anion Gap 12      Urea Nitrogen 18      Creatinine 0.79      Calcium 9.0      Glucose 98      GFR Estimate 77     LIPASE - Normal    Lipase 32         RADIOLOGY:  Reviewed all pertinent imaging. Please see official radiology report.  Ribs XR, unilat 3 views + PA chest, right   Final Result   IMPRESSION: The visualized heart and lungs are negative for acute abnormality. No rib fractures.      Calcified granuloma in the left lower lobe. Excreted contrast material in the kidneys.      XR Pelvis and Hip Bilateral 2 Views   Final Result   IMPRESSION: Both hips are negative for fracture or dislocation. Pelvis negative for fracture. Contrast material within the distal ureters and urinary bladder.      CT Abdomen Pelvis w Contrast   Final Result   IMPRESSION:    No obstruction or acute inflammation. Moderate colonic stool burden.             EKG:    None     PROCEDURES:   None    Diagnosis:  1. Constipation    2. Abdominal pain, generalized      Denisse Fletcher PA-C  Emergency Medicine  [facility]  1/8/2023       Denisse Fletcher PA-C  01/08/23 1800

## 2023-01-08 NOTE — ED TRIAGE NOTES
Pt arrives with her  for concerns of constipation, right sided lower abdominal pain and right upper abdominal swelling.  Also complains of left hip pain.    Pt did have a fall on Wednesday.  Pt was here on Friday morning in the ER.

## 2023-01-08 NOTE — DISCHARGE INSTRUCTIONS
Please bring this paperwork with you to your follow-up appointment.    You were seen in the urgent care/emergency department for constipation and abdominal pain.     You had a CT scan done today, which showed ongoing constipation, but no bowel obstruction or other emergent findings. Your blood work otherwise looks good today and your Xrays were negative for fractures of your ribs and hips.     You were given an enema during today's visit and had a large bowel movement after this.     For your symptoms:  Continue taking the stool softeners given to you at your recent visit, we will send you home with an additional prescription for Colace. Please take this twice a day for 10 days.     Drink plenty of fluids and stay well hydrated, this will help prevent constipation.     Tylenol/ibuprofen as needed  You may take up to 650 mg of Tylenol (acetaminophen) up to 4 times daily and up to 600 mg of ibuprofen up to 4 times daily as needed for fever, pain.  Please do not take more than the daily maximum recommended dose (tylenol = 4 grams, ibuprofen = 2.4 grams) as it can cause harm to your liver, kidneys, stomach.  It is best to take ibuprofen with food. Please read labels of any over-the-counter medicine you may be taking as it may contain Tylenol (acetaminophen) or Advil (ibuprofen).     Follow up with your primary care provider for recheck in 3 days for ER follow up.     Return to the emergency department if you develop worsening pain, vomiting, fevers , or any other new worsening or concerning symptoms. We'd be happy to see you again.    Thank you for allowing us to be part of your care today.    Take care!  -Denisse Fletcher PA-C

## 2023-01-08 NOTE — ED PROVIDER NOTES
Emergency Department Midlevel Supervisory Note     I personally saw the patient and performed a substantive portion of the visit including all aspects of the medical decision making.    ED Course:  3:56 PM Denisse Fletcher PA-C staffed patient with me. I agree with their assessment and plan of management, and I will see the patient.  4:01 PM I met with the patient to introduce myself, gather additional history, perform my initial exam, and discuss the plan.   5:58 PM Patient discharged.    Brief HPI:     Zahra Fitzgerald is a 77 year old female who presents for evaluation of constipation and abdominal pain.    The patient reports ongoing constipation and right sided abdominal pain since ED visit on 1/6/23. She tried an enema at home, had an episode of diarrhea after, but does not think her constipation or abdominal pain has improved after the bowel movement. She has also been taking stool softeners without relief. Her pain is worse with movement and laughing, no pain at rest.    I, Eugenio Dejesus, am serving as a scribe to document services personally performed by Kit Saleem MD, based on my observations and the provider's statements to me.   I, Kit Saleem MD attest that Eugenio Dejesus was acting in a scribe capacity, has observed my performance of the services and has documented them in accordance with my direction.    Brief Physical Exam: BP (!) 140/70 (BP Location: Right arm, Patient Position: Semi-Roberts's)   Pulse 70   Temp 98.1  F (36.7  C) (Oral)   Resp 18   SpO2 98%   Constitutional:  Alert, in no acute distress  EYES: Conjunctivae clear  HENT:  Atraumatic, normocephalic  Respiratory:  Respirations even, unlabored, in no acute respiratory distress  Cardiovascular:  Regular rate and rhythm, good peripheral perfusion  GI: Soft, nondistended, nontender, no palpable masses, no rebound, no guarding   Musculoskeletal:  No edema. No cyanosis. Range of motion major extremities intact.  Focal  tenderness to the right lateral rib cage.  Integument: Warm, Dry, No erythema, No rash.   Neurologic:  Alert & oriented, no focal deficits noted  Psych: Normal mood and affect     MDM:  Again, patient is 77-year-old female who presents with abdominal pain.  Patient is afebrile with no tachycardia or hypoxia.  Review the medical record shows patient was recently evaluated on 1/6/2023 and labs, imaging and urine studies at that time were negative.  Patient also endorses some right upper quadrant/right sided rib pain, left hip pain but denies any recent falls or trauma.  Ultrasound imaging on 1/6 was unremarkable, urine and labs showed no acute concerning findings and CT imaging showed moderate colonic stool burden the patient was discharged home with enema which  states was not successful at home.  Here today we will obtain repeat screening labs, plain films of the hip and pelvis, right chest wall, and repeat CT imaging to rule out perforation or obstruction. SUSANA performed rectal exam and, per report, no hard stool at rectal vault.  If imaging unremarkable will offer enema here in the emergency department.       1. Constipation    2. Abdominal pain, generalized        Labs and Imaging:  Results for orders placed or performed during the hospital encounter of 01/08/23   Ribs XR, unilat 3 views + PA chest, right    Impression    IMPRESSION: The visualized heart and lungs are negative for acute abnormality. No rib fractures.    Calcified granuloma in the left lower lobe. Excreted contrast material in the kidneys.   CT Abdomen Pelvis w Contrast    Impression    IMPRESSION:   No obstruction or acute inflammation. Moderate colonic stool burden.     XR Pelvis and Hip Bilateral 2 Views    Impression    IMPRESSION: Both hips are negative for fracture or dislocation. Pelvis negative for fracture. Contrast material within the distal ureters and urinary bladder.   CBC (+ platelets, no diff)   Result Value Ref Range    WBC  Count 5.4 4.0 - 11.0 10e3/uL    RBC Count 3.96 3.80 - 5.20 10e6/uL    Hemoglobin 12.5 11.7 - 15.7 g/dL    Hematocrit 37.9 35.0 - 47.0 %    MCV 96 78 - 100 fL    MCH 31.6 26.5 - 33.0 pg    MCHC 33.0 31.5 - 36.5 g/dL    RDW 13.5 10.0 - 15.0 %    Platelet Count 217 150 - 450 10e3/uL   Basic metabolic panel   Result Value Ref Range    Sodium 142 136 - 145 mmol/L    Potassium 4.5 3.5 - 5.0 mmol/L    Chloride 106 98 - 107 mmol/L    Carbon Dioxide (CO2) 24 22 - 31 mmol/L    Anion Gap 12 5 - 18 mmol/L    Urea Nitrogen 18 8 - 28 mg/dL    Creatinine 0.79 0.60 - 1.10 mg/dL    Calcium 9.0 8.5 - 10.5 mg/dL    Glucose 98 70 - 125 mg/dL    GFR Estimate 77 >60 mL/min/1.73m2   Result Value Ref Range    Lipase 32 0 - 52 U/L   Hepatic function panel   Result Value Ref Range    Bilirubin Total 0.7 0.0 - 1.0 mg/dL    Bilirubin Direct 0.3 <=0.5 mg/dL    Protein Total 6.6 6.0 - 8.0 g/dL    Albumin 3.3 (L) 3.5 - 5.0 g/dL    Alkaline Phosphatase 57 45 - 120 U/L    AST 28 0 - 40 U/L    ALT 19 0 - 45 U/L     I have reviewed the relevant laboratory and radiology studies    Procedures:  I was present for the key portions of this procedure: none      I, Eugenio Dejesus, am serving as a scribe to document services personally performed by Kit Saleem MD based on my observations and the provider's statements to me.  I, Kit Saleem MD, attest that Eugenio Dejesus is acting in a scribe capacity, has observed my performance of the services and has documented them in accordance with my direction.      Kit Saleem MD  [facility] EMERGENCY ROOM  [address] [facility] [address] [id number] [phone]     Kit Saleem MD  01/08/23 2011

## 2023-04-18 ENCOUNTER — HOSPITAL ENCOUNTER (EMERGENCY)
Facility: CLINIC | Age: 78
Discharge: HOME | End: 2023-04-18
Attending: EMERGENCY MEDICINE | Admitting: EMERGENCY MEDICINE
Payer: MEDICARE

## 2023-04-18 ENCOUNTER — APPOINTMENT (OUTPATIENT)
Dept: CT IMAGING | Facility: CLINIC | Age: 78
End: 2023-04-18
Attending: EMERGENCY MEDICINE
Payer: MEDICARE

## 2023-04-18 VITALS
RESPIRATION RATE: 18 BRPM | TEMPERATURE: 97.3 F | OXYGEN SATURATION: 99 % | HEART RATE: 74 BPM | SYSTOLIC BLOOD PRESSURE: 111 MMHG | DIASTOLIC BLOOD PRESSURE: 54 MMHG

## 2023-04-18 DIAGNOSIS — R10.84 ABDOMINAL PAIN, GENERALIZED: ICD-10-CM

## 2023-04-18 DIAGNOSIS — K59.00 CONSTIPATION, UNSPECIFIED CONSTIPATION TYPE: ICD-10-CM

## 2023-04-18 LAB
ALBUMIN SERPL-MCNC: 3.7 G/DL (ref 3.5–5)
ALP SERPL-CCNC: 73 U/L (ref 45–120)
ALT SERPL W P-5'-P-CCNC: 16 U/L (ref 0–45)
ANION GAP SERPL CALCULATED.3IONS-SCNC: 8 MMOL/L (ref 5–18)
AST SERPL W P-5'-P-CCNC: 22 U/L (ref 0–40)
BASOPHILS # BLD AUTO: 0 10E3/UL (ref 0–0.2)
BASOPHILS NFR BLD AUTO: 1 %
BILIRUB SERPL-MCNC: 0.4 MG/DL (ref 0–1)
BUN SERPL-MCNC: 27 MG/DL (ref 8–28)
CALCIUM SERPL-MCNC: 9.5 MG/DL (ref 8.5–10.5)
CHLORIDE BLD-SCNC: 105 MMOL/L (ref 98–107)
CO2 SERPL-SCNC: 27 MMOL/L (ref 22–31)
CREAT SERPL-MCNC: 0.92 MG/DL (ref 0.6–1.1)
EOSINOPHIL # BLD AUTO: 0.1 10E3/UL (ref 0–0.7)
EOSINOPHIL NFR BLD AUTO: 2 %
ERYTHROCYTE [DISTWIDTH] IN BLOOD BY AUTOMATED COUNT: 13.2 % (ref 10–15)
GFR SERPL CREATININE-BSD FRML MDRD: 64 ML/MIN/1.73M2
GLUCOSE BLD-MCNC: 95 MG/DL (ref 70–125)
HCT VFR BLD AUTO: 37.5 % (ref 35–47)
HGB BLD-MCNC: 12.6 G/DL (ref 11.7–15.7)
IMM GRANULOCYTES # BLD: 0 10E3/UL
IMM GRANULOCYTES NFR BLD: 0 %
LACTATE SERPL-SCNC: 0.5 MMOL/L (ref 0.7–2)
LIPASE SERPL-CCNC: 46 U/L (ref 0–52)
LYMPHOCYTES # BLD AUTO: 2.2 10E3/UL (ref 0.8–5.3)
LYMPHOCYTES NFR BLD AUTO: 34 %
MCH RBC QN AUTO: 31.7 PG (ref 26.5–33)
MCHC RBC AUTO-ENTMCNC: 33.6 G/DL (ref 31.5–36.5)
MCV RBC AUTO: 94 FL (ref 78–100)
MONOCYTES # BLD AUTO: 0.6 10E3/UL (ref 0–1.3)
MONOCYTES NFR BLD AUTO: 9 %
NEUTROPHILS # BLD AUTO: 3.5 10E3/UL (ref 1.6–8.3)
NEUTROPHILS NFR BLD AUTO: 54 %
NRBC # BLD AUTO: 0 10E3/UL
NRBC BLD AUTO-RTO: 0 /100
PLATELET # BLD AUTO: 242 10E3/UL (ref 150–450)
POTASSIUM BLD-SCNC: 4 MMOL/L (ref 3.5–5)
PROT SERPL-MCNC: 7 G/DL (ref 6–8)
RBC # BLD AUTO: 3.98 10E6/UL (ref 3.8–5.2)
SODIUM SERPL-SCNC: 140 MMOL/L (ref 136–145)
WBC # BLD AUTO: 6.4 10E3/UL (ref 4–11)

## 2023-04-18 PROCEDURE — 85025 COMPLETE CBC W/AUTO DIFF WBC: CPT | Performed by: EMERGENCY MEDICINE

## 2023-04-18 PROCEDURE — 80053 COMPREHEN METABOLIC PANEL: CPT | Performed by: EMERGENCY MEDICINE

## 2023-04-18 PROCEDURE — G1010 CDSM STANSON: HCPCS

## 2023-04-18 PROCEDURE — 83690 ASSAY OF LIPASE: CPT | Performed by: EMERGENCY MEDICINE

## 2023-04-18 PROCEDURE — 96360 HYDRATION IV INFUSION INIT: CPT | Mod: 59

## 2023-04-18 PROCEDURE — 258N000003 HC RX IP 258 OP 636: Performed by: EMERGENCY MEDICINE

## 2023-04-18 PROCEDURE — 99285 EMERGENCY DEPT VISIT HI MDM: CPT | Mod: 25

## 2023-04-18 PROCEDURE — 250N000011 HC RX IP 250 OP 636: Performed by: EMERGENCY MEDICINE

## 2023-04-18 PROCEDURE — 96361 HYDRATE IV INFUSION ADD-ON: CPT

## 2023-04-18 PROCEDURE — 83605 ASSAY OF LACTIC ACID: CPT | Performed by: EMERGENCY MEDICINE

## 2023-04-18 PROCEDURE — 250N000013 HC RX MED GY IP 250 OP 250 PS 637: Performed by: EMERGENCY MEDICINE

## 2023-04-18 PROCEDURE — 36415 COLL VENOUS BLD VENIPUNCTURE: CPT | Performed by: EMERGENCY MEDICINE

## 2023-04-18 RX ORDER — ONDANSETRON 2 MG/ML
4 INJECTION INTRAMUSCULAR; INTRAVENOUS EVERY 30 MIN PRN
Status: DISCONTINUED | OUTPATIENT
Start: 2023-04-18 | End: 2023-04-18 | Stop reason: HOSPADM

## 2023-04-18 RX ORDER — SODIUM CHLORIDE 9 MG/ML
INJECTION, SOLUTION INTRAVENOUS CONTINUOUS
Status: DISCONTINUED | OUTPATIENT
Start: 2023-04-18 | End: 2023-04-18 | Stop reason: HOSPADM

## 2023-04-18 RX ORDER — IOPAMIDOL 755 MG/ML
90 INJECTION, SOLUTION INTRAVASCULAR ONCE
Status: COMPLETED | OUTPATIENT
Start: 2023-04-18 | End: 2023-04-18

## 2023-04-18 RX ORDER — HYDROMORPHONE HYDROCHLORIDE 1 MG/ML
0.5 INJECTION, SOLUTION INTRAMUSCULAR; INTRAVENOUS; SUBCUTANEOUS
Status: DISCONTINUED | OUTPATIENT
Start: 2023-04-18 | End: 2023-04-18 | Stop reason: HOSPADM

## 2023-04-18 RX ADMIN — SODIUM CHLORIDE 1000 ML: 9 INJECTION, SOLUTION INTRAVENOUS at 01:37

## 2023-04-18 RX ADMIN — Medication 226 ML: at 04:09

## 2023-04-18 RX ADMIN — IOPAMIDOL 90 ML: 755 INJECTION, SOLUTION INTRAVENOUS at 02:43

## 2023-04-18 ASSESSMENT — ENCOUNTER SYMPTOMS
ABDOMINAL PAIN: 1
HEMATURIA: 0
NAUSEA: 0
DIARRHEA: 0
CONSTIPATION: 0
DYSURIA: 0
FREQUENCY: 0
VOMITING: 0

## 2023-04-18 ASSESSMENT — ACTIVITIES OF DAILY LIVING (ADL)
ADLS_ACUITY_SCORE: 35
ADLS_ACUITY_SCORE: 35
ADLS_ACUITY_SCORE: 33

## 2023-04-18 NOTE — ED PROVIDER NOTES
EMERGENCY DEPARTMENT ENCOUNTER      NAME: Zahra Fitzgerald  AGE: 77 year old female  YOB: 1945  MRN: [id number]  EVALUATION DATE & TIME: 4/18/2023 12:50 AM    PCP: Kushal Motley    ED PROVIDER: Justin Clement M.D.      Chief Complaint   Patient presents with     Abdominal Pain         FINAL IMPRESSION:  1. Abdominal pain, generalized    2. Constipation, unspecified constipation type          ED COURSE & MEDICAL DECISION MAKING:    Pertinent Labs & Imaging studies reviewed. (See chart for details)  77 year old female presents to the Emergency Department for evaluation of nominal pain.  Pain is more radiating into the right side.  Has had an appendectomy but did get a CT scan to look for better cause of her abdominal pain including obstruction, colitis, enteritis.  CT scan does not reveal any of these but does show findings of constipation which is likely cause of her symptoms.  Labs otherwise unremarkable.  Did give an enema here.  Patient feeling better after this and had good results.  We will continue MiraLAX at home.  Return for worsening symptoms.    1:10 AM I met with the patient to gather history and to perform my initial exam. I discussed the plan for care while in the Emergency Department. PPE: surgical mask and gloves.    At the conclusion of the encounter I discussed the results of all of the tests and the disposition. The questions were answered. The patient or family acknowledged understanding and was agreeable with the care plan.     Medical Decision Making    History:    Supplemental history from: Documented in chart, if applicable    External Record(s) reviewed: Documented in chart, if applicable.    Work Up:    Chart documentation includes differential considered and any EKGs or imaging independently interpreted by provider, where specified.    In additional to work up documented, I considered the following work up: Documented in chart, if applicable.    External  consultation:    Discussion of management with another provider: Documented in chart, if applicable    Complicating factors:    Care impacted by chronic illness: N/A    Care affected by social determinants of health: N/A    Disposition considerations: Discharge. I recommended the patient continue their current prescription strength medication(s): Miralax. N/A.             MEDICATIONS GIVEN IN THE EMERGENCY:  Medications   0.9% sodium chloride BOLUS (0 mLs Intravenous Stopped 4/18/23 0350)     Followed by   sodium chloride 0.9% infusion ( Intravenous Not Given 4/18/23 0351)   ondansetron (ZOFRAN) injection 4 mg (has no administration in time range)   ondansetron (ZOFRAN) injection 4 mg (has no administration in time range)   HYDROmorphone (PF) (DILAUDID) injection 0.5 mg (has no administration in time range)   iopamidol (ISOVUE-370) solution 90 mL (90 mLs Intravenous $Given 4/18/23 0243)   Enema Compound (docusate/mineral oil/NaPhos) NO MAG CIT PREMIX (226 mLs Rectal $Given 4/18/23 0409)       NEW PRESCRIPTIONS STARTED AT TODAY'S ER VISIT  Discharge Medication List as of 4/18/2023  5:22 AM             =================================================================    HPI    Patient information was obtained from: Patient and Patient's     Use of : N/A        Zahra Fitzgerald is a 77 year old female with a pertinent history of HTN, hyperlipidemia, and diverticulosis, who presents to this ED via walk-in for evaluation of abdominal pain.    Patient is a poor historian    Patient reports right lower quadrant abdominal pain that started two days ago. No prior history of this. Patient's  notes that she was seen a couple months ago for constipation and was given an enema with relief. Otherwise, she denies any urinary symptoms, irregular bowel movements, nausea, and vomiting. She does wear Depends. Patient denies any previous abdominal surgeries but patient underwent an appendectomy in 2009 per  chart review. Otherwise, she denies any allergies to medications. No other complaints at this time.    REVIEW OF SYSTEMS   Review of Systems   Gastrointestinal: Positive for abdominal pain (RLQ). Negative for constipation, diarrhea, nausea and vomiting.   Genitourinary: Negative for dysuria, frequency, hematuria and urgency.   All other systems reviewed and are negative.     PAST MEDICAL HISTORY:  Medical history reviewed. Chart shows history of HTN, hyperlipidemia, and diverticulosis.    PAST SURGICAL HISTORY:  Past Surgical History:   Procedure Laterality Date     [facility] APPENDECTOMY      Description: Appendectomy;  Recorded: 03/12/2009;           CURRENT MEDICATIONS:    Current Facility-Administered Medications   Medication     denosumab (PROLIA) injection 60 mg     HYDROmorphone (PF) (DILAUDID) injection 0.5 mg     ondansetron (ZOFRAN) injection 4 mg     ondansetron (ZOFRAN) injection 4 mg     sodium chloride 0.9% infusion     Current Outpatient Medications   Medication     atenolol (TENORMIN) 25 MG tablet     aspirin 81 mg chewable tablet     black cohosh 540 mg cap     calcium carbonate 600 mg-vitamin D 400 units (CALTRATE) 600-400 MG-UNIT per tablet     denosumab 60 mg/mL Syrg     docusate sodium (COLACE) 100 MG capsule     FOLIC ACID/MULTIVITS-MIN/LUT (CENTRUM SILVER ORAL)     MV,CA,MIN/FA/HERBAL NO.158 (ESTROVEN ENERGY ORAL)     polyethylene glycol (MIRALAX) 17 GM/Dose powder     simvastatin (ZOCOR) 10 MG tablet     vitamin A-vitamin C-vit E-min (OCUVITE) Tab tablet     vitamin D3 (CHOLECALCIFEROL) 50 mcg (2000 units) tablet         ALLERGIES:  No Known Allergies    FAMILY HISTORY:  Family History   Problem Relation Age of Onset     Breast Cancer Mother         Unsure of age     Breast Cancer Maternal Aunt 65.00     Breast Cancer Maternal Aunt 65.00     Breast Cancer Sister 42.00       SOCIAL HISTORY:   Social History     Socioeconomic History     Marital status:    Tobacco Use     Smoking status: Never      Smokeless tobacco: Never       VITALS:  /54   Pulse 74   Temp 97.3  F (36.3  C) (Temporal)   Resp 18   SpO2 99%     PHYSICAL EXAM    Physical Exam  Vitals and nursing note reviewed.   Constitutional:       General: She is not in acute distress.     Appearance: She is not diaphoretic.   HENT:      Head: Atraumatic.      Mouth/Throat:      Pharynx: No oropharyngeal exudate.   Eyes:      General: No scleral icterus.     Pupils: Pupils are equal, round, and reactive to light.   Cardiovascular:      Rate and Rhythm: Normal rate and regular rhythm.      Heart sounds: Normal heart sounds.   Pulmonary:      Effort: No respiratory distress.      Breath sounds: Normal breath sounds.   Abdominal:      Palpations: Abdomen is soft.      Tenderness: There is abdominal tenderness in the right lower quadrant. There is no guarding or rebound. Negative signs include Saini's sign.   Musculoskeletal:         General: No tenderness.   Skin:     General: Skin is warm.      Findings: No rash.   Neurological:      General: No focal deficit present.      Mental Status: She is alert.           LAB:  All pertinent labs reviewed and interpreted.  Labs Ordered and Resulted from Time of ED Arrival to Time of ED Departure   LACTIC ACID WHOLE BLOOD - Abnormal       Result Value    Lactic Acid 0.5 (*)    COMPREHENSIVE METABOLIC PANEL - Normal    Sodium 140      Potassium 4.0      Chloride 105      Carbon Dioxide (CO2) 27      Anion Gap 8      Urea Nitrogen 27      Creatinine 0.92      Calcium 9.5      Glucose 95      Alkaline Phosphatase 73      AST 22      ALT 16      Protein Total 7.0      Albumin 3.7      Bilirubin Total 0.4      GFR Estimate 64     LIPASE - Normal    Lipase 46     CBC WITH PLATELETS AND DIFFERENTIAL    WBC Count 6.4      RBC Count 3.98      Hemoglobin 12.6      Hematocrit 37.5      MCV 94      MCH 31.7      MCHC 33.6      RDW 13.2      Platelet Count 242      % Neutrophils 54      % Lymphocytes 34      %  Monocytes 9      % Eosinophils 2      % Basophils 1      % Immature Granulocytes 0      NRBCs per 100 WBC 0      Absolute Neutrophils 3.5      Absolute Lymphocytes 2.2      Absolute Monocytes 0.6      Absolute Eosinophils 0.1      Absolute Basophils 0.0      Absolute Immature Granulocytes 0.0      Absolute NRBCs 0.0         RADIOLOGY:  Reviewed all pertinent imaging. Please see official radiology report.  CT Abdomen Pelvis w Contrast   Final Result   IMPRESSION:    1.  No acute inflammatory changes in the abdomen or pelvis. Formed stool material throughout normal caliber redundant colon without mechanical obstruction or free gas. Colonic diverticulosis, more apparent distally, without acute inflammation. Small    hiatal hernia.      2.  Evidence of remote granulomatous disease. Left renal cortical simple cyst, no specific follow-up required. Retroverted postmenopausal uterus with a small enhancing fibroid anteriorly, unchanged.      3.  Degenerative changes involving the spine and joints of the pelvis. Low-grade anterolisthesis of L4 relative to L5, and L5 relative to S1 attributable to degenerative facet arthropathy. Right convex thoracolumbar curve.            I, Delilah Marie, am serving as a scribe to document services personally performed by Dr. Justin Clement, based on my observation and the provider's statements to me. I, Justin Clement MD attest that Delilah Marie is acting in a scribe capacity, has observed my performance of the services and has documented them in accordance with my direction.    Justin Clement M.D.  Emergency Medicine  [facility] EMERGENCY ROOM  [address] [id number] [phone]  Dept: [phone]     Justin Clement MD  04/18/23 0616

## 2023-04-18 NOTE — ED TRIAGE NOTES
Patient arrives to the ER with RLQ abdominal pain.  Patient it started yesterday and has progressively worsened.  No nausea. Patient cannot recall the last time she had a bowel movement, but the last time they were hard tadeo. VSS.     Triage Assessment     Row Name 04/18/23 0028       Triage Assessment (Adult)    Airway WDL WDL       Respiratory WDL    Respiratory WDL WDL       Skin Circulation/Temperature WDL    Skin Circulation/Temperature WDL WDL       Cardiac WDL    Cardiac WDL WDL       Peripheral/Neurovascular WDL    Peripheral Neurovascular WDL WDL       Cognitive/Neuro/Behavioral WDL    Cognitive/Neuro/Behavioral WDL WDL

## 2023-04-19 ENCOUNTER — PATIENT OUTREACH (OUTPATIENT)
Dept: INTERNAL MEDICINE | Facility: CLINIC | Age: 78
End: 2023-04-19
Payer: MEDICARE

## 2023-06-27 ENCOUNTER — HOSPITAL ENCOUNTER (EMERGENCY)
Facility: CLINIC | Age: 78
Discharge: HOME | End: 2023-06-27
Attending: EMERGENCY MEDICINE | Admitting: EMERGENCY MEDICINE
Payer: MEDICARE

## 2023-06-27 ENCOUNTER — APPOINTMENT (OUTPATIENT)
Dept: CT IMAGING | Facility: CLINIC | Age: 78
End: 2023-06-27
Attending: EMERGENCY MEDICINE
Payer: MEDICARE

## 2023-06-27 VITALS
DIASTOLIC BLOOD PRESSURE: 56 MMHG | BODY MASS INDEX: 28.51 KG/M2 | SYSTOLIC BLOOD PRESSURE: 131 MMHG | RESPIRATION RATE: 18 BRPM | HEART RATE: 71 BPM | OXYGEN SATURATION: 97 % | TEMPERATURE: 98.1 F | WEIGHT: 146 LBS

## 2023-06-27 DIAGNOSIS — M54.41 ACUTE RIGHT-SIDED LOW BACK PAIN WITH RIGHT-SIDED SCIATICA: ICD-10-CM

## 2023-06-27 PROCEDURE — 250N000011 HC RX IP 250 OP 636: Mod: JZ | Performed by: EMERGENCY MEDICINE

## 2023-06-27 PROCEDURE — 250N000013 HC RX MED GY IP 250 OP 250 PS 637: Performed by: EMERGENCY MEDICINE

## 2023-06-27 PROCEDURE — 96374 THER/PROPH/DIAG INJ IV PUSH: CPT

## 2023-06-27 PROCEDURE — 72131 CT LUMBAR SPINE W/O DYE: CPT | Mod: MF

## 2023-06-27 PROCEDURE — G1010 CDSM STANSON: HCPCS

## 2023-06-27 PROCEDURE — 99285 EMERGENCY DEPT VISIT HI MDM: CPT | Mod: 25

## 2023-06-27 RX ORDER — LIDOCAINE 4 G/G
1 PATCH TOPICAL ONCE
Status: DISCONTINUED | OUTPATIENT
Start: 2023-06-27 | End: 2023-06-27 | Stop reason: HOSPADM

## 2023-06-27 RX ORDER — ACETAMINOPHEN 325 MG/1
975 TABLET ORAL ONCE
Status: COMPLETED | OUTPATIENT
Start: 2023-06-27 | End: 2023-06-27

## 2023-06-27 RX ORDER — KETOROLAC TROMETHAMINE 15 MG/ML
15 INJECTION, SOLUTION INTRAMUSCULAR; INTRAVENOUS ONCE
Status: COMPLETED | OUTPATIENT
Start: 2023-06-27 | End: 2023-06-27

## 2023-06-27 RX ADMIN — LIDOCAINE 1 PATCH: 246 PATCH TOPICAL at 13:23

## 2023-06-27 RX ADMIN — ACETAMINOPHEN 975 MG: 325 TABLET ORAL at 13:16

## 2023-06-27 RX ADMIN — TIZANIDINE 4 MG: 4 TABLET ORAL at 13:31

## 2023-06-27 RX ADMIN — KETOROLAC TROMETHAMINE 15 MG: 15 INJECTION, SOLUTION INTRAMUSCULAR; INTRAVENOUS at 13:19

## 2023-06-27 ASSESSMENT — ACTIVITIES OF DAILY LIVING (ADL): ADLS_ACUITY_SCORE: 35

## 2023-06-27 NOTE — ED PROVIDER NOTES
"EMERGENCY DEPARTMENT ENCOUNTER      NAME: Zahra Fitzgerald  AGE: 78 year old female  YOB: 1945  MRN: [id number]  EVALUATION DATE & TIME: 6/27/2023 12:12 PM    PCP: Kushal Motley    ED PROVIDER: Gordo Ramon M.D.      Chief Complaint   Patient presents with     Back Pain         IMPRESSION  1. Acute right-sided low back pain with right-sided sciatica        PLAN  - NSAIDs, tizanidine, Lidoderm, ice, exercise for home  - close PCP follow up  - discharge to home    ED COURSE & MEDICAL DECISION MAKING    ED Course as of 06/27/23 1449   Tue Jun 27, 2023   1219 78yoF with history of Alzheimer's, HTN, HLD, osteoporosis presenting per EMS from home with her  for evaluation of right low back pain. Went to bed last night feeling fine then awoke this morning around 9am (about 3 hours ago) with right low back pain radiating down back of right leg to mid thigh; unable to get herself up due to pain so EMS called. Given 75mcg fentanyl en route with improvement. Here now in the ED, patient states she feels fine. Denies any abdominal pain, nausea, vomiting. No numbness, tingling, focal weakness. No rash, fevers, sweats, chills. Has been doing well recently per family. Usually walks on her own; no cane or walker but \"she always seems to hold onto something\" per .    Normal vitals on presentation. Calm on exam with clear lungs, normal work of breathing, benign abdomen, no CVA tenderness, no midline spinal tenderness but does have right lumbar paraspinal muscle tenderness/spasm with positive straight leg-raise on right, no specific pain with passive ROM of right hip, full strength & sensation intact to BLE. Has pain with attempts to sit upright in bed; not her baseline from this standpoint per family.    Most suspect muscle spasm driving presentation. Doubt pyelo; not correct location on exam. Doubt intraabdominal etiology with no abdominal pain or tenderness on exam. Given age with no prior pains " like this, will obtain CT while giving Toradol, Tylenol, tizanidine, Lidoderm, ice pack for pain. Patient & family comfortable with this plan; no further questions at this time.   1317 CT independently reviewed & interpreted by me: no fracture or severe stenosis.   1438 CT with mild spondylosis & foraminal narrowing but no acute interven-able process. Patient feeling well on recheck; able to get up on her own and walk at baseline per family. Doubt emergent life-threatening etiology or benefit to additional workup here now. Ok for outpatient management. Patient & family comfortable with discharge at this time. Return precautions and need for PCP follow up discussed and understood. No further questions at the time of discharge.       --------------------------------------------------------------------------------   --------------------------------------------------------------------------------     12:38 PM I met with the patient for the initial interview and physical examination. Discussed plan for treatment and workup in the ED.  2:08 PM I rechecked the patient and updated them on imaging results. We discussed the plan for discharge and the patient is agreeable. Reviewed supportive cares, symptomatic treatment, outpatient follow up, and reasons to return to the Emergency Department. Patient to be discharged by ED RN.       This patient involved a high degree of complexity in medical decision making, as significant risks were present and assessed. Recent encounters & results in medical record reviewed by me.    All workup (i.e. any EKG/labs/imaging as per charting below) reviewed and independently interpreted by me. See respective sections for details.    Broad differential considered for this patient presenting, including but not limited to:  Muscle spasm, sciatica, vertebral fracture, spinal epidural abscess, spinal epidural hematoma, conus medullaris syndrome, cauda equina syndrome, AAA, other referred  intraabdominal etiology, hip joint pathology      See additional MDM below if interested.      MEDICATIONS GIVEN IN THE EMERGENCY DEPARTMENT  Medications   Lidocaine (LIDOCARE) 4 % Patch 1 patch (1 patch Transdermal $Patch/Med Applied 6/27/23 1323)   ketorolac (TORADOL) injection 15 mg (15 mg Intravenous $Given 6/27/23 1319)   acetaminophen (TYLENOL) tablet 975 mg (975 mg Oral $Given 6/27/23 1316)   tiZANidine (ZANAFLEX) tablet 4 mg (4 mg Oral $Given 6/27/23 1331)       NEW PRESCRIPTIONS STARTED AT TODAY'S ER VISIT  Discharge Medication List as of 6/27/2023  2:29 PM      START taking these medications    Details   tiZANidine (ZANAFLEX) 4 MG tablet Take 1 tablet (4 mg) by mouth 3 times daily as needed for muscle spasms, Disp-10 tablet, R-0, E-Prescribe         CONTINUE these medications which have NOT CHANGED    Details   atenolol (TENORMIN) 25 MG tablet TAKE ONE TABLET BY MOUTH ONE TIME DAILY, Disp-90 tablet, R-3, E-Prescribe      aspirin 81 mg chewable tablet [ASPIRIN 81 MG CHEWABLE TABLET] Chew 81 mg daily., Historical      black cohosh 540 mg cap [BLACK COHOSH 540 MG CAP] Take by mouth. Take 1 capsule daily, Historical      calcium carbonate 600 mg-vitamin D 400 units (CALTRATE) 600-400 MG-UNIT per tablet Take 1 tablet by mouth daily, Historical      denosumab 60 mg/mL Syrg [DENOSUMAB 60 MG/ML SYRG] Inject 60 mg under the skin once., Historical      docusate sodium (COLACE) 100 MG capsule Take 1 capsule (100 mg) by mouth 2 times daily, Disp-30 capsule, R-0, Local Print      !! FOLIC ACID/MULTIVITS-MIN/LUT (CENTRUM SILVER ORAL) [FOLIC ACID/MULTIVITS-MIN/LUT (CENTRUM SILVER ORAL)] Take by mouth. womens ultra, Historical      MV,CA,MIN/FA/HERBAL NO.158 (ESTROVEN ENERGY ORAL) [MV,CA,MIN/FA/HERBAL NO.158 (ESTROVEN ENERGY ORAL)] Take by mouth., Historical      polyethylene glycol (MIRALAX) 17 GM/Dose powder Take 17 g (1 capful) by mouth daily, Disp-116 g, R-0, Local Print      simvastatin (ZOCOR) 10 MG tablet Take 1  tablet (10 mg) by mouth At Bedtime, Disp-90 tablet, R-3, E-Prescribe      !! vitamin A-vitamin C-vit E-min (OCUVITE) Tab tablet [VITAMIN A-VITAMIN C-VIT E-MIN (OCUVITE) TAB TABLET] Take 2 tablets by mouth daily., Historical      vitamin D3 (CHOLECALCIFEROL) 50 mcg (2000 units) tablet Take 1 tablet by mouth daily, Historical       !! - Potential duplicate medications found. Please discuss with provider.              =================================================================      HPI  Patient information was obtained from: , Patient    Use of : N/A      Zahra Fitzgerald is a 78 year old female with a pertinent history of HTN, HLD, diverticulosis, and osteoporosis who presents to this ED via EMS for evaluation of back pain.    The patient woke up around 0900 with right lower back/gluteal pain that radiates down her right leg. She did not have any pain last night. She was unable to walk secondary to the pain. She has had issues with constipation recently which has caused lower back and abdominal pain, but her pain today is more intense than her constipation pain. EMS administered 75 mcg fentnayl en route. She has not had any abdominal pain or new leg swelling.    She does not use any assistive ambulation devices at baseline.      --------------- MEDICAL HISTORY ---------------  PAST MEDICAL HISTORY:  Reviewed independently by me.  History reviewed. No pertinent past medical history.  Patient Active Problem List   Diagnosis     Diverticulosis     Hyperlipemia     Hypertension     Osteoporosis       PAST SURGICAL HISTORY:  Reviewed independently by me.  Past Surgical History:   Procedure Laterality Date     [facility] APPENDECTOMY      Description: Appendectomy;  Recorded: 03/12/2009;       CURRENT MEDICATIONS:    Reviewed independently by me.    Current Facility-Administered Medications:      Lidocaine (LIDOCARE) 4 % Patch 1 patch, 1 patch, Transdermal, Once, Gordo Ramon MD, 1 patch at  06/27/23 1323    Current Outpatient Medications:      atenolol (TENORMIN) 25 MG tablet, TAKE ONE TABLET BY MOUTH ONE TIME DAILY, Disp: 90 tablet, Rfl: 3     tiZANidine (ZANAFLEX) 4 MG tablet, Take 1 tablet (4 mg) by mouth 3 times daily as needed for muscle spasms, Disp: 10 tablet, Rfl: 0     aspirin 81 mg chewable tablet, [ASPIRIN 81 MG CHEWABLE TABLET] Chew 81 mg daily., Disp: , Rfl:      black cohosh 540 mg cap, [BLACK COHOSH 540 MG CAP] Take by mouth. Take 1 capsule daily, Disp: , Rfl:      calcium carbonate 600 mg-vitamin D 400 units (CALTRATE) 600-400 MG-UNIT per tablet, Take 1 tablet by mouth daily, Disp: , Rfl:      denosumab 60 mg/mL Syrg, [DENOSUMAB 60 MG/ML SYRG] Inject 60 mg under the skin once., Disp: , Rfl:      docusate sodium (COLACE) 100 MG capsule, Take 1 capsule (100 mg) by mouth 2 times daily, Disp: 30 capsule, Rfl: 0     FOLIC ACID/MULTIVITS-MIN/LUT (CENTRUM SILVER ORAL), [FOLIC ACID/MULTIVITS-MIN/LUT (CENTRUM SILVER ORAL)] Take by mouth. womens ultra, Disp: , Rfl:      MV,CA,MIN/FA/HERBAL NO.158 (ESTROVEN ENERGY ORAL), [MV,CA,MIN/FA/HERBAL NO.158 (ESTROVEN ENERGY ORAL)] Take by mouth., Disp: , Rfl:      polyethylene glycol (MIRALAX) 17 GM/Dose powder, Take 17 g (1 capful) by mouth daily, Disp: 116 g, Rfl: 0     simvastatin (ZOCOR) 10 MG tablet, Take 1 tablet (10 mg) by mouth At Bedtime, Disp: 90 tablet, Rfl: 3     vitamin A-vitamin C-vit E-min (OCUVITE) Tab tablet, [VITAMIN A-VITAMIN C-VIT E-MIN (OCUVITE) TAB TABLET] Take 2 tablets by mouth daily., Disp: , Rfl:      vitamin D3 (CHOLECALCIFEROL) 50 mcg (2000 units) tablet, Take 1 tablet by mouth daily, Disp: , Rfl:     ALLERGIES:  Reviewed independently by me.  No Known Allergies    FAMILY HISTORY:  Reviewed independently by me.  Family History   Problem Relation Age of Onset     Breast Cancer Mother         Unsure of age     Breast Cancer Maternal Aunt 65.00     Breast Cancer Maternal Aunt 65.00     Breast Cancer Sister 42.00       SOCIAL  HISTORY:   Reviewed independently by me.  Social History     Socioeconomic History     Marital status:      Spouse name: None     Number of children: None     Years of education: None     Highest education level: None   Tobacco Use     Smoking status: Never     Smokeless tobacco: Never       --------------- PHYSICAL EXAM ---------------  Nursing notes and vitals independently reviewed by me.  VITALS:  Vitals:    06/27/23 1217 06/27/23 1431   BP: (!) 150/68 131/56   Pulse: 73 71   Resp: 18    Temp: 98.1  F (36.7  C)    TempSrc: Oral    SpO2: 99% 97%   Weight: 66.2 kg (146 lb)        PHYSICAL EXAM:    General:  alert, interactive, no distress  Eyes:  conjunctivae clear, conjugate gaze  HENT:  atraumatic, nose with no rhinorrhea, oropharynx clear  Neck:  no meningismus, no c-spine tenderness with painless active ROM intact  Cardiovascular:  HR 70s during exam, regular rhythm, no murmurs, brisk cap refill  Chest:  no chest wall tenderness  Pulmonary:  no stridor, normal phonation, normal work of breathing, clear lungs bilaterally  Abdomen:  soft, nondistended, nontender; no tenderness whatsoever  :  no CVA tenderness or overlying skin changes  Back:    - no midline spinal tenderness but does have right lumbar paraspinal muscle tenderness/spasm with positive straight leg-raise on right, no overlying skin changes, 5/5 strength to all extremities with sensation to light touch intact to BLE  - pain with attempts to sit upright from lying flat in bed  Musculoskeletal:    - no pain with passive ROM of right hip  - 1+ nontender pitting edema to knees bilateral (baseline per family)  -no pretibial edema, no calf tenderness  Skin:  warm, dry, no rash  Neuro:  awake, alert, answers questions appropriately, follows commands, moves all limbs, 5/5 strength to all extremities with sensation to light touch intact  Psych:  calm, normal affect      --------------- RESULTS ---------------  RADIOLOGY:  Reviewed and independently  interpreted by me. Please see official radiology report.  Recent Results (from the past 24 hour(s))   CT Lumbar Spine w/o Contrast    Narrative    EXAM: CT LUMBAR SPINE W/O CONTRAST  LOCATION: [facility]  DATE: 6/27/2023    INDICATION: Low back pain, right sciatica.  COMPARISON: CT abdomen and pelvis 04/18/2023.  TECHNIQUE: Routine CT Lumbar Spine without IV contrast. Multiplanar reformats. Dose reduction techniques were used.     FINDINGS:  Five nonrib-bearing lumbar-type vertebral bodies. 20 degrees convex right lumbar curvature with slight rightward subluxation of L3 on L4, stable. Stable accentuation of the normal lumbar lordosis with grade 1 degenerative anterolisthesis of L4 on L5 and   L5 on S1. Moderate chronic endplate irregularity is seen along the left lateral apposing L2-L3, L3-L4 and right lateral apposing L4-L5 endplates and along the central superior endplate of S1. Small chronic Schmorl's nodes and/or endplate irregularity   superior and inferior L1 endplates. Allowing for endplate degenerative changes, vertebral body heights are grossly preserved.    Dorsal paraspinal musculature and psoas muscles are robust. Visualized aorta is normal in caliber. Scattered aortic atherosclerotic calcifications.    T12-L1: Normal disc height. Mild disc vacuum change. Trace posterior disc bulge. No facet arthropathy. No spinal canal or neural foraminal stenosis.    L1-L2: Normal disc height. Disc vacuum change. Trace posterior disc bulge. No facet arthropathy. No spinal canal or neural foraminal stenosis.    L2-L3: Mild to moderate interspace narrowing, greater on the left. Prominent far lateral left interbody spurring. Minor foraminal interbody spurring and trace posterior disc bulges. No facet arthropathy. No spinal canal or neural foraminal stenosis.    L3-L4: Moderate interspace narrowing, greater on the left. Disc vacuum change. Mildly prominent far lateral left disc osteophyte complex.  Mild left foraminal posterior interbody spurring. Minor facet arthropathy. No spinal canal stenosis. No right   foraminal stenosis. Mild to moderate up down left foraminal stenosis (sagittal image 31).    L4-L5: Grade 1 anterolisthesis of L4 on L5. Moderate posterior and right-sided interspace narrowing. Right-sided disc vacuum change. Shallow posterior disc bulge. Moderate to advanced right-sided facet arthropathy with more moderate changes on the left.   Right lateral recess narrowing without spinal canal stenosis. Mild left greater than right up down foraminal narrowing.    L5-S1: Grade 1 anterolisthesis L5 on S1. Mild posterior interspace narrowing. Disc vacuum change. Shallow posterior disc bulge. Moderate facet arthropathy. Slight up down foraminal narrowing without significant foraminal or canal stenosis.      Impression    IMPRESSION:  1.  Moderate lumbar spondylosis with convex right thoracolumbar rotary curvature. Grade 1 degenerative retrolisthesis L5 on S1 and L4 on L5, stable.    2.  Mild multilevel interbody spurring and minor disc bulging without significant spinal canal stenosis.    3.  Foraminal narrowing as described with mild bilateral L4-L5 and mild to moderate right and mild left L3-L4 neural foraminal stenosis.                 --------------- ADDITIONAL MDM ---------------  History:  - I considered systemic symptoms of the presenting illness.  - Supplemental history from:       -- see above charting, if applicable: patient, family (, son)  - External Record(s) reviewed:       -- see above charting, if applicable       -- Inpatient/outpatient record (clinic visit 6/22/22), prior labs (blood 4/18/23), prior imaging (CT 4/18/23)    Workup:  - Chart documentation above includes differential considered and any EKGs or imaging independently interpreted by provider.  - In additional to work up documented, I considered the following work up:       -- see above charting, if applicable    External  Consultation:  - Discussion of management with another provider:       -- see above charting, if applicable    Complicating Factors:  - Care impacted by chronic illness:       -- see above MDM, past medical history, & problem list  - Care affected by social determinants of health:       -- see above social history       -- Access to Affordable Healthcare    Disposition Considerations:  - Discharge       -- I considered admission to the hospital, but ultimately discharged the patient per decision making above       -- I recommended the patient continue their current prescription strength medication(s) as charted above in current medications list       -- I prescribed prescription strength medication(s) as charted above       -- I recommended over-the-counter medication(s) as charted above & in discharge instructions         I, Eugenio Dejesus, am serving as a scribe to document services personally performed by Dr. Gordo Ramon based on my observation and the provider's statements to me. I, Gordo Ramon MD attest that Eugenio Dejesus is acting in a scribe capacity, has observed my performance of the services and has documented them in accordance with my direction.      Gordo Ramon MD  06/27/23  Emergency Medicine  [facility] EMERGENCY ROOM  [address] [id number]  [phone]  Dept: [phone]     Gordo Ramon MD  06/27/23 4799

## 2023-06-27 NOTE — ED TRIAGE NOTES
Pt presents to ED via EMS. Pt lives independently at home with  and has hx of Alzheimers. Pt experienced sharp pain that radiates down the R hip that suddenly came on. Did not fall. 75 of fentanyl given in route.      Triage Assessment     Row Name 06/27/23 1219       Triage Assessment (Adult)    Airway WDL WDL       Respiratory WDL    Respiratory WDL WDL       Skin Circulation/Temperature WDL    Skin Circulation/Temperature WDL WDL       Cardiac WDL    Cardiac WDL X  HTN       Peripheral/Neurovascular WDL    Peripheral Neurovascular WDL WDL       Cognitive/Neuro/Behavioral WDL    Cognitive/Neuro/Behavioral WDL X;orientation    Orientation disoriented to;time;situation;place

## 2023-06-27 NOTE — DISCHARGE INSTRUCTIONS
As needed for pain control at home, take:  - over-the-counter ibuprofen 800mg by mouth every 8 hours (max dose 2400mg in 24 hours)  - over-the-counter acetaminophen 1g by mouth every 6 hours (max dose 4g in 24 hours)  - prescribed tizanidine for muscle spasm  - over-the-counter lidocaine patches to painful area (up to 12 hours on, then 12 hours off)  - over-the-counter Tiger Balm patches to painful area (1-2 per day)  - ice pack to painful area up to 20 minutes every 1 hour   - back exercises 30 minutes daily    The worst thing you can do is lie still in bed and not move. This will result in worsened tightening of the low back muscles and worse pain. You need to move as much as possible to help keep the muscles loose.    Follow up with your Primary Care provider in 2 days for a recheck.    Return to the Emergency Department for any inability to urinate, inability to move your legs, or any other concerns.

## 2023-06-28 ENCOUNTER — NURSE TRIAGE (OUTPATIENT)
Dept: NURSING | Facility: CLINIC | Age: 78
End: 2023-06-28
Payer: MEDICARE

## 2023-06-29 NOTE — TELEPHONE ENCOUNTER
Blas () calls and says that his wife was in the hospital yesterday for muscle spasms in her lower back. Blas says that the  Prescribed Tizanidine for pt. And also said that his wife can take Advil 800 mg po every 8 hours. Blas says that the DrLexus Said that his wife can also take Tylenol every 6 hours. Blas says that his wife was also told to put a Lidocaine Patch on and a Tiger Balm Patch on. Blas says that he is slightly confused with these medications. RN then explained those medications to Blas and Blas voiced understanding. Blas will also call a pharmacist about the 2 patches.    Reason for Disposition    [1] Follow-up call to recent contact AND [2] information only call, no triage required    Additional Information    Negative: [1] Caller is not with the adult (patient) AND [2] reporting urgent symptoms    Negative: Lab result questions    Negative: Medication questions    Negative: Caller can't be reached by phone    Negative: Caller has already spoken to PCP or another triager    Negative: RN needs further essential information from caller in order to complete triage    Negative: Requesting regular office appointment    Negative: [1] Caller requesting NON-URGENT health information AND [2] PCP's office is the best resource    Negative: Health Information question, no triage required and triager able to answer question    Negative: General information question, no triage required and triager able to answer question    Negative: Question about upcoming scheduled test, no triage required and triager able to answer question    Negative: [1] Caller is not with the adult (patient) AND [2] probable NON-URGENT symptoms    Protocols used: INFORMATION ONLY CALL - [facility]

## 2023-07-22 ENCOUNTER — HOSPITAL ENCOUNTER (OUTPATIENT)
Facility: CLINIC | Age: 78
Setting detail: OBSERVATION
Discharge: HOME | End: 2023-07-23
Attending: EMERGENCY MEDICINE | Admitting: EMERGENCY MEDICINE
Payer: MEDICARE

## 2023-07-22 ENCOUNTER — NURSE TRIAGE (OUTPATIENT)
Dept: NURSING | Facility: CLINIC | Age: 78
End: 2023-07-22
Payer: MEDICARE

## 2023-07-22 ENCOUNTER — APPOINTMENT (OUTPATIENT)
Dept: CT IMAGING | Facility: CLINIC | Age: 78
End: 2023-07-22
Attending: EMERGENCY MEDICINE
Payer: MEDICARE

## 2023-07-22 DIAGNOSIS — G91.2 NORMAL PRESSURE HYDROCEPHALUS (H): ICD-10-CM

## 2023-07-22 LAB
ALBUMIN SERPL-MCNC: 3.2 G/DL (ref 3.5–5)
ALP SERPL-CCNC: 78 U/L (ref 45–120)
ALT SERPL W P-5'-P-CCNC: 12 U/L (ref 0–45)
ANION GAP SERPL CALCULATED.3IONS-SCNC: 10 MMOL/L (ref 5–18)
AST SERPL W P-5'-P-CCNC: 20 U/L (ref 0–40)
BASOPHILS # BLD AUTO: 0 10E3/UL (ref 0–0.2)
BASOPHILS NFR BLD AUTO: 1 %
BILIRUB SERPL-MCNC: 1 MG/DL (ref 0–1)
BUN SERPL-MCNC: 15 MG/DL (ref 8–28)
C REACTIVE PROTEIN LHE: 3.8 MG/DL (ref 0–?)
CALCIUM SERPL-MCNC: 9.1 MG/DL (ref 8.5–10.5)
CHLORIDE BLD-SCNC: 107 MMOL/L (ref 98–107)
CO2 SERPL-SCNC: 24 MMOL/L (ref 22–31)
CREAT SERPL-MCNC: 0.89 MG/DL (ref 0.6–1.1)
EOSINOPHIL # BLD AUTO: 0.2 10E3/UL (ref 0–0.7)
EOSINOPHIL NFR BLD AUTO: 2 %
ERYTHROCYTE [DISTWIDTH] IN BLOOD BY AUTOMATED COUNT: 12.9 % (ref 10–15)
GFR SERPL CREATININE-BSD FRML MDRD: 66 ML/MIN/1.73M2
GLUCOSE BLD-MCNC: 98 MG/DL (ref 70–125)
HCT VFR BLD AUTO: 36.5 % (ref 35–47)
HGB BLD-MCNC: 12 G/DL (ref 11.7–15.7)
IMM GRANULOCYTES # BLD: 0 10E3/UL
IMM GRANULOCYTES NFR BLD: 0 %
LYMPHOCYTES # BLD AUTO: 1.2 10E3/UL (ref 0.8–5.3)
LYMPHOCYTES NFR BLD AUTO: 18 %
MAGNESIUM SERPL-MCNC: 1.8 MG/DL (ref 1.8–2.6)
MCH RBC QN AUTO: 31.6 PG (ref 26.5–33)
MCHC RBC AUTO-ENTMCNC: 32.9 G/DL (ref 31.5–36.5)
MCV RBC AUTO: 96 FL (ref 78–100)
MONOCYTES # BLD AUTO: 0.7 10E3/UL (ref 0–1.3)
MONOCYTES NFR BLD AUTO: 11 %
NEUTROPHILS # BLD AUTO: 4.6 10E3/UL (ref 1.6–8.3)
NEUTROPHILS NFR BLD AUTO: 68 %
NRBC # BLD AUTO: 0 10E3/UL
NRBC BLD AUTO-RTO: 0 /100
PLATELET # BLD AUTO: 182 10E3/UL (ref 150–450)
POTASSIUM BLD-SCNC: 3.7 MMOL/L (ref 3.5–5)
PROT SERPL-MCNC: 6.3 G/DL (ref 6–8)
RBC # BLD AUTO: 3.8 10E6/UL (ref 3.8–5.2)
SODIUM SERPL-SCNC: 141 MMOL/L (ref 136–145)
WBC # BLD AUTO: 6.7 10E3/UL (ref 4–11)

## 2023-07-22 PROCEDURE — 85018 HEMOGLOBIN: CPT | Performed by: EMERGENCY MEDICINE

## 2023-07-22 PROCEDURE — 80053 COMPREHEN METABOLIC PANEL: CPT | Performed by: EMERGENCY MEDICINE

## 2023-07-22 PROCEDURE — 93005 ELECTROCARDIOGRAM TRACING: CPT | Performed by: EMERGENCY MEDICINE

## 2023-07-22 PROCEDURE — 70450 CT HEAD/BRAIN W/O DYE: CPT | Mod: MA

## 2023-07-22 PROCEDURE — 99285 EMERGENCY DEPT VISIT HI MDM: CPT | Mod: 25

## 2023-07-22 PROCEDURE — 83735 ASSAY OF MAGNESIUM: CPT | Performed by: EMERGENCY MEDICINE

## 2023-07-22 PROCEDURE — 258N000003 HC RX IP 258 OP 636: Performed by: EMERGENCY MEDICINE

## 2023-07-22 PROCEDURE — 86140 C-REACTIVE PROTEIN: CPT | Performed by: EMERGENCY MEDICINE

## 2023-07-22 PROCEDURE — 96360 HYDRATION IV INFUSION INIT: CPT

## 2023-07-22 PROCEDURE — 36415 COLL VENOUS BLD VENIPUNCTURE: CPT | Performed by: EMERGENCY MEDICINE

## 2023-07-22 RX ADMIN — SODIUM CHLORIDE 500 ML: 9 INJECTION, SOLUTION INTRAVENOUS at 23:18

## 2023-07-22 ASSESSMENT — ACTIVITIES OF DAILY LIVING (ADL): ADLS_ACUITY_SCORE: 35

## 2023-07-23 ENCOUNTER — APPOINTMENT (OUTPATIENT)
Dept: OCCUPATIONAL THERAPY | Facility: CLINIC | Age: 78
End: 2023-07-23
Attending: HOSPITALIST
Payer: MEDICARE

## 2023-07-23 ENCOUNTER — TELEPHONE (OUTPATIENT)
Dept: NEUROLOGY | Facility: CLINIC | Age: 78
End: 2023-07-23
Payer: MEDICARE

## 2023-07-23 ENCOUNTER — APPOINTMENT (OUTPATIENT)
Dept: PHYSICAL THERAPY | Facility: CLINIC | Age: 78
End: 2023-07-23
Attending: HOSPITALIST
Payer: MEDICARE

## 2023-07-23 VITALS
TEMPERATURE: 98 F | DIASTOLIC BLOOD PRESSURE: 59 MMHG | HEIGHT: 60 IN | OXYGEN SATURATION: 96 % | SYSTOLIC BLOOD PRESSURE: 126 MMHG | HEART RATE: 88 BPM | WEIGHT: 146 LBS | BODY MASS INDEX: 28.66 KG/M2 | RESPIRATION RATE: 18 BRPM

## 2023-07-23 PROBLEM — G91.2 NORMAL PRESSURE HYDROCEPHALUS (H): Status: ACTIVE | Noted: 2023-07-23

## 2023-07-23 LAB
ALBUMIN UR-MCNC: NEGATIVE MG/DL
APPEARANCE UR: CLEAR
BILIRUB UR QL STRIP: NEGATIVE
COLOR UR AUTO: ABNORMAL
GLUCOSE UR STRIP-MCNC: NEGATIVE MG/DL
HGB UR QL STRIP: NEGATIVE
HYALINE CASTS: 1 /LPF
KETONES UR STRIP-MCNC: 40 MG/DL
LEUKOCYTE ESTERASE UR QL STRIP: NEGATIVE
MUCOUS THREADS #/AREA URNS LPF: PRESENT /LPF
NITRATE UR QL: NEGATIVE
PH UR STRIP: 6.5 [PH] (ref 5–7)
RBC URINE: 4 /HPF
SP GR UR STRIP: 1.02 (ref 1–1.03)
SQUAMOUS EPITHELIAL: 1 /HPF
UROBILINOGEN UR STRIP-MCNC: <2 MG/DL
WBC URINE: 2 /HPF

## 2023-07-23 PROCEDURE — 97165 OT EVAL LOW COMPLEX 30 MIN: CPT | Mod: GO

## 2023-07-23 PROCEDURE — 99223 1ST HOSP IP/OBS HIGH 75: CPT | Mod: AI | Performed by: HOSPITALIST

## 2023-07-23 PROCEDURE — G0378 HOSPITAL OBSERVATION PER HR: HCPCS

## 2023-07-23 PROCEDURE — 97162 PT EVAL MOD COMPLEX 30 MIN: CPT | Mod: GP

## 2023-07-23 PROCEDURE — 99207 PR APP CREDIT; MD BILLING SHARED VISIT: CPT | Performed by: EMERGENCY MEDICINE

## 2023-07-23 PROCEDURE — 96361 HYDRATE IV INFUSION ADD-ON: CPT

## 2023-07-23 PROCEDURE — 258N000003 HC RX IP 258 OP 636: Performed by: HOSPITALIST

## 2023-07-23 PROCEDURE — 81001 URINALYSIS AUTO W/SCOPE: CPT | Performed by: EMERGENCY MEDICINE

## 2023-07-23 PROCEDURE — 97116 GAIT TRAINING THERAPY: CPT | Mod: GP

## 2023-07-23 PROCEDURE — 97535 SELF CARE MNGMENT TRAINING: CPT | Mod: GO

## 2023-07-23 RX ORDER — LANOLIN ALCOHOL/MO/W.PET/CERES
3 CREAM (GRAM) TOPICAL
Status: DISCONTINUED | OUTPATIENT
Start: 2023-07-23 | End: 2023-07-23 | Stop reason: HOSPADM

## 2023-07-23 RX ORDER — LIDOCAINE 40 MG/G
CREAM TOPICAL
Status: DISCONTINUED | OUTPATIENT
Start: 2023-07-23 | End: 2023-07-23 | Stop reason: HOSPADM

## 2023-07-23 RX ORDER — PROCHLORPERAZINE MALEATE 5 MG
5 TABLET ORAL EVERY 6 HOURS PRN
Status: DISCONTINUED | OUTPATIENT
Start: 2023-07-23 | End: 2023-07-23 | Stop reason: HOSPADM

## 2023-07-23 RX ORDER — ONDANSETRON 2 MG/ML
4 INJECTION INTRAMUSCULAR; INTRAVENOUS EVERY 6 HOURS PRN
Status: DISCONTINUED | OUTPATIENT
Start: 2023-07-23 | End: 2023-07-23 | Stop reason: HOSPADM

## 2023-07-23 RX ORDER — DOCUSATE SODIUM 100 MG/1
100 CAPSULE, LIQUID FILLED ORAL 2 TIMES DAILY PRN
Status: DISCONTINUED | OUTPATIENT
Start: 2023-07-23 | End: 2023-07-23 | Stop reason: HOSPADM

## 2023-07-23 RX ORDER — ONDANSETRON 4 MG/1
4 TABLET, ORALLY DISINTEGRATING ORAL EVERY 6 HOURS PRN
Status: DISCONTINUED | OUTPATIENT
Start: 2023-07-23 | End: 2023-07-23 | Stop reason: HOSPADM

## 2023-07-23 RX ORDER — SODIUM CHLORIDE, SODIUM LACTATE, POTASSIUM CHLORIDE, CALCIUM CHLORIDE 600; 310; 30; 20 MG/100ML; MG/100ML; MG/100ML; MG/100ML
INJECTION, SOLUTION INTRAVENOUS CONTINUOUS
Status: DISCONTINUED | OUTPATIENT
Start: 2023-07-23 | End: 2023-07-23 | Stop reason: HOSPADM

## 2023-07-23 RX ORDER — PROCHLORPERAZINE 25 MG
12.5 SUPPOSITORY, RECTAL RECTAL EVERY 12 HOURS PRN
Status: DISCONTINUED | OUTPATIENT
Start: 2023-07-23 | End: 2023-07-23 | Stop reason: HOSPADM

## 2023-07-23 RX ADMIN — SODIUM CHLORIDE, POTASSIUM CHLORIDE, SODIUM LACTATE AND CALCIUM CHLORIDE: 600; 310; 30; 20 INJECTION, SOLUTION INTRAVENOUS at 08:35

## 2023-07-23 ASSESSMENT — ACTIVITIES OF DAILY LIVING (ADL)
DEPENDENT_IADLS:: CLEANING;COOKING;LAUNDRY;SHOPPING;MEAL PREPARATION;MEDICATION MANAGEMENT;MONEY MANAGEMENT;TRANSPORTATION
ADLS_ACUITY_SCORE: 35
ADLS_ACUITY_SCORE: 41
ADLS_ACUITY_SCORE: 35
IADL_COMMENTS: FAMILY ASSIST

## 2023-07-23 NOTE — TELEPHONE ENCOUNTER
Called by [facility] ED about this patient.  The question is if she needs transfer for NPH work-up.  Presented with episode after arguing with her  of not responding.  Provider who evaluated patient and think it was behavioral.  Now appears at her baseline per report.  Head CT did show ventriculomegaly.  She also has known history of dementia and gait dysfunction.  Dementia dates back to at least January of this year and notes.  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  There is a relatively high tight vertex in the hyperacute callosal angle.  However NPH work-up is best done in the outpatient setting and would not recommend transfer for this.  Would recommend referral to neurosurgery and neurology for consideration of outpatient NPH work-up.  If further questions for neurology or neurologic changes instructed to call or page.      Janel Pedro, DO

## 2023-07-23 NOTE — CONSULTS
Care Management Initial Consult    General Information  Assessment completed with: Patient, Family, son Álvaro at bedside and  Blas via phone  Type of CM/SW Visit: Initial Assessment (notice of change from IP to OP- MOON/observation notice)    Primary Care Provider verified and updated as needed: Yes   Readmission within the last 30 days: no previous admission in last 30 days         Advance Care Planning: Advance Care Planning Reviewed:  (no HCD on file, son Álvaro states there is a HCD at home in the safe and they will bring it in)        Communication Assessment  Patient's communication style: spoken language (English or Bilingual)          Cognitive  Cognitive/Neuro/Behavioral: awake, unable to provide full history, hx of dementia but  to baseline today per son vs prior to coming to ER     Living Environment:   People in home: spouse, child(hector), adult   Blas and son Álvaro  Current living Arrangements: house      Able to return to prior arrangements: yes       Family/Social Support:  Care provided by: self, child(hector) ( or son assist at home)  Provides care for: no one, no one, unable/limited ability to care for self  Marital Status:   , Children (2 sons- Álvaro and Khris)  Blas       Description of Support System: Supportive, Involved         Current Resources:   Patient receiving home care services: No  Community Resources: None  Equipment/supplies currently used at home: Incontinence Supplies (Rx glasses, shower chair)    Employment/Financial:  Employment Status: retired     Employment/ Comments: no  or [employer]  Financial Concerns: No concerns identified   Referral to Financial Worker: No     Does the patient's insurance plan have a 3 day qualifying hospital stay waiver?  Yes   Will the waiver be used for post-acute placement? No- family confirm plan is for patient to return home with assist from family    Lifestyle & Psychosocial Needs:  Social Determinants of  "Health     Tobacco Use: Low Risk  (6/27/2023)    Patient History      Smoking Tobacco Use: Never      Smokeless Tobacco Use: Never      Passive Exposure: Not on file   Alcohol Use: Not on file   Financial Resource Strain: Not on file   Food Insecurity: Not on file   Transportation Needs: Not on file   Physical Activity: Not on file   Stress: Not on file   Social Connections: Not on file   Intimate Partner Violence: Not on file   Depression: Not at risk (6/22/2022)    PHQ-2      PHQ-2 Score: 0   Housing Stability: Not on file       Functional Status:  Prior to admission patient needed assistance:   Dependent ADLs:: Independent, Incontinence, Dressing, Bathing ( or son help as needed)  Dependent IADLs:: Cleaning, Cooking, Laundry, Shopping, Meal Preparation, Medication Management, Money Management, Transportation ( or son help)  Assesssment of Functional Status: At functional baseline    Mental Health Status:  Mental Health Status:  (hx of dementia but denies MH concerns)       Chemical Dependency Status:  Chemical Dependency Status:  (denies concerns)             Values/Beliefs:  Spiritual, Cultural Beliefs, Roman Catholic Practices, Values that affect care: no          Values/Beliefs Comment: \"Anglican\"    Additional Information:  CM alerted by UR of change in status from IP to OP (observation).   Chart reviewed.     CM provided MOON/observation notice and education to patient,  Blas (via phone) and son Álvaro (at bedside). Has Medicare and [facility]. Patient signed but unable to confirm if she understood.  and son verbalized understanding.     Patient lives in private home with her  Blas and son Álvaro who assist as needed. Independent with ambulation. Family does the housework, cooking, medications and transportation.  handles the financials. No private duty or skilled HC services. PCP confirmed.     Son Khris will be coming to hospital to visit and will bring copy of HCD.     One of the " son's will transport home at hospital discharge. At this time family is denying need for additional services at home.     3:04 PM  Care Management Discharge Note    Discharge Date: 07/23/2023       Discharge Disposition: Home    Discharge Services: None    Discharge DME: None    Discharge Transportation: family or friend will provide    Private pay costs discussed: None indicated.    Does the patient's insurance plan have a 3 day qualifying hospital stay waiver?  Yes   Will the waiver be used for post-acute placement? No- will return home with family. TCU not indicated.     PAS Confirmation Code:  N/A  Patient/family educated on Medicare website which has current facility and service quality ratings:  (None indicated at this time)    Education Provided on the Discharge Plan: Yes  Persons Notified of Discharge Plans: patient and family ( Blas, sons Álvaro and Khris)  Patient/Family in Agreement with the Plan: yes    Handoff Referral Completed: CM coordinated with MD, bedside RN and Charge RN. CM scanned HCD to [facility]. No further CM needs. Discharge home with family per bedside RN.     Additional Information:  Son Khris called to CM to confirm he is at the hospital with copy of HCD.  CM up to room to get copy of HCD and scanned to [facility]; also placed copy in paper chart.   MD in room and confirms plan for discharge; orders placed. Patient/family decline need for HC services. PT and OT evaluated and recommendations for home with assist- family able to assist.   Son will transport home.    [facility] referral sent per protocol.     Maribel Garrido, RN
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
## 2023-07-23 NOTE — PROGRESS NOTES
Pt alert and pleasant. Disoriented to place, time. No c/o pain. Able to make needs known verbally, seemed to be when prompted. Needs reinforcement education on call light. Still had not eating since yesterrday, but was looking forward to breakfast.   Rambles and unable to tell writer where she is from or other simple questions with clairty. Able to follow simple commands. Tele NSR.

## 2023-07-23 NOTE — PROGRESS NOTES
Patient will discharge home today with family. Family reports that mentation has returned to baseline. Will discharge home into family's care.
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
## 2023-07-23 NOTE — PROGRESS NOTES
Occupational Therapy     07/23/23 1000   Appointment Info   Signing Clinician's Name / Credentials (OT) Isabelle Perez OT   Living Environment   People in Home spouse;child(hector), adult  (Adult son)   Current Living Arrangements house   Home Accessibility no concerns   Living Environment Comments Pt lives on main level; RTS with vanity nearby; Tub/shower with shower chair   Self-Care   Usual Activity Tolerance good   Current Activity Tolerance good   Equipment Currently Used at Home shower chair;raised toilet seat   Fall history within last six months no   Activity/Exercise/Self-Care Comment Ind toileting and g/h; has needed assist on occassion with LB dressing; son assists with bathing   Instrumental Activities of Daily Living (IADL)   IADL Comments family assist   General Information   Onset of Illness/Injury or Date of Surgery 07/22/23   Referring Physician Rafaela Hernandez MD   Patient/Family Therapy Goal Statement (OT) get stronger   Additional Occupational Profile Info/Pertinent History of Current Problem Zahra Fitzgerald is a 78 year old female who presented with family concerns regarding not communicating, not eating or drinking in 24 hours. Medical history is notable for hypertension, hyperlipidemia, dementia.   Cognitive Status Examination   Orientation Status person   Affect/Mental Status (Cognitive) other (see comments)  (dementia at baseline)   Follows Commands follows one-step commands;50-74% accuracy;increased processing time needed;physical/tactile prompts required;repetition of directions required;verbal cues/prompting required   Range of Motion Comprehensive   General Range of Motion no range of motion deficits identified  (BUE)   Strength Comprehensive (MMT)   General Manual Muscle Testing (MMT) Assessment no strength deficits identified  (BUE)   Transfers   Transfers sit-stand transfer   Sit-Stand Transfer   Sit-Stand Dickson (Transfers) contact guard   Assistive Device (Sit-Stand  Transfers) walker, front-wheeled   Activities of Daily Living   BADL Assessment/Intervention lower body dressing;toileting   Lower Body Dressing Assessment/Training   [place] Level (Lower Body Dressing) minimum assist (75% patient effort)   Toileting   [place] Level (Toileting) minimum assist (75% patient effort)   Clinical Impression   Criteria for Skilled Therapeutic Interventions Met (OT) Yes, treatment indicated   OT Diagnosis Decreased ADL independence   OT Problem List-Impairments impacting ADL problems related to;cognition;mobility   Assessment of Occupational Performance 1-3 Performance Deficits   Identified Performance Deficits transfers, toileting   Planned Therapy Interventions (OT) ADL retraining;transfer training   Clinical Decision Making Complexity (OT) low complexity   Anticipated Equipment Needs Upon Discharge (OT) other (see comments)  (grab bar, shower)   Risk & Benefits of therapy have been explained evaluation/treatment results reviewed;care plan/treatment goals reviewed;patient;son   OT Total Evaluation Time   OT Eval, Low Complexity Minutes (32460) 10   OT Goals   Therapy Frequency (OT) Daily   OT Predicted Duration/Target Date for Goal Attainment 07/26/23   OT Goals Toilet Transfer/Toileting;Lower Body Dressing   OT: Lower Body Dressing Supervision/stand-by assist   OT: Toilet Transfer/Toileting Supervision/stand-by assist   Interventions   Interventions Quick Adds Self-Care/Home Management   Self-Care/Home Management   Self-Care/Home Mgmt/ADL, Compensatory, Meal Prep Minutes (65547) 23   Symptoms Noted During/After Treatment (Meal Preparation/Planning Training) none   Treatment Detail/Skilled Intervention LB dressing with Min A; verbal cues and step by step directions needed for task. Sit<>stand transfers at chair/toilet with FWW; CGA- verbal and tactile cues for safe transfer technique and correct hand placement. Fxl mob to/from bathroom with FWW, CGA- verbal cues for device  advancement/direction/attention to task. Toileting task, Min A for clothing management, verbal cues for sequencing. G/H at sink, SBA; verbal cues. Pt returned to chair at end of session and setup with all needs within reach.   OT Discharge Planning   OT Plan FWW safety with transfers and fxl mob; g/h; toileting; LB dressing   OT Discharge Recommendation (DC Rec) home with assist   OT Rationale for DC Rec Pt has supportive family who assist with all ADLs as needed; pt completes ADLs in session with SBA/Min A.   OT Brief overview of current status CGA fxl mob; SBA/Min A ADLs   Total Session Time   Timed Code Treatment Minutes 23   Total Session Time (sum of timed and untimed services) 33     Isabelle Perez, OT 7/23/2023

## 2023-07-23 NOTE — H&P
[facility] MEDICINE ADMISSION HISTORY AND PHYSICAL     Brief Synopsis:     Zahra Fitzgerald is a 78 year old female who presented with family concerns regarding not communicating, not eating or drinking in 24 hours.    Medical history is notable for hypertension, hyperlipidemia, dementia.    Initial evaluation revealed slight hypertension, otherwise normal vital signs.  Basic metabolic panel and LFTs were unremarkable.  CRP slightly elevated at 3.8.  CBC normal.  CT head with ventriculomegaly.    Initial treatment included IV fluids.    Assessment and Plan:  Unresponsiveness  Dementia  Ventriculomegaly  Triage note is suggestive of this being behavioral  Triage note indicates this occurred after an argument with   Not eating or drinking in the last 24 hours  CT head with ventriculomegaly  Neurology suggests work-up for NPH can be done as an outpatient  We will check UA, denies symptoms but history unreliable  PT and OT eval's  Hopefully home later today if remains at baseline    Essential hypertension  Home meds include atenolol  Med rec pending      Clinically Significant Risk Factors Present on Admission              # Hypoalbuminemia: Lowest albumin = 3.2 g/dL at 7/22/2023 11:18 PM, will monitor as appropriate   # Drug Induced Platelet Defect: home medication list includes an antiplatelet medication   # Hypertension: Noted on problem list      # Overweight: Estimated body mass index is 28.51 kg/m  as calculated from the following:    Height as of this encounter: 1.524 m (5').    Weight as of this encounter: 66.2 kg (146 lb).              DVTP: Mechanical Prophylaxis/ Sequential Compression Devices  Code Status: Full Code  Disposition: Observation   Diet: Regular  Fluids: LR at 75    Disposition Plan      Expected Discharge Date: 07/25/2023               Chief Complaint  unresponsiveness     HISTORY   Zahra Fitzgerald is a 78 year old female who presented with family  concerns of unresponsiveness.    Per ED provider:  Zahra Fitzgerald is a 78 year old female with a past medical history of dementia, hypertension, and hyperlipidemia who presents with altered mental status.     HPI limited due to unresponsiveness     Per son, the patient has been completely unresponsive since this afternoon, has not spoken since this morning, and has not eaten or drank anything since yesterday. She did not respond to a sternal rub by EMS upon arrival. She was cleaned and changed this morning by her  but has not been willing to eat or drink. She was sitting at the edge of her bed this afternoon and looked like she was trying to walk but was not able to take a step. She did not speak during any of this. Upon arrival here she is still unresponsive and does not interact during the exam. Vital signs have been stable. Of note, she had sciatic pains a few weeks ago and was acting normal at that time. She is not taking pain medications for that anymore.      She has not had a fever.    At the time my encounter she is alert, interactive, appears comfortable and content.  She denies any chest pain, shortness of breath, abdominal pain, nausea.  Denies dysuria, edema.  She does not remember why she is in the hospital, does not recall the events leading to her visit.  I asked her if she is able to walk and she said that she is if she wants to.  Past Medical History     No past medical history on file.     Surgical History     Past Surgical History:   Procedure Laterality Date     [facility] APPENDECTOMY      Description: Appendectomy;  Recorded: 03/12/2009;     Family History      Family History   Problem Relation Age of Onset     Breast Cancer Mother         Unsure of age     Breast Cancer Maternal Aunt 65.00     Breast Cancer Maternal Aunt 65.00     Breast Cancer Sister 42.00      Social History      Social History     Tobacco Use     Smoking status: Never     Smokeless tobacco: Never      Allergies   No  Known Allergies  Prior to Admission Medications      Prior to Admission Medications   Prescriptions Last Dose Informant Patient Reported? Taking?   FOLIC ACID/MULTIVITS-MIN/LUT (CENTRUM SILVER ORAL)   Yes No   Sig: [FOLIC ACID/MULTIVITS-MIN/LUT (CENTRUM SILVER ORAL)] Take by mouth. womens ultra   MV,CA,MIN/FA/HERBAL NO.158 (ESTROVEN ENERGY ORAL)   Yes No   Sig: [MV,CA,MIN/FA/HERBAL NO.158 (ESTROVEN ENERGY ORAL)] Take by mouth.   aspirin 81 mg chewable tablet   Yes No   Sig: [ASPIRIN 81 MG CHEWABLE TABLET] Chew 81 mg daily.   atenolol (TENORMIN) 25 MG tablet   No No   Sig: TAKE ONE TABLET BY MOUTH ONE TIME DAILY   black cohosh 540 mg cap   Yes No   Sig: [BLACK COHOSH 540 MG CAP] Take by mouth. Take 1 capsule daily   calcium carbonate 600 mg-vitamin D 400 units (CALTRATE) 600-400 MG-UNIT per tablet   Yes No   Sig: Take 1 tablet by mouth daily   denosumab 60 mg/mL Syrg   Yes No   Sig: [DENOSUMAB 60 MG/ML SYRG] Inject 60 mg under the skin once.   docusate sodium (COLACE) 100 MG capsule   No No   Sig: Take 1 capsule (100 mg) by mouth 2 times daily   polyethylene glycol (MIRALAX) 17 GM/Dose powder   No No   Sig: Take 17 g (1 capful) by mouth daily   simvastatin (ZOCOR) 10 MG tablet   No No   Sig: Take 1 tablet (10 mg) by mouth At Bedtime   tiZANidine (ZANAFLEX) 4 MG tablet   No No   Sig: Take 1 tablet (4 mg) by mouth 3 times daily as needed for muscle spasms   vitamin A-vitamin C-vit E-min (OCUVITE) Tab tablet   Yes No   Sig: [VITAMIN A-VITAMIN C-VIT E-MIN (OCUVITE) TAB TABLET] Take 2 tablets by mouth daily.   vitamin D3 (CHOLECALCIFEROL) 50 mcg (2000 units) tablet   Yes No   Sig: Take 1 tablet by mouth daily      Facility-Administered Medications: None      Review of Systems     A 12 point comprehensive review of systems was negative except as noted above in HPI.    PHYSICAL EXAMINATION     Vitals      Temp:  [98.1  F (36.7  C)-98.2  F (36.8  C)] 98.1  F (36.7  C)  Pulse:  [75-84] 84  Resp:  [16-28] 16  BP:  (119-187)/(56-75) 150/68  SpO2:  [94 %-98 %] 96 %    Examination   Physical Exam:    Gen: no acute distress, comfortable, alert, interactive, pleasant, quite forgetful  ENT: no scleral icterus  Pulm: lungs are clear without wheezing, crackles, breathing comfortably at rest  CV: regular rate and rhythm, mild bilateral lower extremity pitting edema  GI: abdomen is soft, non-tender, non-distended with active bowel sounds.  MSK: no obvious deformities of the extremities  Derm: Not pale, no jaundice  Psych: Pleasantly confused      Pertinent Radiology     Radiology Results:   Recent Results (from the past 24 hour(s))   CT Head w/o Contrast    Narrative    EXAM: CT HEAD W/O CONTRAST  LOCATION: [facility]  DATE: 7/23/2023    INDICATION:  Altered mental status, not interacting with family today, hasn't eaten or drank, no history of trauma.  COMPARISON: None.  TECHNIQUE: Routine CT Head without IV contrast. Multiplanar reformats. Dose reduction techniques were used.    FINDINGS:  INTRACRANIAL CONTENTS: No intracranial hemorrhage, extraaxial collection, or mass effect.  No CT evidence of acute infarct. Mild presumed chronic small vessel ischemic changes. Narrowed callososeptal angle. Ventriculomegaly disproportionate to the degree   of volume loss. Correlate for normal pressure hydrocephalus.     VISUALIZED ORBITS/SINUSES/MASTOIDS: No intraorbital abnormality. Mild mucosal thickening scattered about the paranasal sinuses. No middle ear or mastoid effusion.    BONES/SOFT TISSUES: No acute abnormality.      Impression    IMPRESSION:  1.  No acute intracranial process.  2.  Ventriculomegaly disproportionate to the degree of volume loss. Correlate for normal pressure hydrocephalus.     EKG Results: personally reviewed. Sinus rhythm without ischemic changes.    Blas Lainez, DO  [facility] Medicine  [facility]   Phone: #[phone]

## 2023-07-23 NOTE — DISCHARGE SUMMARY
"[facility] MEDICINE  DISCHARGE SUMMARY     Primary Care Physician: Kushal Motley  Admission Date: 7/22/2023   Discharge Provider: Rafaela Hernandez MD Discharge Date: 7/23/2023   Diet:   Active Diet and Nourishment Order   Procedures     Combination Diet Regular Diet Adult     Diet       Code Status: Full Code   Activity: as tolerated        Condition at Discharge: Stable     REASON FOR PRESENTATION(See Admission Note for Details)    decreased responsiveness    PRINCIPAL & ACTIVE DISCHARGE DIAGNOSES     1.  Dehydration  2. Dementia  3.  NPH  4.  HTN  5.  Acute metabolic encephalopathy due to dehydration, dementia        PENDING LABS     Unresulted Labs Ordered in the Past 30 Days of this Admission     No orders found for last 31 day(s).          RECOMMENDATIONS TO OUTPATIENT PROVIDER FOR F/U VISIT     Follow-up Appointments     Follow-up and recommended labs and tests       Follow up primary care            DISPOSITION     Home    SUMMARY OF HOSPITAL COURSE:      78 year old female into [facility] on 7/22/2023 after presenting with decreased  Responsiveness.  History is per her sons who state she is normally at home with   who has been recently ill with upper viral infection.  2 days ago she was  Less interactive and responsive. On the day of admission to the ER she was in bed  All day.  She did not \"wake\" to a sternal rub.  Per further interviews in the ER she  Was improving then returned to her baseline.    Work up in the ER included lab analysis that was relevant for small amount of ketones  In the urine.  Vitals were stable. She had a normal white count and basic metabolic  Panel.  She was afebrile. She was given a 1 liter normal saline fluid bolus.   CT head without contrast showed ventriculomegaly consistent  With high suspicious for NPH.  Per a telephone call with the neurology team this can  Be worked up as an outpatient. She was admitted here for further " observation.    On my interview with the sons at the bedside she has clear dementia. She is sitting in   The chair feeding herself without issue. She needs assistance to the bathroom though  Uses the walker almost independently.  She laughs inappropriately and remembers  Some but not all of conversation.  She is a vulnerable adult.    I spoke at length with the sons. They say  is capable to care for her and does  Go to the appointments.  I shared information regarding possibility she has  NPH.  At times treatments are pursued.  I recommended close clinical follow up  With primary care for initial neurology referral and eventually possibly a neurosurgery  Referral.  They understand.  She is safe for discharge.      Discharge Medications with Med changes:     Current Discharge Medication List      CONTINUE these medications which have NOT CHANGED    Details   aspirin 81 mg chewable tablet [ASPIRIN 81 MG CHEWABLE TABLET] Chew 81 mg daily.      atenolol (TENORMIN) 25 MG tablet TAKE ONE TABLET BY MOUTH ONE TIME DAILY  Qty: 90 tablet, Refills: 3    Associated Diagnoses: Essential hypertension      black cohosh 540 mg cap Take 1 capsule by mouth daily      calcium carbonate 600 mg-vitamin D 400 units (CALTRATE) 600-400 MG-UNIT per tablet Take 1 tablet by mouth daily      !! FOLIC ACID/MULTIVITS-MIN/LUT (CENTRUM SILVER ORAL) Take 1 tablet by mouth daily      MV,CA,MIN/FA/HERBAL NO.158 (ESTROVEN ENERGY ORAL) Take 1 tablet by mouth daily      simvastatin (ZOCOR) 10 MG tablet Take 1 tablet (10 mg) by mouth At Bedtime  Qty: 90 tablet, Refills: 3    Associated Diagnoses: Hyperlipemia      tiZANidine (ZANAFLEX) 4 MG tablet Take 1 tablet (4 mg) by mouth 3 times daily as needed for muscle spasms  Qty: 10 tablet, Refills: 0      !! vitamin A-vitamin C-vit E-min (OCUVITE) Tab tablet [VITAMIN A-VITAMIN C-VIT E-MIN (OCUVITE) TAB TABLET] Take 2 tablets by mouth daily.      vitamin D3 (CHOLECALCIFEROL) 50 mcg (2000 units) tablet  Take 1 tablet by mouth daily       !! - Potential duplicate medications found. Please discuss with provider.                Consults       OCCUPATIONAL THERAPY ADULT IP CONSULT  PHYSICAL THERAPY ADULT IP CONSULT  CARE MANAGEMENT / SOCIAL WORK IP CONSULT    Immunizations given this encounter     Most Recent Immunizations   Administered Date(s) Administered     COVID-19 MONOVALENT 12+ (Pfizer) 03/12/2021     COVID-19 Monovalent 12+ (Pfizer 2022) 02/15/2022     DT (PEDS <7y) 11/28/2000     FLU 6-35 months 10/01/2013     Flu, Unspecified 09/17/2014     HepA, Unspecified 09/16/2009     Influenza Vaccine, 6+MO IM (QUADRIVALENT W/PRESERVATIVES) 09/15/2015     Pneumo Conj 13-V (2010&after) 04/20/2015     Pneumococcal 23 valent 05/25/2011     TDAP (Adacel,Boostrix) 10/18/2019     Td,adult,historic,unspecified 12/27/2007     Zoster vaccine, live 12/27/2007           Anticoagulation Information      Recent INR results: No results for input(s): INR in the last 168 hours.  Warfarin doses (if applicable) or name of other anticoagulant:       SIGNIFICANT IMAGING FINDINGS     Results for orders placed or performed during the hospital encounter of 07/22/23   CT Head w/o Contrast    Impression    IMPRESSION:  1.  No acute intracranial process.  2.  Ventriculomegaly disproportionate to the degree of volume loss. Correlate for normal pressure hydrocephalus.       SIGNIFICANT LABORATORY FINDINGS     Most Recent 3 BMP's:Recent Labs   Lab Test 07/22/23  2318 04/18/23  0135 01/08/23  1353    140 142   POTASSIUM 3.7 4.0 4.5   CHLORIDE 107 105 106   CO2 24 27 24   BUN 15 27 18   CR 0.89 0.92 0.79   ANIONGAP 10 8 12   GUILLE 9.1 9.5 9.0   GLC 98 95 98           Discharge Orders        Reason for your hospital stay    Confusion     Follow-up and recommended labs and tests     Follow up primary care     Activity    As tolerated     Diet    regular       Examination   Physical Exam   Temp:  [98  F (36.7  C)-98.2  F (36.8  C)] 98  F (36.7   C)  Pulse:  [75-88] 88  Resp:  [16-28] 18  BP: (119-187)/(56-75) 126/59  SpO2:  [94 %-98 %] 96 %  Wt Readings from Last 1 Encounters:   07/22/23 66.2 kg (146 lb)       General Appearance: Up in chair, feeding herself  Respiratory: clear bilaterally  Cardiovascular: regular  GI: soft  Skin: no rashes      Please see EMR for more detailed significant labs, imaging, consultant notes etc.    IRafaela MD, personally saw the patient today and spent less than or equal to 30 minutes discharging this patient.    Rafaela Hernandez MD  [facility]    CC:Kushal Motley

## 2023-07-23 NOTE — PROGRESS NOTES
[facility]      OUTPATIENT PHYSICAL THERAPY EVALUATION  PLAN OF TREATMENT FOR OUTPATIENT REHABILITATION  (COMPLETE FOR INITIAL CLAIMS ONLY)  Patient's Last Name, First Name, M.I.  YOB: 1945  Zahra Fitzgerald                        Provider's Name  [facility] Medical Record No.  [id number]                               Onset Date:  07/22/23   Start of Care Date:  (P) 07/23/23      Type:     _X_PT   ___OT   ___SLP Medical Diagnosis:  (P) weakness                        PT Diagnosis:  impaired functional mobility   Visits from SOC:  1   _________________________________________________________________________________  Plan of Treatment/Functional Goals    Planned Interventions: gait training, strengthening, transfer training, neuromuscular re-education, home exercise program     Goals: See Physical Therapy Goals on Care Plan in [employer] electronic health record.    Therapy Frequency: 5x/week  Predicted Duration of Therapy Intervention: 07/30/23  _________________________________________________________________________________    I CERTIFY THE NEED FOR THESE SERVICES FURNISHED UNDER        THIS PLAN OF TREATMENT AND WHILE UNDER MY CARE     (Physician attestation of this document indicates review and certification of the therapy plan).              Certification date from: (P) 07/23/23, Certification date to: (P) 07/30/23    Referring Physician: Rafaela Hernandez MD            Initial Assessment        See Physical Therapy evaluation dated (P) 07/23/23 in Epic electronic health record.

## 2023-07-23 NOTE — PHARMACY-ADMISSION MEDICATION HISTORY
Pharmacist Admission Medication History    Admission medication history is complete. The information provided in this note is only as accurate as the sources available at the time of the update.    Medication reconciliation/reorder completed by provider prior to medication history? No    Information Source(s): Patient, Family member, Clinic records and CareEverywhere/SureScripts via in-person and phone    Pertinent Information:     Attempted to reach patient's family members to review pt's home medications as pt not a good historian. Left messages with Blas (spouse) and Khris (son). Med list updated based on care everywhere records and patient's pharmacy fill history. Last doses unknown.     Changes made to PTA medication list:    Added: None    Deleted: None    Changed: None    Medication History Completed By:Joan Cohen, PharmD, BCPS 7/23/2023 9:55 AM    Prior to Admission medications    Medication Sig Last Dose Taking? Auth Provider Long Term End Date   aspirin 81 mg chewable tablet [ASPIRIN 81 MG CHEWABLE TABLET] Chew 81 mg daily. Unknown Yes Provider, Historical     atenolol (TENORMIN) 25 MG tablet TAKE ONE TABLET BY MOUTH ONE TIME DAILY Unknown Yes Kushal Motley MD Yes    black cohosh 540 mg cap Take 1 capsule by mouth daily Unknown Yes Provider, Historical     calcium carbonate 600 mg-vitamin D 400 units (CALTRATE) 600-400 MG-UNIT per tablet Take 1 tablet by mouth daily Unknown Yes Reported, Patient     FOLIC ACID/MULTIVITS-MIN/LUT (CENTRUM SILVER ORAL) Take 1 tablet by mouth daily Unknown Yes Provider, Historical     MV,CA,MIN/FA/HERBAL NO.158 (ESTROVEN ENERGY ORAL) Take 1 tablet by mouth daily Unknown Yes Provider, Historical     simvastatin (ZOCOR) 10 MG tablet Take 1 tablet (10 mg) by mouth At Bedtime Unknown Yes Kushal Motley MD Yes    tiZANidine (ZANAFLEX) 4 MG tablet Take 1 tablet (4 mg) by mouth 3 times daily as needed for muscle spasms  Yes Gordo Ramon MD     vitamin A-vitamin  C-vit E-min (OCUVITE) Tab tablet [VITAMIN A-VITAMIN C-VIT E-MIN (OCUVITE) TAB TABLET] Take 2 tablets by mouth daily. Unknown Yes Provider, Historical     vitamin D3 (CHOLECALCIFEROL) 50 mcg (2000 units) tablet Take 1 tablet by mouth daily Unknown Yes Reported, Patient

## 2023-07-23 NOTE — ED PROVIDER NOTES
Emergency Department Encounter      NAME: Zahra Fitzgerald  AGE: 78 year old female  YOB: 1945  MRN: [id number]  EVALUATION DATE & TIME: 2023 10:31 PM    PCP: Kushal Motley    ED PROVIDER: Toro Isaac M.D.      Chief Complaint   Patient presents with     Altered Mental Status         FINAL IMPRESSION:  1. Normal pressure hydrocephalus (H)        MEDICAL DECISION MAKIN:05 PM I met with the patient, obtained history, performed an initial exam, and discussed options and plan for diagnostics and treatment here in the ED.   2:08 AM I spoke with Dr. Pedro in neurology. He agrees that the patient can stay here.   2:07 AM I updated the patient and family on the plan.  2:56 AM I spoke with the accepting hospitalist, Dr. Lainez.    This patient is a 78-year-old female with a history of dementia who is brought to the ER by paramedics because she was unresponsive at home.  The son is present and says that she was fine up until today.  I did get a report that she and her  got into an argument yesterday.  Son says that she has been in bed all day.  She has not eaten or drank anything all day.  She did try sitting up on the edge of the bed but was not able to take a step.  Paramedics tried a sternal rub but she still did not react to them.  She had a normal blood sugar 115 for them.  On my initial exam patient did not respond to me, would not open her eyes, would not answer questions or follow commands.  It looks like this was voluntary and has I could see her eyes fluttering and she reacted when I touched her eyelashes.  I did end up ordering lab work and a CT scan.  The CT scan as I was concerned that she may have fallen and hit her head.  I was concerned about the possibility of a skull fracture, subdural hematoma, intraparenchymal hemorrhage or subarachnoid hemorrhage.  The head CT showed ventriculomegaly out of proportion for her atrophy.  The radiologist said it was compatible  with normal pressure hydrocephalus.  I independently reviewed and interpreted the CT.  Remaining lab work was unremarkable with a normal comprehensive metabolic profile, CBC, magnesium and a very slightly elevated CRP.  A UA was ordered but was not obtained.  I spoke to Dr. Lainez who wanted us to consult neurology before she was admitted here.  I spoke to Dr. Pedro with neurology.  He was comfortable with the patient here.  He said that often the normal pressure hydrocephalus work-ups are done as an outpatient with referral to the neurosurgery normal pressure hydrocephalus clinic and to neurology.  The high-volume lumbar puncture is can be done as an outpatient as well.  However I discussed this with the patient and her son but they were not comfortable taking her home because of her weakness and difficulty with her balance.  Pertinent Labs & Imaging studies reviewed. (See chart for details)    Medical Decision Making     History:    Supplemental history from: Son    External Record(s) reviewed: Documented in chart, if applicable.     Work Up:    Chart documentation includes differential considered and any EKGs or imaging independently interpreted by provider, where specified.    In additional to work up documented, I considered the following work up: Documented in chart, if applicable.     External consultation:    Discussion of management with another provider: Documented in chart, if applicable     Complicating factors:    Care impacted by chronic illness: Dementia, hypertension, hyperlipidemia    Care affected by social determinants of health: Access to Medical Care     Disposition considerations: Admit      MEDICATIONS GIVEN IN THE EMERGENCY:  Medications   0.9% sodium chloride BOLUS (500 mLs Intravenous $New Bag 7/22/23 0613)       NEW PRESCRIPTIONS STARTED AT TODAY'S ER VISIT:  New Prescriptions    No medications on file           =================================================================    HPI    Patient information was obtained from: son    Use of : N/A       Zahra Fitzgerald is a 78 year old female with a past medical history of dementia, hypertension, and hyperlipidemia who presents with altered mental status.    HPI limited due to unresponsiveness    Per son, the patient has been completely unresponsive since this afternoon, has not spoken since this morning, and has not eaten or drank anything since yesterday. She did not respond to a sternal rub by EMS upon arrival. She was cleaned and changed this morning by her  but has not been willing to eat or drink. She was sitting at the edge of her bed this afternoon and looked like she was trying to walk but was not able to take a step. She did not speak during any of this. Upon arrival here she is still unresponsive and does not interact during the exam. Vital signs have been stable. Of note, she had sciatic pains a few weeks ago and was acting normal at that time. She is not taking pain medications for that anymore.     She has not had a fever.        REVIEW OF SYSTEMS   Review of Systems   Unable to perform ROS: Patient unresponsive        PAST MEDICAL HISTORY:  No past medical history on file.    PAST SURGICAL HISTORY:  Past Surgical History:   Procedure Laterality Date     [facility] APPENDECTOMY      Description: Appendectomy;  Recorded: 03/12/2009;       CURRENT MEDICATIONS:    No current facility-administered medications for this encounter.    Current Outpatient Medications:      atenolol (TENORMIN) 25 MG tablet, TAKE ONE TABLET BY MOUTH ONE TIME DAILY, Disp: 90 tablet, Rfl: 3     aspirin 81 mg chewable tablet, [ASPIRIN 81 MG CHEWABLE TABLET] Chew 81 mg daily., Disp: , Rfl:      black cohosh 540 mg cap, [BLACK COHOSH 540 MG CAP] Take by mouth. Take 1 capsule daily, Disp: , Rfl:      calcium carbonate 600 mg-vitamin D 400 units (CALTRATE) 600-400 MG-UNIT per  tablet, Take 1 tablet by mouth daily, Disp: , Rfl:      denosumab 60 mg/mL Syrg, [DENOSUMAB 60 MG/ML SYRG] Inject 60 mg under the skin once., Disp: , Rfl:      docusate sodium (COLACE) 100 MG capsule, Take 1 capsule (100 mg) by mouth 2 times daily, Disp: 30 capsule, Rfl: 0     FOLIC ACID/MULTIVITS-MIN/LUT (CENTRUM SILVER ORAL), [FOLIC ACID/MULTIVITS-MIN/LUT (CENTRUM SILVER ORAL)] Take by mouth. womens ultra, Disp: , Rfl:      MV,CA,MIN/FA/HERBAL NO.158 (ESTROVEN ENERGY ORAL), [MV,CA,MIN/FA/HERBAL NO.158 (ESTROVEN ENERGY ORAL)] Take by mouth., Disp: , Rfl:      polyethylene glycol (MIRALAX) 17 GM/Dose powder, Take 17 g (1 capful) by mouth daily, Disp: 116 g, Rfl: 0     simvastatin (ZOCOR) 10 MG tablet, Take 1 tablet (10 mg) by mouth At Bedtime, Disp: 90 tablet, Rfl: 3     tiZANidine (ZANAFLEX) 4 MG tablet, Take 1 tablet (4 mg) by mouth 3 times daily as needed for muscle spasms, Disp: 10 tablet, Rfl: 0     vitamin A-vitamin C-vit E-min (OCUVITE) Tab tablet, [VITAMIN A-VITAMIN C-VIT E-MIN (OCUVITE) TAB TABLET] Take 2 tablets by mouth daily., Disp: , Rfl:      vitamin D3 (CHOLECALCIFEROL) 50 mcg (2000 units) tablet, Take 1 tablet by mouth daily, Disp: , Rfl:     ALLERGIES:  No Known Allergies    FAMILY HISTORY:  Family History   Problem Relation Age of Onset     Breast Cancer Mother         Unsure of age     Breast Cancer Maternal Aunt 65.00     Breast Cancer Maternal Aunt 65.00     Breast Cancer Sister 42.00       SOCIAL HISTORY:   Social History     Socioeconomic History     Marital status:    Tobacco Use     Smoking status: Never     Smokeless tobacco: Never       PHYSICAL EXAM:    Vitals: /63   Pulse 81   Temp 98.2  F (36.8  C) (Oral)   Resp 28   Ht 1.524 m (5')   Wt 66.2 kg (146 lb)   SpO2 98%   BMI 28.51 kg/m     Constitutional:  Elderly female who lies in bed with eyes closed. She does not answer or interact in any way.   HEAD:Normocephalic, atraumatic,   Eyes: PERRLA, , conjunctiva clear, no  discharge. Positive corneal reflex.  ENT: mucous membranes moist, nose normal.   Neck- Supple, gross ROM intact.  No JVD.  No palpable nodes. No obvious midline tenderness.  Pulmonary: Clear to auscultation bilaterally, no respiratory distress, no wheezing, speaks full sentences easily.  Chest: No chest wall tenderness  Cardiovascular: Normal heart rate, regular rhythm, no murmurs. No lower extremity edema, 2+ DP pulses.   GI: Soft, no tenderness to deep palpation in all quadrants, not distended, no masses.  No hepatosplenomegaly.  Musculoskeletal: Moving all 4 extremities intentionally and without pain. No obvious deformity. No bony tenderness.  Skin: Warm, dry, no rash.  Neurologic: Alert & oriented x 3, speech clear, moving all extremities spontaneously   Psychiatric: Affect normal, cooperative.     LAB:  All pertinent labs reviewed and interpreted.  Labs Ordered and Resulted from Time of ED Arrival to Time of ED Departure   COMPREHENSIVE METABOLIC PANEL - Abnormal       Result Value    Sodium 141      Potassium 3.7      Chloride 107      Carbon Dioxide (CO2) 24      Anion Gap 10      Urea Nitrogen 15      Creatinine 0.89      Calcium 9.1      Glucose 98      Alkaline Phosphatase 78      AST 20      ALT 12      Protein Total 6.3      Albumin 3.2 (*)     Bilirubin Total 1.0      GFR Estimate 66     CRP INFLAMMATION - Abnormal    CRP 3.8 (*)    MAGNESIUM - Normal    Magnesium 1.8     CBC WITH PLATELETS AND DIFFERENTIAL    WBC Count 6.7      RBC Count 3.80      Hemoglobin 12.0      Hematocrit 36.5      MCV 96      MCH 31.6      MCHC 32.9      RDW 12.9      Platelet Count 182      % Neutrophils 68      % Lymphocytes 18      % Monocytes 11      % Eosinophils 2      % Basophils 1      % Immature Granulocytes 0      NRBCs per 100 WBC 0      Absolute Neutrophils 4.6      Absolute Lymphocytes 1.2      Absolute Monocytes 0.7      Absolute Eosinophils 0.2      Absolute Basophils 0.0      Absolute Immature Granulocytes 0.0       Absolute NRBCs 0.0     ROUTINE UA WITH MICROSCOPIC REFLEX TO CULTURE       RADIOLOGY:  CT Head w/o Contrast   Final Result   IMPRESSION:   1.  No acute intracranial process.   2.  Ventriculomegaly disproportionate to the degree of volume loss. Correlate for normal pressure hydrocephalus.          EKG:   Performed at: July 22, 2023, 10:46 PM, [facility] EMERGENCY ROOM    Impression: Sinus rhythm. Normal ECG. When compared with ECG of 14-DEC-2001, no significant change was found.  Rate: 81 BPM  Rhythm: Sinus rhythm  QRS Interval: 78 ms  QTc Interval: 382/443 ms  Comparison: When compared with ECG of 14-DEC-2001, no significant change was found.    I have independently reviewed and interpreted the EKG(s) documented above.       I, Vijay Arita, am serving as a scribe to document services personally performed by Dr. Toro Isaac based on my observation and the provider's statements to me. I, Toro Isaac M.D. attest that Vijay Arita is acting in a scribe capacity, has observed my performance of the services and has documented them in accordance with my direction.      Toro Isaac M.D.  Emergency Medicine  [facility] EMERGENCY ROOM  [address] [id number] [phone]  Dept: [phone]     Toro Isaac MD  07/23/23 4343

## 2023-07-23 NOTE — UTILIZATION REVIEW
"Admission Status; Secondary Review Determination     Under the authority of the Utilization Management Committee, the utilization review process indicated a secondary review on Zahra Fitzgerald.  The review outcome is based on review of the medical records, discussions with staff, and applying clinical experience noted on the date of the review.     (x) Observation Status Appropriate - This patient does not meet hospital inpatient criteria and is placed in observation status. If this patient's primary payer is Medicare and was admitted as an inpatient, Condition Code 44 should be used and patient status changed to \"observation\".     RATIONALE FOR DETERMINATION   Zahra Wright s a 78 yr old female with dementia who presented with unresponsiveness on 7/22/23.  ED eval with normal labs.  Poor oral intake/not eating/drinking for 24 hours after argument with spouse.  Possible behavioral component but also concern for ventriculomegaly and possible NPH.  PT/OT eval pending.  NPH is typically an outpatient evaluation.  At this time, patient medically stable but awaiting safe disposition given weakness/balance concerns.    The severity of illness, intensity of service provided, expected LOS and risk for adverse outcome make the care appropriate for further observation; however, doesn't meet criteria for hospital inpatient admission. Dr Hernandez notified of this determination and agrees with downgrade of status.      The information on this document is developed by the utilization review team in order for the business office to ensure compliance.  This only denotes the appropriateness of proper admission status and does not reflect the quality of care rendered.         The definitions of Inpatient Status and Observation Status used in making the determination above are those provided in the CMS Coverage Manual, Chapter 1 and Chapter 6, section 70.4.      Sincerely,  Jackie Hanson MD  Utilization " Review  Physician Advisor  [facility]

## 2023-07-23 NOTE — TELEPHONE ENCOUNTER
Call received from sonLui  Consent to Communicate on chart    Zahra has been sleeping and has not gotten out of bed for ~24 hrs.    - Family has woken her but she get irritated and goes back to sleep.  - No food or water during this time  - Too weak to stand  - She sat on the edge of the bed this morning but would not get up.  - Yesterday had a Runny nose & some coughing     No recent medication changes    Per son, possible hx of Dementia, (not noted in EMR)    911 advised    Kimberly García RN  [facility] Nurse Advisors      Reason for Disposition    Difficult to awaken or acting confused (e.g., disoriented, slurred speech)    Additional Information    Negative: SEVERE difficulty breathing (e.g., struggling for each breath, speaks in single words)    Negative: Shock suspected (e.g., cold/pale/clammy skin, too weak to stand, low BP, rapid pulse)    Protocols used: WEAKNESS (GENERALIZED) AND FATIGUE-A-AH

## 2023-07-23 NOTE — PLAN OF CARE
Pt discharged home with sons. AVS went over, all questions answered. Pt was sent with one belongings bag.

## 2023-07-24 ENCOUNTER — DOCUMENTATION ONLY (OUTPATIENT)
Dept: OTHER | Facility: CLINIC | Age: 78
End: 2023-07-24
Payer: MEDICARE

## 2023-07-24 ENCOUNTER — TELEPHONE (OUTPATIENT)
Dept: INTERNAL MEDICINE | Facility: CLINIC | Age: 78
End: 2023-07-24

## 2023-07-24 DIAGNOSIS — G30.1 SEVERE LATE ONSET ALZHEIMER'S DEMENTIA WITH MOOD DISTURBANCE (H): Primary | ICD-10-CM

## 2023-07-24 DIAGNOSIS — F02.C3 SEVERE LATE ONSET ALZHEIMER'S DEMENTIA WITH MOOD DISTURBANCE (H): Primary | ICD-10-CM

## 2023-07-24 LAB
ATRIAL RATE - MUSE: 81 BPM
DIASTOLIC BLOOD PRESSURE - MUSE: 57 MMHG
INTERPRETATION ECG - MUSE: NORMAL
P AXIS - MUSE: 24 DEGREES
PR INTERVAL - MUSE: 154 MS
QRS DURATION - MUSE: 78 MS
QT - MUSE: 382 MS
QTC - MUSE: 443 MS
R AXIS - MUSE: 13 DEGREES
SYSTOLIC BLOOD PRESSURE - MUSE: 157 MMHG
T AXIS - MUSE: 12 DEGREES
VENTRICULAR RATE- MUSE: 81 BPM

## 2023-07-24 NOTE — TELEPHONE ENCOUNTER
7-24-23    Order/Referral Request    Who is requesting: ganesh Poe    Orders being requested: assisted living     Reason service is needed/diagnosis: dementia  Son cristóbal called stated Zahra's dementia is failing fast & family is now at a point where they can't take care of her & they are needed assistance to putting patient in a assisted living facility     When are orders needed by: soon    Does patient have a preference on a Group/Provider/Facility? [place]        Okay to leave a detailed message?: Yes at Cell number on file:    Telephone Information:   Mobile [phone]

## 2023-07-24 NOTE — PROGRESS NOTES
Physical Therapy Discharge Summary    Reason for therapy discharge:    No further expectations of functional progress.    Progress towards therapy goal(s). See goals on Care Plan in [facility] electronic health record for goal details.  Goals met    Therapy recommendation(s):    No further therapy is recommended.

## 2023-07-24 NOTE — PROGRESS NOTES
Occupational Therapy Discharge Summary    Reason for therapy discharge:    Discharged to home.    Progress towards therapy goal(s). See goals on Care Plan in [facility] electronic health record for goal details.  Goals not met.  Barriers to achieving goals:   discharge from facility.    Therapy recommendation(s):    Continued therapy is recommended.  Rationale/Recommendations:  family to support at TCU.

## 2023-07-26 ENCOUNTER — PATIENT OUTREACH (OUTPATIENT)
Dept: CARE COORDINATION | Facility: CLINIC | Age: 78
End: 2023-07-26
Payer: MEDICARE

## 2023-07-26 NOTE — PROGRESS NOTES
Clinic Care Coordination Contact  [facility]/Voicemail       Clinical Data: Care Coordinator Outreach  Outreach attempted x 1.  Left message on patient's sons voicemail with call back information and requested return call.    Plan: Care Coordinator will try to reach patient again in 1-2 business days.    IFTIKHAR Medina covering for Pratima Dorantes

## 2023-07-27 ENCOUNTER — OFFICE VISIT (OUTPATIENT)
Dept: INTERNAL MEDICINE | Facility: CLINIC | Age: 78
End: 2023-07-27
Payer: MEDICARE

## 2023-07-27 ENCOUNTER — PATIENT OUTREACH (OUTPATIENT)
Dept: CARE COORDINATION | Facility: CLINIC | Age: 78
End: 2023-07-27

## 2023-07-27 VITALS
SYSTOLIC BLOOD PRESSURE: 121 MMHG | WEIGHT: 145.7 LBS | TEMPERATURE: 98 F | HEART RATE: 66 BPM | DIASTOLIC BLOOD PRESSURE: 70 MMHG | BODY MASS INDEX: 28.61 KG/M2 | HEIGHT: 60 IN | OXYGEN SATURATION: 99 %

## 2023-07-27 DIAGNOSIS — Z92.29 PERSONAL HISTORY OF OTHER DRUG THERAPY: ICD-10-CM

## 2023-07-27 DIAGNOSIS — G91.2 NPH (NORMAL PRESSURE HYDROCEPHALUS) (H): ICD-10-CM

## 2023-07-27 DIAGNOSIS — Z09 HOSPITAL DISCHARGE FOLLOW-UP: Primary | ICD-10-CM

## 2023-07-27 DIAGNOSIS — G30.1 SEVERE LATE ONSET ALZHEIMER'S DEMENTIA WITH MOOD DISTURBANCE (H): ICD-10-CM

## 2023-07-27 DIAGNOSIS — F02.C3 SEVERE LATE ONSET ALZHEIMER'S DEMENTIA WITH MOOD DISTURBANCE (H): ICD-10-CM

## 2023-07-27 DIAGNOSIS — I10 ESSENTIAL HYPERTENSION: ICD-10-CM

## 2023-07-27 DIAGNOSIS — E78.2 MIXED HYPERLIPIDEMIA: ICD-10-CM

## 2023-07-27 DIAGNOSIS — M81.0 AGE-RELATED OSTEOPOROSIS WITHOUT CURRENT PATHOLOGICAL FRACTURE: ICD-10-CM

## 2023-07-27 PROCEDURE — 96372 THER/PROPH/DIAG INJ SC/IM: CPT | Performed by: INTERNAL MEDICINE

## 2023-07-27 PROCEDURE — 99214 OFFICE O/P EST MOD 30 MIN: CPT | Mod: 25 | Performed by: INTERNAL MEDICINE

## 2023-07-27 NOTE — PROGRESS NOTES
Clinic Care Coordination Contact  Community Health Worker Initial Outreach    CHW Initial Information Gathering:  Referral Source: PCP  Preferred Hospital: [facility]  [phone]  Current living arrangement:: I live in a private home with family  Type of residence:: Private home - no stairs  Community Resources: None  Supplies Currently Used at Home: None  Equipment Currently Used at Home: walker, rolling, walker, josé, shower chair  Informal Support system:: Children, Spouse  No PCP office visit in Past Year: Yes (6/22/22 with Dr. Motley. patient has PCP appt today 7/27/23 with Dr. Motley also.)  Transportation means:: Family  CHW Additional Questions  MyChart active?: No  Patient agreeable to assistance with activating MyChart?: Yes    Patient accepts CC: Yes. Patient scheduled for assessment with KRZYSZTOF Blanca on 8/1/23 at 9:00. Patient noted desire to discuss:    - Support looking into Help in the Home    - Assisted living resources for the future    - Support setting up Mychart    ** For SW Assessment call patients ganesh Pinedo @ [phone].-Consent to Communicate on file dated 6/18/18.    Pratima Dorantes  [facility] Worker  [facility]  Clinic Care Coordination   [facility]  Office: [phone]

## 2023-07-27 NOTE — PROGRESS NOTES
"  Assessment & Plan     Hospital discharge follow-up  78 year old female into [facility] on 7/22/2023 after presenting with decreased  Responsiveness.  History is per her sons who state she is normally at home with   who has been recently ill with upper viral infection.  2 days ago she was  Less interactive and responsive. On the day of admission to the ER she was in bed  All day.  She did not \"wake\" to a sternal rub.  Per further interviews in the ER she  Was improving then returned to her baseline.     Work up in the ER included lab analysis that was relevant for small amount of ketones  In the urine.  Vitals were stable. She had a normal white count and basic metabolic  Panel.  She was afebrile. She was given a 1 liter normal saline fluid bolus.   CT head without contrast showed ventriculomegaly consistent  With high suspicious for NPH.  Per a telephone call with the neurology team this can  Be worked up as an outpatient. She was admitted here for further observation.     On my interview with the sons at the bedside she has clear dementia. She is sitting in   The chair feeding herself without issue. She needs assistance to the bathroom though  Uses the walker almost independently.  She laughs inappropriately and remembers  Some but not all of conversation.  She is a vulnerable adult.     I spoke at length with the sons. They say  is capable to care for her and does  Go to the appointments.  I shared information regarding possibility she has  NPH.  At times treatments are pursued.  I recommended close clinical follow up  With primary care for initial neurology referral and eventually possibly a neurosurgery  Referral.  They understand.  She is safe for discharge.      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  Care Coordination team is involved in helping the patient and the family. She is here today with her son and her . Last 3 days at home were good. " Patient cannot recall recent hospitalization or any previous conversation with her family about recent events. She is eating and sleeping well.  - Adult Neurology  Referral; Future    NPH (normal pressure hydrocephalus) (H)  See above. Referral placed for Neurology and Neurosurgery.  - Adult Neurology  Referral; Future  - Neurosurgery Referral; Future    Age-related osteoporosis without current pathological fracture  Last Prolia dose was in 6/2022.  Prolia 15th today.      Prolia 16th in 6 months with AWV with me.    DXA due now .   Phone number to schedule [phone].    The following steps were completed to comply with the REMS program for Prolia:    Reviewed information in the Medication Guide and Patient Counseling Chart, including the serious risks of Prolia  and the symptoms of each risk.    Advised patient to seek prompt medical attention if they have signs or symptoms of any of the serious risks.    Provided each patient a copy of the Medication Guide and Patient Brochure   - denosumab (PROLIA) injection 60 mg    Mixed hyperlipidemia  Stable on statin    Hypertension  Stable on atenolol    Personal history of other drug therapy  H/o bisphosphonate use.  - denosumab (PROLIA) injection 60 mg      Patient Instructions   Prolia 15th today.  To schedule visit with Neurology and Neurosurgery.    Prolia 16th in 6 months with AWV with me.    DXA due now .   Phone number to schedule [phone].    Daily calcium need is 4108-4730 mg a day from the diet and supplements.  Calcium citrate is easier to digest.  Vitamin D 2000 IU daily recommended.    Risk of rebound vertebral fractures is higher when Prolia suddenly stopped or dose was missed.      Prolia and Covid vaccine should be  for at least a week.       MED REC REQUIRED  Post Medication Reconciliation Status:  Discharge medications reconciled, continue medications without change  BMI:   Estimated body mass index is 28.46 kg/m  as  calculated from the following:    Height as of this encounter: 1.524 m (5').    Weight as of this encounter: 66.1 kg (145 lb 11.2 oz).       Return in about 6 months (around 1/27/2024) for AWV, Prolia.          Kushal Motley MD  [facility]    Shantelle Sweeney is a 78 year old, presenting for the following health issues:  .Discuss Dementia  (.Discuss Dementia - Getting Worse - Family Needs Help)        7/27/2023    10:47 AM   Additional Questions   Roomed by Jennifer       History of Present Illness       Reason for visit:  [place]    She eats 2-3 servings of fruits and vegetables daily.She consumes 0 sweetened beverage(s) daily.She exercises with enough effort to increase her heart rate 9 or less minutes per day.  She exercises with enough effort to increase her heart rate 3 or less days per week.   She is taking medications regularly.         Hospital Follow-up Visit:    Hospital/Nursing Home/IP Rehab Facility: [facility]  Date of Admission: 7/22/23  Date of Discharge: 7/23/23  Reason(s) for Admission: 1.  Dehydration  2. Dementia  3.  NPH  4.  HTN  5.  Acute metabolic encephalopathy due to dehydration, dementia    Was your hospitalization related to COVID-19? No   Problems taking medications regularly:  None  Medication changes since discharge: None  Problems adhering to non-medication therapy:  None    Summary of hospitalization:  [facility] discharge summary reviewed  Diagnostic Tests/Treatments reviewed.  Follow up needed: Neurology and Neurosurgery  Other Healthcare Providers Involved in Patient s Care:         None  Update since discharge: stable.         Plan of care communicated with patient and family                   Review of Systems         Objective    /70   Pulse 66   Temp 98  F (36.7  C)   Ht 1.524 m (5')   Wt 66.1 kg (145 lb 11.2 oz)   SpO2 99%   BMI 28.46 kg/m    Body mass index is 28.46 kg/m .  Physical  Exam   Constitutional:  oriented to person, place, and time, appears well-nourished. No distress.   HENT:   Head: Normocephalic.   Eyes: Conjunctivae are normal.   Neck: Normal range of motion. Neck supple.   Pulmonary/Chest: Effort normal   Musculoskeletal: Normal range of motion.   Neurological: alert and oriented to person, place, and time. Skin: Skin is warm.   Psychiatric: agitated with significant short memory loss, cannot recall recent hospitalization

## 2023-07-27 NOTE — PATIENT INSTRUCTIONS
Prolia 15th today.  To schedule visit with Neurology and Neurosurgery.    Prolia 16th in 6 months with AWV with me.    DXA due now .   Phone number to schedule [phone].    Daily calcium need is 8423-6942 mg a day from the diet and supplements.  Calcium citrate is easier to digest.  Vitamin D 2000 IU daily recommended.    Risk of rebound vertebral fractures is higher when Prolia suddenly stopped or dose was missed.      Prolia and Covid vaccine should be  for at least a week.

## 2023-08-01 ENCOUNTER — DOCUMENTATION ONLY (OUTPATIENT)
Dept: OTHER | Facility: CLINIC | Age: 78
End: 2023-08-01

## 2023-08-01 ENCOUNTER — PATIENT OUTREACH (OUTPATIENT)
Dept: NURSING | Facility: CLINIC | Age: 78
End: 2023-08-01
Payer: MEDICARE

## 2023-08-01 ASSESSMENT — ACTIVITIES OF DAILY LIVING (ADL)
DEPENDENT_IADLS:: CLEANING;COOKING;LAUNDRY;SHOPPING;MEAL PREPARATION;MEDICATION MANAGEMENT;MONEY MANAGEMENT;TRANSPORTATION

## 2023-08-01 NOTE — PROGRESS NOTES
Clinic Care Coordination Contact  Clinic Care Coordination Contact  OUTREACH    Referral Information:  Referral Source: PCP    Primary Diagnosis: Psychosocial    Chief Complaint   Patient presents with    Clinic Care Coordination - Initial        Universal Utilization:   Clinic Utilization  Difficulty keeping appointments:: No  Compliance Concerns: No  No-Show Concerns: No  No PCP office visit in Past Year: Yes (6/22/22 with Dr. Motley. patient has PCP appt today 7/27/23 with Dr. 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 Motley to make sure Zahra's most recent office visit note meets Medicare standards and have it updated if need be. Once confirmed, we will begin the search for an agency with openings assuming Medicare approves.     I am concerned that Medicare might not pay for Home Care Services due to the following:     Home health aide services: Medicare will pay for part-time or  intermittent home health aide services (like personal care), if you need  them to maintain your health or treat your illness or injury. However,  Medicare doesn t cover home health aide services unless you re also  getting skilled care. Skilled care includes:  Skilled  nursing care  Physical therapy  Speech-language pathology services  Continuing occupational therapy, if you no longer need any of  the above    What isn t covered  Medicare doesn t pay for:  24-hour-a-day care at home  Meals delivered to your home  Services, like shopping, cleaning, and laundry  long term or personal care like bathing, dressing, and using the  bathroom (when this is the only care you need)    Essentially - I am worried that Medicare might not approve of Home Care as Zahra doesn't also need a skilled therapy. I have asked Dr. Motley to see if this would be approved also. In the meantime; you could also reach out to Zahra's secondary insurance to see if they offer any covered Home Care just in case Medicare denies.      If all else fails we can explore private pay options.     My Chart:   My Chart Help - [phone]  Or reach out to Pratima Jose E for assistance - [phone]      Health Maintenance Reviewed: Due/Overdue   Health Maintenance Due   Topic Date Due    HEPATITIS C SCREENING  Never done    ZOSTER IMMUNIZATION (2 of 3) 02/21/2008    COVID-19 Vaccine (4 - Pfizer series) 04/12/2022    MEDICARE ANNUAL WELLNESS VISIT  07/13/2022      Clinical Pathway: None    Medication Management:  Medication review status: Medications reviewed and no changes reported per patient.             Functional Status:  Dependent ADLs:: Independent, Incontinence, Dressing, Bathing  Dependent IADLs:: Cleaning, Cooking, Laundry, Shopping, Meal Preparation, Medication Management, Money Management, Transportation  Bed or wheelchair confined:: No  Mobility Status: Independent w/Device  Fallen 2 or more times in the past year?: Yes  Any fall with injury in the past year?: No    Living Situation:  Current living arrangement:: I live in a private home with family  Type of residence:: Private home - no stairs    Lifestyle & Psychosocial Needs:    Social Determinants of Health     Tobacco Use: Low Risk   (7/27/2023)    Patient History     Smoking Tobacco Use: Never     Smokeless Tobacco Use: Never     Passive Exposure: Not on file   Alcohol Use: Not on file   Financial Resource Strain: Not on file   Food Insecurity: Not on file   Transportation Needs: Not on file   Physical Activity: Not on file   Stress: Not on file   Social Connections: Not on file   Intimate Partner Violence: Not on file   Depression: Not at risk (7/27/2023)    PHQ-2     PHQ-2 Score: 0   Housing Stability: Not on file     Diet:: Regular  Inadequate nutrition (GOAL):: No  Tube Feeding: No  Inadequate activity/exercise (GOAL):: No  Significant changes in sleep pattern (GOAL): No  Transportation means:: Family     Samaritan or spiritual beliefs that impact treatment:: No  Mental health DX:: No  Mental health management concern (GOAL):: No  Chemical Dependency Status: No Current Concerns  Informal Support system:: Children, Spouse             Resources and Interventions:  Current Resources:      Community Resources: None  Supplies Currently Used at Home: None  Equipment Currently Used at Home: walker, rolling, walker, josé, shower chair  Employment Status: retired         Advance Care Plan/Directive  Advanced Care Plans/Directives on file:: Yes  Type Advanced Care Plans/Directives: Advanced Directive - On File    Referrals Placed: Home Care, Assisted Living       Care Plan:  Care Plan: Help At Home       Problem: In-home support       Goal: Establish home support       Start Date: 8/1/2023    Note:     Goal Statement: I would like to look into home support over the next two months as well as plan for potential future living arrangements.   Barriers: application timelines, Medicare Coverage  Strengths: accepting of help  Patient expressed understanding of goal: yes    Action steps to achieve this goal:  My family will review resources sent to me via email. Programs that are free to search, like realVTX Technology for Assisted Living. [facility] Care:  [email or web address].[email or web address]  Choice Connection: [email or web address]/[place]/  My family will consider connecting with resources that are the best fit for me.   My family will work with my PCP and care coordination team as we work to get home care in place.   My family will continue to outreach to care coordination as needed for additional resources or supports.                                 Patient/Caregiver understanding: Pt reports understanding and denies any additional questions or concerns at this times. SW CC engaged in AIDET communication during encounter.     Outreach Frequency: monthly  Future Appointments                In 6 months Kushal Motley MD [facility], [facility]            Plan: CCSW completed enrollment to [facility]. [facility] will complete outreach in about 4 weeks. CCSW will complete chart review in about 6 weeks. CCSW provided resources over the phone and via email. Writer communicated the risks of unencrypted electronic communication and the patient and/or patient representative has agreed to accept the risks and receive unencrypted communication related to the information or resources we have discussed. We reviewed that no PHI will be included.  Email address verified with the patient. CCSW and Pt's son talked about main concerns which were home care, future living situation, and my chart. Pt and CCSW could not get my chart activated - my chart help number was provided. CCSW and Pt's son talked about future living situation and option to work with providers to find assisted living were given. Home care options were discussed. CCSW will check with PCP to ensure most recent note is up to date for medicare standards and see about getting home care covered that way; PT/OT/Speech will also be needed in order for Medicare to cover. CCSW let Pt's son that they can also check with secondary insurance to see what they would  cover. Last option is private pay.

## 2023-08-01 NOTE — LETTER
[facility] CARE COORDINATION  [address] [address]   August 1, 2023    Zahra Fitzgerald  [address] [address]      Dear Zahra and Khris,        I am a  clinic care coordinator who works with Kushal Motley MD with the [facility]. I wanted to thank you for spending the time to talk with me.  Below is a description of the resources we talked about today.     Assisted Living Search Help -   Programs that are free to search, like realtors for Assisted Living  [facility]  https://[facility].ReviewPro/     Choice Connection  [email or web address]      Home Care Services-   I have reached out to Dr. Motley to make sure Zahra's most recent office visit note meets Medicare standards and have it updated if need be. Once confirmed, we will begin the search for an agency with openings assuming Medicare approves.     I am concerned that Medicare might not pay for Home Care Services due to the following:     Home health aide services: Medicare will pay for part-time or  intermittent home health aide services (like personal care), if you need  them to maintain your health or treat your illness or injury. However,  Medicare doesn t cover home health aide services unless you re also  getting skilled care. Skilled care includes:  Skilled nursing care  Physical therapy  Speech-language pathology services  Continuing occupational therapy, if you no longer need any of  the above    What isn t covered  Medicare doesn t pay for:  24-hour-a-day care at home  Meals delivered to your home  Services, like shopping, cleaning, and laundry  FDC or personal care like bathing, dressing, and using the  bathroom (when this is the only care you need)    Essentially - I am worried that Medicare might not approve of Home Care as Zahra doesn't also need a skilled therapy. I have asked Dr. Motley to see if this would be approved also. In  the meantime; you could also reach out to [facility]'s secondary insurance to see if they offer any covered Home Care just in case Medicare denies.      If all else fails we can explore private pay options.     My Chart:   My Chart Help - [phone]  Or reach out to Pratima Dorantes for assistance - [phone]      Please feel free to contact me with any questions or concerns regarding care coordination and what we can offer.      We are focused on providing you with the highest-quality healthcare experience possible.    Sincerely,     Rianna Mariee, [facility] Clinical Care Coordinator  [facility], and [facility]     Enclosed: I have enclosed a copy of the Patient Centered Plan of Care. This has helpful information and goals that we have talked about. Please keep this in an easy to access place to use as needed.

## 2023-08-01 NOTE — LETTER
[facility]  Patient Centered Plan of Care  About Me:        Patient Name:  Zahra Fitzgerald    YOB: 1945  Age:         78 year old   [place] MRN:    [id number] Telephone Information:  Home Phone [phone]   Mobile [phone]       Address:  [address] [address] Email address:  No e-mail address on record      Emergency Contact(s)    Name Relationship Lgl Grd Work Phone Home Phone Mobile Phone   1. LUZ FITZGERALD* Spouse No  [phone]    2. LUZ FITZGERALD* Son No   [phone]   3. CHRIS FITZGERALD* Son No   [phone]           Primary language:  English     needed? No   [facility] [employer]:  [phone] op. 1  Other communication barriers:None    Preferred Method of Communication:     Current living arrangement: I live in a private home with family    Mobility Status/ Medical Equipment: Independent w/Device        Health Maintenance  Health Maintenance Reviewed: Due/Overdue   Health Maintenance Due   Topic Date Due    HEPATITIS C SCREENING  Never done    ZOSTER IMMUNIZATION (2 of 3) 02/21/2008    COVID-19 Vaccine (4 - Pfizer series) 04/12/2022    MEDICARE ANNUAL WELLNESS VISIT  07/13/2022          My Access Plan  Medical Emergency 911   Primary Clinic Line  - [phone]   24 Hour Appointment Line [phone] or  [phone]) (toll-free)   24 Hour Nurse Line [phone] (toll-free)   Preferred Urgent Care [facility] [phone]     [facility]  [phone]     Preferred Pharmacy [facility] [place], [place] - [address]     Behavioral Health Crisis Line The National Suicide Prevention Lifeline at 1-222.681.5928 or Text/Call 438             My Care Team Members  Patient Care Team         Relationship Specialty Notifications Start End    Kushal Motley MD PCP - General Internal Medicine  1/8/23     Phone: [phone] Fax:  [phone]         [address] [place] [address]    Kushal Motley MD Assigned PCP   6/16/21     Phone: [phone] Fax: [phone]         [address] [place] [address]    Kushal Motley MD Physician Internal Medicine  7/8/21     Phone: [phone] Fax: [phone]         [address] [place] [address]    Rianna Mariee LICSW Lead Care Coordinator  Admissions 7/27/23     Pratima Dorantes Community Health Worker  Admissions 8/1/23               My Care Plans  Self Management and Treatment Plan  Care Plan  Care Plan: Help At Home       Problem: In-home support       Goal: Establish home support       Start Date: 8/1/2023    Note:     Goal Statement: I would like to look into home support over the next two months as well as plan for potential future living arrangements.   Barriers: application timelines, Medicare Coverage  Strengths: accepting of help  Patient expressed understanding of goal: yes    Action steps to achieve this goal:  My family will review resources sent to me via email. Programs that are free to search, like realGroupsite for Assisted Living. [facility] Care: [email or web address]  Echo[employer] Connection: [email or web address]  My family will consider connecting with resources that are the best fit for me.   My family will work with my PCP and care coordination team as we work to get home care in place.   My family will continue to outreach to care coordination as needed for additional resources or supports.                                  Action Plans on File:                       Advance Care Plans/Directives Type:   Advanced Directive - On File      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 (H)      Current Medications and  Allergies:    Current Outpatient Medications   Medication    aspirin 81 mg chewable tablet    atenolol (TENORMIN) 25 MG tablet    black cohosh 540 mg cap    calcium carbonate 600 mg-vitamin D 400 units (CALTRATE) 600-400 MG-UNIT per tablet    FOLIC ACID/MULTIVITS-MIN/LUT (CENTRUM SILVER ORAL)    MV,CA,MIN/FA/HERBAL NO.158 (ESTROVEN ENERGY ORAL)    simvastatin (ZOCOR) 10 MG tablet    tiZANidine (ZANAFLEX) 4 MG tablet    vitamin A-vitamin C-vit E-min (OCUVITE) Tab tablet    vitamin D3 (CHOLECALCIFEROL) 50 mcg (2000 units) tablet     Current Facility-Administered Medications   Medication    [START ON 8/10/2023] denosumab (PROLIA) injection 60 mg     No Known Allergies     Care Coordination Start Date: 7/24/2023   Frequency of Care Coordination: monthly     Form Last Updated: 08/01/2023

## 2023-08-01 NOTE — Clinical Note
FYI enrollment complete, resources provided. Son Khris might reach out to you for help getting My Chart set up (we couldn't get it working); he has help number also.

## 2023-08-08 ENCOUNTER — TELEPHONE (OUTPATIENT)
Dept: INTERNAL MEDICINE | Facility: CLINIC | Age: 78
End: 2023-08-08
Payer: MEDICARE

## 2023-08-08 NOTE — TELEPHONE ENCOUNTER
Home Care is calling regarding an established patient with [facility].       Requesting orders from: Kushal Motley  Provider is following patient: Yes  Is this a 60-day recertification request?  No      Kassidy HARMON from [facility]   [phone], ext [phone]    Orders Requested    Physical Therapy  Request for delay in care, service is not able to be provided within same scheduled day.   Delay start of care until 8/10/23 due to staffing.       Information was gathered and will be sent to provider for review.  RN will contact Home Care with information after provider review.  Confirmed ok to leave a detailed message with call back.  Contact information confirmed and updated as needed.    Telma Castillo RN

## 2023-08-21 ENCOUNTER — MEDICAL CORRESPONDENCE (OUTPATIENT)
Dept: HEALTH INFORMATION MANAGEMENT | Facility: CLINIC | Age: 78
End: 2023-08-21

## 2023-08-21 ENCOUNTER — TELEPHONE (OUTPATIENT)
Dept: INTERNAL MEDICINE | Facility: CLINIC | Age: 78
End: 2023-08-21
Payer: MEDICARE

## 2023-08-21 NOTE — TELEPHONE ENCOUNTER
Home Care is calling regarding an established patient with [facility].     Requesting orders from: Kushal Motley  Provider is following patient: Yes  Is this a 60-day recertification request?  No    Orders Requested    Physical Therapy  Request for initial certification (first set of orders)   Frequency:  2x/wk for 2 wks  Then,  1 visit every other week for 3 visits    Occupational Therapy  Request for initial evaluation and treatment (one time)     Confirmed ok to leave a detailed message with call back.  Contact information confirmed and updated as needed.    Leta Muro RN

## 2023-08-24 NOTE — TELEPHONE ENCOUNTER
Isabelle looking for verbal orders to move OT eval to next week. RN gave verbal orders to move OT eval. No further questions

## 2023-08-25 DIAGNOSIS — I10 ESSENTIAL HYPERTENSION: ICD-10-CM

## 2023-08-25 RX ORDER — ATENOLOL 25 MG/1
TABLET ORAL
Qty: 90 TABLET | Refills: 3 | Status: SHIPPED | OUTPATIENT
Start: 2023-08-25 | End: 2024-09-23

## 2023-08-25 NOTE — TELEPHONE ENCOUNTER
"Last Written Prescription Date:  6/28/22  Last Fill Quantity: 90,  # refills: 3   Last office visit provider:  7/27/23     Requested Prescriptions   Pending Prescriptions Disp Refills    atenolol (TENORMIN) 25 MG tablet [Pharmacy Med Name: Atenolol Oral Tablet 25 MG] 90 tablet 0     Sig: TAKE ONE TABLET BY MOUTH ONE TIME DAILY       Beta-Blockers Protocol Passed - 8/25/2023  2:00 AM        Passed - Blood pressure under 140/90 in past 12 months     BP Readings from Last 3 Encounters:   07/27/23 121/70   07/23/23 126/59   06/27/23 131/56                 Passed - Patient is age 6 or older        Passed - Recent (12 mo) or future (30 days) visit within the authorizing provider's specialty     Patient has had an office visit with the authorizing provider or a provider within the authorizing providers department within the previous 12 mos or has a future within next 30 days. See \"Patient Info\" tab in inbasket, or \"Choose Columns\" in Meds & Orders section of the refill encounter.              Passed - Medication is active on med list             Yenni Guerrero RN 08/25/23 11:24 AM  "

## 2023-09-01 ENCOUNTER — MEDICAL CORRESPONDENCE (OUTPATIENT)
Dept: HEALTH INFORMATION MANAGEMENT | Facility: CLINIC | Age: 78
End: 2023-09-01

## 2023-09-07 ENCOUNTER — MEDICAL CORRESPONDENCE (OUTPATIENT)
Dept: HEALTH INFORMATION MANAGEMENT | Facility: CLINIC | Age: 78
End: 2023-09-07

## 2023-09-11 ENCOUNTER — PATIENT OUTREACH (OUTPATIENT)
Dept: CARE COORDINATION | Facility: CLINIC | Age: 78
End: 2023-09-11
Payer: MEDICARE

## 2023-09-11 NOTE — PROGRESS NOTES
Clinic Care Coordination Contact  Care Coordination Clinician Chart Review    Situation: Patient chart reviewed by Care Coordinator.       Background: Care Coordination Program started: 7/24/2023. Initial assessment completed and patient-centered care plan(s) were developed with participation from patient. Lead CC handed patient off to CHW for continued outreaches.       Assessment: Per chart review, patient outreach completed by [facility] at initial outreach on 8/1/23; no outreach since that time.  Patient is not actively working to accomplish goal(s). Patient's goal(s) appropriate and relevant at this time. Patient is not due for updated Plan of Care.  Assessments will be completed annually or as needed/with change of patient status.      Care Plan: Help At Home       Problem: In-home support       Goal: Establish home support       Start Date: 8/1/2023    Note:     Goal Statement: I would like to look into home support over the next two months as well as plan for potential future living arrangements.   Barriers: application timelines, Medicare Coverage  Strengths: accepting of help  Patient expressed understanding of goal: yes    Action steps to achieve this goal:  My family will review resources sent to me via email. Programs that are free to search, like realQbix for Assisted Living. [facility] Care: [email or web address] Pharmaceuticals.[email or web address]/  [employer] Connection: https://[employer].iCents.net/[place]/  My family will consider connecting with resources that are the best fit for me.   My family will work with my PCP and care coordination team as we work to get home care in place.   My family will continue to outreach to care coordination as needed for additional resources or supports.                                    Plan/Recommendations: The patient will continue working with Care Coordination to achieve goal(s) as above. CHW will continue outreaches at minimum every 30 days and will involve Lead CC as needed or if patient is  ready to move to Maintenance. Lead CC will continue to monitor CHW outreaches and patient's progress to goal(s) every 6 weeks.     Plan of Care updated and sent to patient: No

## 2023-09-11 NOTE — PROGRESS NOTES
Clinic Care Coordination Contact  [facility]/Voicemail       Clinical Data: CHW Outreach    Outreach attempted x 1. Briefly Spoke with Patient's Spouse (Blas)  Blas requested for CHW to call at a later time. Further indicating that CHW should contact Patient's Son (Khris) next time. Further indicating that he would be better at following up. CHW acknowledged.    Plan: CHW will make another attempt to reach the patient via phone or MyChart.    CHW outreach in the next 2 weeks.      IFTIKHAR Blair  Clinic Care Coordination   [facility]   Phone: [phone]  [email or web address]

## 2023-09-26 ENCOUNTER — PATIENT OUTREACH (OUTPATIENT)
Dept: CARE COORDINATION | Facility: CLINIC | Age: 78
End: 2023-09-26
Payer: MEDICARE

## 2023-09-26 NOTE — Clinical Note
TERESITA CHANGTCB with son to call back regarding assisted living resources Patient was not able to talk per

## 2023-09-26 NOTE — PROGRESS NOTES
9/26/2023  Clinic Care Coordination Contact  Community Health Worker Follow Up    Intervention and Education during outreach:   Called and spoke to patient's  stated she is not able to talk and to talk her son Lui about it.  Called and left VM for patient's son Lui ( co-health agent) to call CHW back updating resources for assisted living facility.    Routed to CHW as TERESITA    CHW follow up in 10 business days:2nd attempt 10-10-23    Quita Porter  Community Health Worker (Covering for IFTIKHAR Espinoza )  [facility]  Clinic Care Coordination  [email or web address]   Office: [phone]  Fax: [phone]

## 2023-10-08 DIAGNOSIS — E78.5 HYPERLIPEMIA: ICD-10-CM

## 2023-10-09 RX ORDER — SIMVASTATIN 10 MG
10 TABLET ORAL
Qty: 90 TABLET | Refills: 0 | Status: SHIPPED | OUTPATIENT
Start: 2023-10-09 | End: 2024-01-10

## 2023-10-16 ENCOUNTER — PATIENT OUTREACH (OUTPATIENT)
Dept: CARE COORDINATION | Facility: CLINIC | Age: 78
End: 2023-10-16
Payer: MEDICARE

## 2023-10-16 NOTE — PROGRESS NOTES
Clinic Care Coordination Contact  [facility]/Voicemail    Clinical Data: Care Coordinator Outreach  Outreach attempted x 1.  Left message on  patients ganesh Pinedo  voicelindsey with call back information and requested return call.    Plan: Care Coordinator will try to reach patient again in 10 business days.    ** CHW contact for CCC is patients ganesh Pinedo @ [phone].    Pratima [facility] Health Worker  [facility] Care Coordination   [facility], [place], [place] [facility], [facility]  Office: [phone]

## 2023-10-19 ENCOUNTER — APPOINTMENT (OUTPATIENT)
Dept: RADIOLOGY | Facility: CLINIC | Age: 78
End: 2023-10-19
Attending: EMERGENCY MEDICINE
Payer: MEDICARE

## 2023-10-19 ENCOUNTER — PATIENT OUTREACH (OUTPATIENT)
Dept: CARE COORDINATION | Facility: CLINIC | Age: 78
End: 2023-10-19
Payer: MEDICARE

## 2023-10-19 ENCOUNTER — NURSE TRIAGE (OUTPATIENT)
Dept: NURSING | Facility: CLINIC | Age: 78
End: 2023-10-19
Payer: MEDICARE

## 2023-10-19 LAB
BASO+EOS+MONOS # BLD AUTO: ABNORMAL 10*3/UL
BASO+EOS+MONOS NFR BLD AUTO: ABNORMAL %
BASOPHILS # BLD AUTO: 0 10E3/UL (ref 0–0.2)
BASOPHILS NFR BLD AUTO: 0 %
EOSINOPHIL # BLD AUTO: 0 10E3/UL (ref 0–0.7)
EOSINOPHIL NFR BLD AUTO: 0 %
ERYTHROCYTE [DISTWIDTH] IN BLOOD BY AUTOMATED COUNT: 13.3 % (ref 10–15)
HCT VFR BLD AUTO: 38.9 % (ref 35–47)
HGB BLD-MCNC: 12.8 G/DL (ref 11.7–15.7)
IMM GRANULOCYTES # BLD: 0 10E3/UL
IMM GRANULOCYTES NFR BLD: 0 %
LYMPHOCYTES # BLD AUTO: 0.8 10E3/UL (ref 0.8–5.3)
LYMPHOCYTES NFR BLD AUTO: 6 %
MCH RBC QN AUTO: 31.4 PG (ref 26.5–33)
MCHC RBC AUTO-ENTMCNC: 32.9 G/DL (ref 31.5–36.5)
MCV RBC AUTO: 96 FL (ref 78–100)
MONOCYTES # BLD AUTO: 0.9 10E3/UL (ref 0–1.3)
MONOCYTES NFR BLD AUTO: 6 %
NEUTROPHILS # BLD AUTO: 11.8 10E3/UL (ref 1.6–8.3)
NEUTROPHILS NFR BLD AUTO: 88 %
NRBC # BLD AUTO: 0 10E3/UL
NRBC BLD AUTO-RTO: 0 /100
PLATELET # BLD AUTO: 242 10E3/UL (ref 150–450)
RBC # BLD AUTO: 4.07 10E6/UL (ref 3.8–5.2)
WBC # BLD AUTO: 13.5 10E3/UL (ref 4–11)

## 2023-10-19 PROCEDURE — 83690 ASSAY OF LIPASE: CPT | Performed by: EMERGENCY MEDICINE

## 2023-10-19 PROCEDURE — 99285 EMERGENCY DEPT VISIT HI MDM: CPT | Mod: 25

## 2023-10-19 PROCEDURE — 93005 ELECTROCARDIOGRAM TRACING: CPT | Performed by: EMERGENCY MEDICINE

## 2023-10-19 PROCEDURE — 84484 ASSAY OF TROPONIN QUANT: CPT | Performed by: EMERGENCY MEDICINE

## 2023-10-19 PROCEDURE — 80053 COMPREHEN METABOLIC PANEL: CPT | Performed by: EMERGENCY MEDICINE

## 2023-10-19 PROCEDURE — 36415 COLL VENOUS BLD VENIPUNCTURE: CPT | Performed by: EMERGENCY MEDICINE

## 2023-10-19 PROCEDURE — 71045 X-RAY EXAM CHEST 1 VIEW: CPT

## 2023-10-19 PROCEDURE — 83880 ASSAY OF NATRIURETIC PEPTIDE: CPT | Performed by: EMERGENCY MEDICINE

## 2023-10-19 PROCEDURE — 85025 COMPLETE CBC W/AUTO DIFF WBC: CPT | Performed by: EMERGENCY MEDICINE

## 2023-10-19 NOTE — PROGRESS NOTES
Clinic Care Coordination Contact  Care Coordination Clinician Chart Review    Situation: Patient chart reviewed by Care Coordinator.       Background: Care Coordination Program started: 7/24/2023. Initial assessment completed and patient-centered care plan(s) were developed with participation from patient. Lead CC handed patient off to CHW for continued outreaches.       Assessment: Per chart review, patient outreach completed by CC CHW on 10/16/23.  Patient is not actively working to accomplish goal(s). Patient's goal(s) appropriate and relevant at this time. Patient is due for updated Plan of Care.  Assessments will be completed annually or as needed/with change of patient status.      Care Plan: Help At Home       Problem: In-home support       Goal: Establish home support       Start Date: 8/1/2023 Expected End Date: 11/30/2023    This Visit's Progress: 20%    Note:     Goal Statement: I would like to look into home support over the next two months as well as plan for potential future living arrangements.   Barriers: application timelines, Medicare Coverage  Strengths: accepting of help  Patient expressed understanding of goal: yes    Action steps to achieve this goal:  My family will review resources sent to me via email. Programs that are free to search, like realtors for Assisted Living. [facility] Care: [email or web address] [email or web address]/  [employer] Connection: [email or web address]  My family will consider connecting with resources that are the best fit for me.   My family will work with my PCP and care coordination team as we work to get home care in place.   My family will continue to outreach to care coordination as needed for additional resources or supports.                                    Plan/Recommendations: The patient will continue working with Care Coordination to achieve goal(s) as above. CHW will continue outreaches at minimum every 30 days and will involve Lead CC as needed or if  patient is ready to move to Maintenance. Lead CC will continue to monitor CHW outreaches and patient's progress to goal(s) every 6 weeks.     Plan of Care updated and sent to patient: Yes, via mail

## 2023-10-19 NOTE — LETTER
[facility] CARE COORDINATION  [address] [address]   October 19, 2023        Zahra BILL Amilcar  [address] [address]          Dear Zahra,     Xin is an updated Patient Centered Plan of Care for your continued enrollment in Care Coordination. Please let us know if you have additional questions, concerns, or goals that we can assist with.    Sincerely,    Rianna Mariee [facility] Clinical Care Coordinator  [facility], [facility], and [facility]  Patient Centered Plan of Care  About Me:        Patient Name:  Zahra Fitzgerald    YOB: 1945  Age:         78 year old   [place] MRN:    [id number] Telephone Information:  Home Phone [phone]   Mobile [phone]       Address:  [address] [address] Email address:  No e-mail address on record      Emergency Contact(s)    Name Relationship Lgl Grd Work Phone Home Phone Mobile Phone   1. LUZ FITZGERALD* Spouse No  [phone]    2. LUZ FITZGERALD* Son No   [phone]   3. CHRIS FITZGERALD* Son No   [phone]           Primary language:  English     needed? No   [facility] [employer]:  [phone] op. 1  Other communication barriers:None    Preferred Method of Communication:     Current living arrangement: I live in a private home with family    Mobility Status/ Medical Equipment: Independent w/Device        Health Maintenance  Health Maintenance Reviewed: Due/Overdue   Health Maintenance Due   Topic Date Due    HEPATITIS C SCREENING  Never done    RSV VACCINE 60+ (1 - 1-dose 60+ series) Never done    ZOSTER IMMUNIZATION (2 of 3) 02/21/2008    MEDICARE ANNUAL WELLNESS VISIT  07/13/2022    INFLUENZA VACCINE (1) 09/01/2023    COVID-19 Vaccine (4 - 2023-24 season) 09/01/2023          My Access Plan  Medical Emergency 911   Primary Clinic Line  - [phone]   24 Hour Appointment Line  [phone] or  [phone]) (toll-free)   24 Hour Nurse Line [phone] (toll-free)   Preferred Urgent Care [facility], [phone]     [facility]  [phone]     Preferred Pharmacy [id number] [place], [place] - [address] Crisis Line The National Suicide Prevention Lifeline at 1-209.991.3963 or Text/Call 988             My Care Team Members  Patient Care Team         Relationship Specialty Notifications Start End    Kushal Motley MD PCP - General Internal Medicine  1/8/23     Phone: [phone] Fax: [phone]         [address] [place] [address]    Kushal Motley MD Assigned PCP   6/16/21     Phone: [phone] Fax: [phone]         [address] [place] [address]    Kushal Motley MD Physician Internal Medicine  7/8/21     Phone: [phone] Fax: [phone]         [address] [place] [address]    Rianna Mariee LICSW Lead Care Coordinator  Admissions 7/27/23     Pratima Dorantes Community Health Worker  Admissions 8/1/23     Jefry Julian DO Physician Neurology  8/4/23     Phone: [phone] Fax: [phone]         [address] [address]              My Care Plans  Self Management and Treatment Plan  Care Plan  Care Plan: Help At Home       Problem: In-home support       Goal: Establish home support       Start Date: 8/1/2023 Expected End Date: 11/30/2023    This Visit's Progress: 20%    Note:     Goal Statement: I would like to look into home support over the next two months as well as plan for potential future living arrangements.   Barriers: application timelines, Medicare Coverage  Strengths: accepting of help  Patient expressed understanding of goal: yes    Action steps to achieve this goal:  My family will review resources sent to me via email. Programs that are free to search, like realtors for  Assisted Living. [facility] Care: [email or web address]  [facility] Connection: [email or web address]  My family will consider connecting with resources that are the best fit for me.   My family will work with my PCP and care coordination team as we work to get home care in place.   My family will continue to outreach to care coordination as needed for additional resources or supports.                                        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 (H)      Current Medications and Allergies:    Current Outpatient Medications   Medication    aspirin 81 mg chewable tablet    atenolol (TENORMIN) 25 MG tablet    black cohosh 540 mg cap    calcium carbonate 600 mg-vitamin D 400 units (CALTRATE) 600-400 MG-UNIT per tablet    FOLIC ACID/MULTIVITS-MIN/LUT (CENTRUM SILVER ORAL)    MV,CA,MIN/FA/HERBAL NO.158 (ESTROVEN ENERGY ORAL)    simvastatin (ZOCOR) 10 MG tablet    tiZANidine (ZANAFLEX) 4 MG tablet    vitamin A-vitamin C-vit E-min (OCUVITE) Tab tablet    vitamin D3 (CHOLECALCIFEROL) 50 mcg (2000 units) tablet     Current Facility-Administered Medications   Medication    denosumab (PROLIA) injection 60 mg     No Known Allergies     Care Coordination Start Date: 7/24/2023   Frequency of Care Coordination: monthly     Form Last Updated: 10/19/2023

## 2023-10-19 NOTE — TELEPHONE ENCOUNTER
"Disposition is to be seen within four hours.    OTC Dulcolax available     Nurse Triage SBAR    Is this a 2nd Level Triage? YES, LICENSED PRACTITIONER REVIEW IS REQUIRED    Situation: Patient spouse reporting patient stomach/abdominal pain today with constipation and won't drink many fluids/water.  She will drink root beer.    Background: Patient with a history of dementia and constipation, with prior enemas in the ER.  Patient on a stool softener.    Assessment: Patient hasn't had a bowl movement in two-three days.  Reports pain pretty constant since this morning.  Caller says he is \"pretty sure\" it's a constipation issue and she needs an enema.  Denies vomiting.  Caller says he would like her to try a Dulcolax oral tablet to see if that helps.    Protocol Recommended Disposition:   See HCP Within 4 Hours (Or PCP Triage)    Recommendation: Does patient need to be seen in the ER?  Can she try the OTC Dulcolax tablet?     Paged to provider    Paged Dr. Thomas on call via smart web, who advised patient can try the dulcolax tablet first.  If this produces a bowel movement that relieves pain, she can stay home and monitor.  If she still has pain, then she should go to the ED.    Provider Recommendation Follow Up:   Reached patient/caregiver. Informed of provider's recommendations. Patient verbalized understanding and agrees with the plan.     Rafaela Solomon RN  [facility]    Does the patient meet one of the following criteria for ADS visit consideration? 16+ years old, with an [facility] PCP     TIP  Providers, please consider if this condition is appropriate for management at one of our Acute and Diagnostic Services sites.     If patient is a good candidate, please use dotphrase <dot>triageresponse and select Refer to ADS to document.          Reason for Disposition   [1] Constant abdominal pain AND [2] present > 2 hours    Additional Information   Negative: [1] Vomiting AND [2] contains bile (green color)   " Negative: Patient sounds very sick or weak to the triager   Negative: [1] Vomiting AND [2] abdomen looks much more swollen than usual    Protocols used: Constipation-A-AH

## 2023-10-20 ENCOUNTER — HOSPITAL ENCOUNTER (OUTPATIENT)
Facility: CLINIC | Age: 78
Setting detail: OBSERVATION
Discharge: HOME | End: 2023-10-21
Attending: EMERGENCY MEDICINE | Admitting: INTERNAL MEDICINE
Payer: MEDICARE

## 2023-10-20 ENCOUNTER — APPOINTMENT (OUTPATIENT)
Dept: PHYSICAL THERAPY | Facility: CLINIC | Age: 78
End: 2023-10-20
Attending: STUDENT IN AN ORGANIZED HEALTH CARE EDUCATION/TRAINING PROGRAM
Payer: MEDICARE

## 2023-10-20 ENCOUNTER — APPOINTMENT (OUTPATIENT)
Dept: CT IMAGING | Facility: CLINIC | Age: 78
End: 2023-10-20
Attending: EMERGENCY MEDICINE
Payer: MEDICARE

## 2023-10-20 DIAGNOSIS — F02.C3 SEVERE LATE ONSET ALZHEIMER'S DEMENTIA WITH MOOD DISTURBANCE (H): Primary | ICD-10-CM

## 2023-10-20 DIAGNOSIS — G30.1 SEVERE LATE ONSET ALZHEIMER'S DEMENTIA WITH MOOD DISTURBANCE (H): Primary | ICD-10-CM

## 2023-10-20 DIAGNOSIS — R60.0 PERIPHERAL EDEMA: ICD-10-CM

## 2023-10-20 DIAGNOSIS — R53.1 WEAKNESS GENERALIZED: ICD-10-CM

## 2023-10-20 DIAGNOSIS — K57.32 DIVERTICULITIS OF COLON: ICD-10-CM

## 2023-10-20 LAB
ALBUMIN SERPL BCG-MCNC: 3.8 G/DL (ref 3.5–5.2)
ALBUMIN SERPL BCG-MCNC: 4.1 G/DL (ref 3.5–5.2)
ALP SERPL-CCNC: 70 U/L (ref 35–104)
ALP SERPL-CCNC: 77 U/L (ref 35–104)
ALT SERPL W P-5'-P-CCNC: 12 U/L (ref 0–50)
ALT SERPL W P-5'-P-CCNC: 8 U/L (ref 0–50)
ANION GAP SERPL CALCULATED.3IONS-SCNC: 13 MMOL/L (ref 7–15)
ANION GAP SERPL CALCULATED.3IONS-SCNC: 8 MMOL/L (ref 7–15)
AST SERPL W P-5'-P-CCNC: 21 U/L (ref 0–45)
AST SERPL W P-5'-P-CCNC: 26 U/L (ref 0–45)
ATRIAL RATE - MUSE: 80 BPM
BASO+EOS+MONOS # BLD AUTO: NORMAL 10*3/UL
BASO+EOS+MONOS NFR BLD AUTO: NORMAL %
BASOPHILS # BLD AUTO: 0 10E3/UL (ref 0–0.2)
BASOPHILS NFR BLD AUTO: 0 %
BILIRUB SERPL-MCNC: 0.5 MG/DL
BILIRUB SERPL-MCNC: 0.8 MG/DL
BUN SERPL-MCNC: 15.5 MG/DL (ref 8–23)
BUN SERPL-MCNC: 17.8 MG/DL (ref 8–23)
CALCIUM SERPL-MCNC: 8.9 MG/DL (ref 8.8–10.2)
CALCIUM SERPL-MCNC: 9.3 MG/DL (ref 8.8–10.2)
CHLORIDE SERPL-SCNC: 100 MMOL/L (ref 98–107)
CHLORIDE SERPL-SCNC: 102 MMOL/L (ref 98–107)
CREAT SERPL-MCNC: 0.81 MG/DL (ref 0.51–0.95)
CREAT SERPL-MCNC: 0.89 MG/DL (ref 0.51–0.95)
DEPRECATED HCO3 PLAS-SCNC: 26 MMOL/L (ref 22–29)
DEPRECATED HCO3 PLAS-SCNC: 28 MMOL/L (ref 22–29)
DIASTOLIC BLOOD PRESSURE - MUSE: 57 MMHG
EGFRCR SERPLBLD CKD-EPI 2021: 66 ML/MIN/1.73M2
EGFRCR SERPLBLD CKD-EPI 2021: 74 ML/MIN/1.73M2
EOSINOPHIL # BLD AUTO: 0 10E3/UL (ref 0–0.7)
EOSINOPHIL NFR BLD AUTO: 0 %
ERYTHROCYTE [DISTWIDTH] IN BLOOD BY AUTOMATED COUNT: 13.5 % (ref 10–15)
GLUCOSE SERPL-MCNC: 105 MG/DL (ref 70–99)
GLUCOSE SERPL-MCNC: 133 MG/DL (ref 70–99)
HCT VFR BLD AUTO: 36.5 % (ref 35–47)
HGB BLD-MCNC: 12 G/DL (ref 11.7–15.7)
IMM GRANULOCYTES # BLD: 0 10E3/UL
IMM GRANULOCYTES NFR BLD: 0 %
INTERPRETATION ECG - MUSE: NORMAL
LIPASE SERPL-CCNC: 33 U/L (ref 13–60)
LYMPHOCYTES # BLD AUTO: 1.6 10E3/UL (ref 0.8–5.3)
LYMPHOCYTES NFR BLD AUTO: 16 %
MCH RBC QN AUTO: 31.4 PG (ref 26.5–33)
MCHC RBC AUTO-ENTMCNC: 32.9 G/DL (ref 31.5–36.5)
MCV RBC AUTO: 96 FL (ref 78–100)
MONOCYTES # BLD AUTO: 1 10E3/UL (ref 0–1.3)
MONOCYTES NFR BLD AUTO: 10 %
NEUTROPHILS # BLD AUTO: 7 10E3/UL (ref 1.6–8.3)
NEUTROPHILS NFR BLD AUTO: 74 %
NRBC # BLD AUTO: 0 10E3/UL
NRBC BLD AUTO-RTO: 0 /100
NT-PROBNP SERPL-MCNC: 401 PG/ML (ref 0–1800)
P AXIS - MUSE: 37 DEGREES
PLATELET # BLD AUTO: 213 10E3/UL (ref 150–450)
POTASSIUM SERPL-SCNC: 4 MMOL/L (ref 3.4–5.3)
POTASSIUM SERPL-SCNC: 4.4 MMOL/L (ref 3.4–5.3)
PR INTERVAL - MUSE: 160 MS
PROT SERPL-MCNC: 6.9 G/DL (ref 6.4–8.3)
PROT SERPL-MCNC: 7.4 G/DL (ref 6.4–8.3)
QRS DURATION - MUSE: 76 MS
QT - MUSE: 396 MS
QTC - MUSE: 456 MS
R AXIS - MUSE: 69 DEGREES
RBC # BLD AUTO: 3.82 10E6/UL (ref 3.8–5.2)
SARS-COV-2 RNA RESP QL NAA+PROBE: NEGATIVE
SODIUM SERPL-SCNC: 138 MMOL/L (ref 135–145)
SODIUM SERPL-SCNC: 139 MMOL/L (ref 135–145)
SYSTOLIC BLOOD PRESSURE - MUSE: 119 MMHG
T AXIS - MUSE: 36 DEGREES
TROPONIN T SERPL HS-MCNC: 14 NG/L
VENTRICULAR RATE- MUSE: 80 BPM
WBC # BLD AUTO: 9.6 10E3/UL (ref 4–11)

## 2023-10-20 PROCEDURE — G0378 HOSPITAL OBSERVATION PER HR: HCPCS

## 2023-10-20 PROCEDURE — 250N000013 HC RX MED GY IP 250 OP 250 PS 637: Performed by: INTERNAL MEDICINE

## 2023-10-20 PROCEDURE — 250N000011 HC RX IP 250 OP 636: Mod: JZ | Performed by: INTERNAL MEDICINE

## 2023-10-20 PROCEDURE — 97162 PT EVAL MOD COMPLEX 30 MIN: CPT | Mod: GP

## 2023-10-20 PROCEDURE — 85025 COMPLETE CBC W/AUTO DIFF WBC: CPT | Performed by: INTERNAL MEDICINE

## 2023-10-20 PROCEDURE — 96376 TX/PRO/DX INJ SAME DRUG ADON: CPT

## 2023-10-20 PROCEDURE — 250N000011 HC RX IP 250 OP 636: Mod: JZ | Performed by: EMERGENCY MEDICINE

## 2023-10-20 PROCEDURE — 87635 SARS-COV-2 COVID-19 AMP PRB: CPT | Performed by: EMERGENCY MEDICINE

## 2023-10-20 PROCEDURE — 258N000003 HC RX IP 258 OP 636: Performed by: INTERNAL MEDICINE

## 2023-10-20 PROCEDURE — 97116 GAIT TRAINING THERAPY: CPT | Mod: GP

## 2023-10-20 PROCEDURE — 80053 COMPREHEN METABOLIC PANEL: CPT | Performed by: INTERNAL MEDICINE

## 2023-10-20 PROCEDURE — 99418 PROLNG IP/OBS E/M EA 15 MIN: CPT | Performed by: INTERNAL MEDICINE

## 2023-10-20 PROCEDURE — 99207 PR APP CREDIT; MD BILLING SHARED VISIT: CPT | Performed by: STUDENT IN AN ORGANIZED HEALTH CARE EDUCATION/TRAINING PROGRAM

## 2023-10-20 PROCEDURE — 96365 THER/PROPH/DIAG IV INF INIT: CPT

## 2023-10-20 PROCEDURE — 96361 HYDRATE IV INFUSION ADD-ON: CPT

## 2023-10-20 PROCEDURE — 74176 CT ABD & PELVIS W/O CONTRAST: CPT | Mod: MA

## 2023-10-20 PROCEDURE — 99223 1ST HOSP IP/OBS HIGH 75: CPT | Mod: AI | Performed by: INTERNAL MEDICINE

## 2023-10-20 PROCEDURE — 36415 COLL VENOUS BLD VENIPUNCTURE: CPT | Performed by: INTERNAL MEDICINE

## 2023-10-20 PROCEDURE — 250N000013 HC RX MED GY IP 250 OP 250 PS 637: Performed by: STUDENT IN AN ORGANIZED HEALTH CARE EDUCATION/TRAINING PROGRAM

## 2023-10-20 RX ORDER — ACETAMINOPHEN 650 MG/1
650 SUPPOSITORY RECTAL EVERY 6 HOURS PRN
Status: DISCONTINUED | OUTPATIENT
Start: 2023-10-20 | End: 2023-10-21 | Stop reason: HOSPADM

## 2023-10-20 RX ORDER — SODIUM CHLORIDE 9 MG/ML
INJECTION, SOLUTION INTRAVENOUS CONTINUOUS
Status: DISCONTINUED | OUTPATIENT
Start: 2023-10-20 | End: 2023-10-20

## 2023-10-20 RX ORDER — ACETAMINOPHEN 325 MG/1
650 TABLET ORAL EVERY 4 HOURS PRN
Status: DISCONTINUED | OUTPATIENT
Start: 2023-10-20 | End: 2023-10-21 | Stop reason: HOSPADM

## 2023-10-20 RX ORDER — PIPERACILLIN SODIUM, TAZOBACTAM SODIUM 3; .375 G/15ML; G/15ML
3.38 INJECTION, POWDER, LYOPHILIZED, FOR SOLUTION INTRAVENOUS EVERY 8 HOURS
Status: DISCONTINUED | OUTPATIENT
Start: 2023-10-20 | End: 2023-10-21 | Stop reason: HOSPADM

## 2023-10-20 RX ORDER — ONDANSETRON 2 MG/ML
4 INJECTION INTRAMUSCULAR; INTRAVENOUS EVERY 6 HOURS PRN
Status: DISCONTINUED | OUTPATIENT
Start: 2023-10-20 | End: 2023-10-21 | Stop reason: HOSPADM

## 2023-10-20 RX ORDER — ONDANSETRON 4 MG/1
4 TABLET, ORALLY DISINTEGRATING ORAL EVERY 6 HOURS PRN
Status: DISCONTINUED | OUTPATIENT
Start: 2023-10-20 | End: 2023-10-21 | Stop reason: HOSPADM

## 2023-10-20 RX ORDER — PIPERACILLIN SODIUM, TAZOBACTAM SODIUM 3; .375 G/15ML; G/15ML
3.38 INJECTION, POWDER, LYOPHILIZED, FOR SOLUTION INTRAVENOUS ONCE
Status: COMPLETED | OUTPATIENT
Start: 2023-10-20 | End: 2023-10-20

## 2023-10-20 RX ORDER — SIMVASTATIN 10 MG
10 TABLET ORAL AT BEDTIME
Status: DISCONTINUED | OUTPATIENT
Start: 2023-10-20 | End: 2023-10-21 | Stop reason: HOSPADM

## 2023-10-20 RX ADMIN — PIPERACILLIN AND TAZOBACTAM 3.38 G: 3; .375 INJECTION, POWDER, LYOPHILIZED, FOR SOLUTION INTRAVENOUS at 09:40

## 2023-10-20 RX ADMIN — PIPERACILLIN AND TAZOBACTAM 3.38 G: 3; .375 INJECTION, POWDER, LYOPHILIZED, FOR SOLUTION INTRAVENOUS at 18:01

## 2023-10-20 RX ADMIN — SIMVASTATIN 10 MG: 10 TABLET, FILM COATED ORAL at 21:39

## 2023-10-20 RX ADMIN — QUETIAPINE FUMARATE 12.5 MG: 25 TABLET ORAL at 05:43

## 2023-10-20 RX ADMIN — PIPERACILLIN AND TAZOBACTAM 3.38 G: 3; .375 INJECTION, POWDER, LYOPHILIZED, FOR SOLUTION INTRAVENOUS at 02:25

## 2023-10-20 RX ADMIN — SODIUM CHLORIDE: 9 INJECTION, SOLUTION INTRAVENOUS at 03:23

## 2023-10-20 ASSESSMENT — ACTIVITIES OF DAILY LIVING (ADL)
ADLS_ACUITY_SCORE: 35
ADLS_ACUITY_SCORE: 38
ADLS_ACUITY_SCORE: 46
ADLS_ACUITY_SCORE: 38
ADLS_ACUITY_SCORE: 42
ADLS_ACUITY_SCORE: 38
ADLS_ACUITY_SCORE: 42
ADLS_ACUITY_SCORE: 38
ADLS_ACUITY_SCORE: 38
ADLS_ACUITY_SCORE: 42
ADLS_ACUITY_SCORE: 42
DEPENDENT_IADLS:: CLEANING;COOKING;LAUNDRY;SHOPPING;MEAL PREPARATION;MEDICATION MANAGEMENT;TRANSPORTATION;MONEY MANAGEMENT;INCONTINENCE
ADLS_ACUITY_SCORE: 35

## 2023-10-20 NOTE — CONSULTS
Care Management Initial Consult    General Information  Assessment completed with: Spouse or significant other, Children,  Blas & Khris  Type of CM/SW Visit: Initial Assessment    Primary Care Provider verified and updated as needed:     Readmission within the last 30 days: no previous admission in last 30 days      Reason for Consult: discharge planning  Advance Care Planning:            Communication Assessment  Patient's communication style: spoken language (English or Bilingual)    Hearing Difficulty or Deaf: yes   Wear Glasses or Blind: yes    Cognitive  Cognitive/Neuro/Behavioral: .WDL except  Level of Consciousness: confused  Arousal Level: opens eyes spontaneously  Orientation: disoriented to, disoriented x 4 (knows name but not birthday)  Mood/Behavior: calm, cooperative  Best Language: 0 - No aphasia  Speech: illogical, spontaneous    Living Environment:   People in home: spouse, child(hector), adult   Blas and son Blas CORONA  Current living Arrangements: house      Able to return to prior arrangements: yes       Family/Social Support:  Care provided by: spouse/significant other, child(hector)  Provides care for: no one, unable/limited ability to care for self  Marital Status:              Description of Support System:           Current Resources:   Patient receiving home care services: No     Community Resources: None  Equipment currently used at home:    Supplies currently used at home: Incontinence Supplies    Employment/Financial:  Employment Status: retired        Financial Concerns: none           Does the patient's insurance plan have a 3 day qualifying hospital stay waiver?  Yes     Which insurance plan 3 day waiver is available? ACO REACH    Will the waiver be used for post-acute placement? Undetermined at this time    Lifestyle & Psychosocial Needs:  Social Determinants of Health     Food Insecurity: Not on file   Depression: Not at risk (7/27/2023)    PHQ-2     PHQ-2 Score: 0    Housing Stability: Not on file   Tobacco Use: Low Risk  (7/27/2023)    Patient History     Smoking Tobacco Use: Never     Smokeless Tobacco Use: Never     Passive Exposure: Not on file   Financial Resource Strain: Not on file   Alcohol Use: Not on file   Transportation Needs: Not on file   Physical Activity: Not on file   Interpersonal Safety: Not on file   Stress: Not on file   Social Connections: Not on file       Functional Status:  Prior to admission patient needed assistance:   Dependent ADLs:: Bathing, Dressing, Grooming, Incontinence, Toileting  Dependent IADLs:: Cleaning, Cooking, Laundry, Shopping, Meal Preparation, Medication Management, Transportation, Money Management, Incontinence       Mental Health Status:  Mental Health Status:  (Dementia)       Chemical Dependency Status:  Chemical Dependency Status: No Current Concerns             Values/Beliefs:  Spiritual, Cultural Beliefs, Voodoo Practices, Values that affect care: no               Additional Information:  Assessed via pt's  Blas and son Khris d/t pt's confusion and diagnosis of dementia.  Pt resides in a house with  Blas and son Blas Wadsworth.  Pt uses a walker, requires assistance for all I/ADL's.  No DME use, no homecare services.  Family's discharge goal: return home, family transport.    10:18 AM  Discussed Observation status with pt's  Blas Fitzgerald and son Khris Fitzgerald.  DIEHL document signed by  Blas; copy given to Blas and original placed in pt's paper chart.    CM will continue to follow care progression and aide in discharge planning as needed.       Judith Carballo RN

## 2023-10-20 NOTE — PROGRESS NOTES
[facility]    Medicine Progress Note - Hospitalist Service    Date of Admission:  10/20/2023    Assessment & Plan   Ms. Fitzgerald is a 78 years old woman with past medical history significant for osteoporosis, hypertension, questionable dementia who was brought to the hospital on 10/19 due to reported abdominal pain. Currently being managed for acute uncomplicated diverticulitis.    Acute diverticulitis  -Patient is unable to provide any history due to reported advanced dementia  -Attempted to call her family multiple times, they did not reply.  -At this point, no abdominal pain on physical lamination.  -Leukocytosis resolved.    Plan  -Enteric viruses and bacteria  -Monitor for any diarrhea and check C. difficile if patient is having more than 3 bowel movement today  -Continue Zosyn for now.    Hypertension  -At home on atenolol 25 mg daily  -Blood pressures currently around 110/60    Plan  -Hold atenolol    Severe late onset Alzheimer's dementia with mood disturbances  History of normal pressure hydrocephalus  -Per prior documentation, patient lives with son and  at home.  -Appears to have advanced dementia.  Oriented x0.  -High risk for delirium.    Plan  -Continue Seroquel as needed.  -PT/OT    Hyperlipidemia    Plan  -Continue statin       Observation Goals:   Diet: Combination Diet Low Saturated Fat Na <2400mg Diet, No Caffeine Diet    DVT Prophylaxis: Pneumatic Compression Devices  Conroy Catheter: Not present  Lines: None     Cardiac Monitoring: None  Code Status: Full Code        Disposition Plan       Expected Discharge Date: 10/21/2023      Destination: home with family              SANTI LARES MD  Hospitalist Service  [facility]  Securely message with [employer] (more info)  Text page via [employer] Paging/Directory   ______________________________________________________________________    Interval History   Severe dementia.  Unable to provide any  history.  Denies any chest pain or abdominal pain.    Physical Exam   Vital Signs: Temp: 97.4  F (36.3  C) Temp src: Oral BP: 116/63 Pulse: 75   Resp: 18 SpO2: 97 % O2 Device: None (Room air)    Weight: 148 lbs 4.8 oz    Physical Exam  Constitutional:       General: She is not in acute distress.  Cardiovascular:      Rate and Rhythm: Normal rate.   Pulmonary:      Effort: Pulmonary effort is normal. No respiratory distress.   Musculoskeletal:      Right lower leg: Edema present.      Left lower leg: Edema present.   Neurological:      Mental Status: She is alert. She is disoriented and confused.          Medical Decision Making       60 MINUTES SPENT BY ME on the date of service doing chart review, history, exam, documentation & further activities per the note.      Data     I have personally reviewed the following data over the past 24 hrs:    9.6  \   12.0   / 213     138 102 15.5 /  105 (H)   4.0 28 0.89 \     ALT: 8 AST: 21 AP: 70 TBILI: 0.8   ALB: 3.8 TOT PROTEIN: 6.9 LIPASE: 33     Trop: 14 BNP: 401       Imaging results reviewed over the past 24 hrs:   Recent Results (from the past 24 hour(s))   XR Chest 1 View    Narrative    EXAM: XR CHEST 1 VIEW  LOCATION: [facility]  DATE: 10/19/2023    INDICATION: Weakness  COMPARISON: 01/08/2023      Impression    IMPRESSION: Negative chest. A calcified granuloma again seen in the left lung base. A disc-like metallic foreign body projects over the lateral left chest wall, measuring 15 x 4 mm, new from prior study and indeterminate. Please correlate clinically for   any external objects.   CT Abdomen Pelvis w/o Contrast    Narrative    EXAM: CT ABDOMEN PELVIS W/O CONTRAST  LOCATION: [facility]  DATE: 10/20/2023    INDICATION: abdominal pain  COMPARISON: 04/18/2023  TECHNIQUE: CT scan of the abdomen and pelvis was performed without IV contrast. Multiplanar reformats were obtained. Dose reduction techniques were  used.  CONTRAST: None.    FINDINGS:   LOWER CHEST: Left lower lobe calcified granuloma.    HEPATOBILIARY: No significant mass or bile duct dilatation. No calcified gallstones.     PANCREAS: Normal.    SPLEEN: Calcified granulomas.    ADRENAL GLANDS: Normal.    KIDNEYS/BLADDER: No significant mass, stones, or hydronephrosis. There are simple or benign cysts. No follow up is needed.    BOWEL: Colonic diverticulosis. Short segment wall thickening and pericolonic stranding about the mid descending colon consistent with acute diverticulitis. No extraluminal gas. No fluid collection. No bowel obstruction.    LYMPH NODES: Normal.    VASCULATURE: Unremarkable.    PELVIC ORGANS: Normal.    MUSCULOSKELETAL: Degenerative changes of the spine. Minimal anterolisthesis L4 on L5 and L5 on S1 unchanged.      Impression    IMPRESSION:   1.  Acute uncomplicated diverticulitis of the descending colon.

## 2023-10-20 NOTE — H&P
[facility] MEDICINE ADMISSION HISTORY AND PHYSICAL       Assessment & Plan      1. Abdominal pain -- CT showed -- Acute uncomplicated diverticulitis of the descending colon.    - Zosyn to continue  - Pain medications  - CBC/CMP    2. Concerns for acute delirium -- she was pleasant when in the ED, did OK while on the floor -- until she started pulling out her IV, quite restless, yelling and agitated. Intermittently redirectable.     She has history of dementia  She also has CT evidence of normal pressure hydrocephalus (Ventriculomegaly disproportionate to the degree of volume loss. Correlate for normal pressure hydrocephalus)    - delirium order set   - seroquel PRN  - sitter for safety     3. HTN  - PTA medications       VTE prophylaxis --  ambulate  Diet --  Cardiac diet,  Code Status -- Full,  Barriers to discharge -- Admitting/Acute medical condition/s  Discharge Disposition and goals --  Unable to determine at this point, pending progress/treatment response.     PPE - I was wearing PPE including but not limited to - N95 mask, Gloves, and/or Safety glasses.  Admission Status -- Observation    Care plan is based on available information and patient's condition at encounter. This was discussed with the patient/family member and agreed to proceed. They were made aware that care plan may change.     I recommend to review/revise history/care plan for information/results not available during my encounter. All or some home medication/s were not resumed or modified on admission due to safety concerns.     75 minutes spent by me on the date of service doing chart review, history, exam, diagnostic test results interpretation, documentation & further activities per the note.      Alfonso Reed MD, MPH, FACP, [facility]  Internal Medicine - Hospitalist        Chief Complaint Abdominal pain      HISTORY     - Patient is in ED - E. Met his son too. Patient looked comfortable and denies any CP or SOB. No  belly pain.     - Per son - has stomach/abdominal pain today with constipation. Tried dulcolax.     - ED course -  Acute uncomplicated diverticulitis of the descending colon. Was given Zosyn.     - ROS --- No headache. No dizziness. No weakness. No CP or SOB. No palpitations. No abdominal pain. No nausea or vomiting. No urinary symptoms. No bleeding symptoms. No weight loss. Rest of 12 point ROS was reviewed and negative.       Past Medical History     No past medical history on file.      Surgical History     Past Surgical History:   Procedure Laterality Date    [facility] APPENDECTOMY      Description: Appendectomy;  Recorded: 03/12/2009;        Family History      Family History   Problem Relation Age of Onset    Breast Cancer Mother         Unsure of age    Breast Cancer Maternal Aunt 65.00    Breast Cancer Maternal Aunt 65.00    Breast Cancer Sister 42.00         Social History      .  Social History     Socioeconomic History    Marital status:      Spouse name: Not on file    Number of children: Not on file    Years of education: Not on file    Highest education level: Not on file   Occupational History    Not on file   Tobacco Use    Smoking status: Never    Smokeless tobacco: Never   Substance and Sexual Activity    Alcohol use: Not on file    Drug use: Not on file    Sexual activity: Not on file   Other Topics Concern    Not on file   Social History Narrative    Not on file     Social Determinants of Health     Financial Resource Strain: Not on file   Food Insecurity: Not on file   Transportation Needs: Not on file   Physical Activity: Not on file   Stress: Not on file   Social Connections: Not on file   Interpersonal Safety: Not on file   Housing Stability: Not on file          Allergies      No Known Allergies      Prior to Admission Medications      denosumab (PROLIA) injection 60 mg    aspirin 81 mg chewable tablet, [ASPIRIN 81 MG CHEWABLE TABLET] Chew 81 mg daily.  atenolol (TENORMIN) 25 MG tablet,  TAKE ONE TABLET BY MOUTH ONE TIME DAILY  black cohosh 540 mg cap, Take 1 capsule by mouth daily (Patient not taking: Reported on 7/27/2023)  calcium carbonate 600 mg-vitamin D 400 units (CALTRATE) 600-400 MG-UNIT per tablet, Take 1 tablet by mouth daily  FOLIC ACID/MULTIVITS-MIN/LUT (CENTRUM SILVER ORAL), Take 1 tablet by mouth daily  MV,CA,MIN/FA/HERBAL NO.158 (ESTROVEN ENERGY ORAL), Take 1 tablet by mouth daily  simvastatin (ZOCOR) 10 MG tablet, TAKE ONE TABLET BY MOUTH AT BEDTIME  tiZANidine (ZANAFLEX) 4 MG tablet, Take 1 tablet (4 mg) by mouth 3 times daily as needed for muscle spasms  vitamin A-vitamin C-vit E-min (OCUVITE) Tab tablet, [VITAMIN A-VITAMIN C-VIT E-MIN (OCUVITE) TAB TABLET] Take 2 tablets by mouth daily.  vitamin D3 (CHOLECALCIFEROL) 50 mcg (2000 units) tablet, Take 1 tablet by mouth daily            Review of Systems     A 12 point comprehensive review of systems was negative except as noted above in HPI.    PHYSICAL EXAMINATION       Vitals      Vitals: /57   Pulse 81   Temp 98  F (36.7  C) (Oral)   Resp 18   SpO2 97%   BMI= There is no height or weight on file to calculate BMI.      Examination     General Appearance:  Alert, cooperative, no distress  Head:    Normocephalic, without obvious abnormality, atraumatic  EENT:  PERRL, conjunctiva/corneas clear, EOM's intact.   Neck:   Supple, symmetrical, trachea midline, no adenopathy; no NVE  Back:  Symmetric, no curvature, no CVA tenderness  Chest/Lungs: Clear to auscultation bilaterally, respirations unlabored, No tenderness or deformity. No abdominal breathing or use of accessory muscles.   Heart:    Regular rate and rhythm, S1 and S2 normal, no murmur, rub   or gallop  Abdomen: Soft, non-tender, bowel sounds active all four quadrants, not peritoneal on palpation. Not distended  Extremities:  Extremities normal, atraumatic, no swelling   Skin:  Skin color, texture, turgor normal, no rashes or lesion  Neurologic:  Awake and alert,  No  lateralizing or localizing sogns        Pertinent Lab     Results for orders placed or performed during the hospital encounter of 10/20/23   XR Chest 1 View    Impression    IMPRESSION: Negative chest. A calcified granuloma again seen in the left lung base. A disc-like metallic foreign body projects over the lateral left chest wall, measuring 15 x 4 mm, new from prior study and indeterminate. Please correlate clinically for   any external objects.   CT Abdomen Pelvis w/o Contrast    Impression    IMPRESSION:   1.  Acute uncomplicated diverticulitis of the descending colon.     Comprehensive metabolic panel   Result Value Ref Range    Sodium 139 135 - 145 mmol/L    Potassium 4.4 3.4 - 5.3 mmol/L    Carbon Dioxide (CO2) 26 22 - 29 mmol/L    Anion Gap 13 7 - 15 mmol/L    Urea Nitrogen 17.8 8.0 - 23.0 mg/dL    Creatinine 0.81 0.51 - 0.95 mg/dL    GFR Estimate 74 >60 mL/min/1.73m2    Calcium 9.3 8.8 - 10.2 mg/dL    Chloride 100 98 - 107 mmol/L    Glucose 133 (H) 70 - 99 mg/dL    Alkaline Phosphatase 77 35 - 104 U/L    AST 26 0 - 45 U/L    ALT 12 0 - 50 U/L    Protein Total 7.4 6.4 - 8.3 g/dL    Albumin 4.1 3.5 - 5.2 g/dL    Bilirubin Total 0.5 <=1.2 mg/dL   Result Value Ref Range    Lipase 33 13 - 60 U/L   Troponin T, High Sensitivity (now)   Result Value Ref Range    Troponin T, High Sensitivity 14 <=14 ng/L   CBC with platelets and differential   Result Value Ref Range    WBC Count 13.5 (H) 4.0 - 11.0 10e3/uL    RBC Count 4.07 3.80 - 5.20 10e6/uL    Hemoglobin 12.8 11.7 - 15.7 g/dL    Hematocrit 38.9 35.0 - 47.0 %    MCV 96 78 - 100 fL    MCH 31.4 26.5 - 33.0 pg    MCHC 32.9 31.5 - 36.5 g/dL    RDW 13.3 10.0 - 15.0 %    Platelet Count 242 150 - 450 10e3/uL    % Neutrophils 88 %    % Lymphocytes 6 %    % Monocytes 6 %    Mids % (Monos, Eos, Basos)      % Eosinophils 0 %    % Basophils 0 %    % Immature Granulocytes 0 %    NRBCs per 100 WBC 0 <1 /100    Absolute Neutrophils 11.8 (H) 1.6 - 8.3 10e3/uL    Absolute  Lymphocytes 0.8 0.8 - 5.3 10e3/uL    Absolute Monocytes 0.9 0.0 - 1.3 10e3/uL    Mids Abs (Monos, Eos, Basos)      Absolute Eosinophils 0.0 0.0 - 0.7 10e3/uL    Absolute Basophils 0.0 0.0 - 0.2 10e3/uL    Absolute Immature Granulocytes 0.0 <=0.4 10e3/uL    Absolute NRBCs 0.0 10e3/uL   N terminal pro BNP outpatient   Result Value Ref Range    N Terminal Pro BNP Outpatient 401 0 - 1,800 pg/mL           Pertinent Radiology

## 2023-10-20 NOTE — ED TRIAGE NOTES
Patient arrives to the ER with c/o increased lethargy and weakness that started this morning. Per family, they don't believe she has had a bowel movement in the past couple of days. Patient has history of dementia.     Triage Assessment (Adult)       Row Name 10/19/23 1706          Triage Assessment    Airway WDL WDL        Respiratory WDL    Respiratory WDL WDL        Skin Circulation/Temperature WDL    Skin Circulation/Temperature WDL WDL        Cardiac WDL    Cardiac WDL WDL        Peripheral/Neurovascular WDL    Peripheral Neurovascular WDL WDL        Cognitive/Neuro/Behavioral WDL    Cognitive/Neuro/Behavioral WDL WDL

## 2023-10-20 NOTE — PROGRESS NOTES
"PRIMARY DIAGNOSIS: \"GENERIC\" NURSING  OUTPATIENT/OBSERVATION GOALS TO BE MET BEFORE DISCHARGE:  ADLs back to baseline: Yes    Activity and level of assistance: Assist of one, walker + GB    Pain status: Pain free.    Return to near baseline physical activity: Yes     Pt arrived to floor a little after 0300. Disoriented x4, pleasantly confused. Can obey commands although has difficulty understanding them. Vitally stable, mildly hypertensive. IV abx continued, IV fluids initiated. Unable to collect UA overnight. Denying pain.     Around 0400 AM, pt began getting restless and set off her bed alarm multiple times, pulled out her IV as well. 1:1 sitter ordered, pt redirected and PRN Seroquel given with some success. Fluids discontinued due to increased swelling in the BLEs.   "

## 2023-10-20 NOTE — PROGRESS NOTES
"PRIMARY DIAGNOSIS: \"GENERIC\" NURSING  OUTPATIENT/OBSERVATION GOALS TO BE MET BEFORE DISCHARGE:  ADLs back to baseline: No    Activity and level of assistance: Up with standby assistance.    Pain status: Improved-controlled with oral pain medications.    Return to near baseline physical activity: No     Discharge Planner Nurse   Safe discharge environment identified: Yes  Barriers to discharge: Yes       Entered by: Rosi Leong RN 10/20/2023 4:10 PM     Please review provider order for any additional goals.   Nurse to notify provider when observation goals have been met and patient is ready for discharge.  "

## 2023-10-20 NOTE — PROGRESS NOTES
"PRIMARY DIAGNOSIS: \"GENERIC\" NURSING  OUTPATIENT/OBSERVATION GOALS TO BE MET BEFORE DISCHARGE:  ADLs back to baseline: No    Activity and level of assistance: Up with standby assistance.    Pain status: Improved-controlled with oral pain medications.    Return to near baseline physical activity: No     Discharge Planner Nurse   Safe discharge environment identified: Yes  Barriers to discharge: Yes       Entered by: Rosi Leong RN 10/20/2023 4:08 PM     Please review provider order for any additional goals.   Nurse to notify provider when observation goals have been met and patient is ready for discharge.  "

## 2023-10-20 NOTE — PHARMACY-ADMISSION MEDICATION HISTORY
Pharmacist Admission Medication History    Admission medication history is complete. The information provided in this note is only as accurate as the sources available at the time of the update.    Information Source(s): Patient, Clinic records, and CareEverywhere/SureScripts via in-person    Pertinent Information: Patient's son (Khris) was able to provide current medications and last known administration times.    Changes made to PTA medication list:  Added: None  Deleted: Estroven energy, Ocuvite, and Vitamin D  Changed: None    Medication Affordability:       Allergies reviewed with patient and updates made in EHR: yes    Medication History Completed By: Uriah Forte [facility] 10/20/2023 9:40 AM    Current Facility-Administered Medications for the 10/20/23 encounter (Hospital Encounter)   Medication    denosumab (PROLIA) injection 60 mg     PTA Med List   Medication Sig Last Dose    aspirin 81 mg chewable tablet Take 81 mg by mouth every evening 10/18/2023 at pm    atenolol (TENORMIN) 25 MG tablet TAKE ONE TABLET BY MOUTH ONE TIME DAILY 10/19/2023 at 1200    black cohosh 540 mg cap Take 1 capsule by mouth every evening 10/18/2023 at pm    calcium carbonate 600 mg-vitamin D 400 units (CALTRATE) 600-400 MG-UNIT per tablet Take 1 tablet by mouth daily 10/19/2023 at 1200    FOLIC ACID/MULTIVITS-MIN/LUT (CENTRUM SILVER ORAL) Take 1 tablet by mouth daily 10/19/2023 at 1200    simvastatin (ZOCOR) 10 MG tablet TAKE ONE TABLET BY MOUTH AT BEDTIME 10/18/2023 at pm    tiZANidine (ZANAFLEX) 4 MG tablet Take 1 tablet (4 mg) by mouth 3 times daily as needed for muscle spasms PRN

## 2023-10-20 NOTE — PROGRESS NOTES
10/20/23 1400   Appointment Info   Signing Clinician's Name / Credentials (PT) Lilli Kraus, PT, DPT   Living Environment   People in Home spouse   Current Living Arrangements house   Home Accessibility stairs to enter home   Number of Stairs, Main Entrance 2   Stair Railings, Main Entrance railing on left side (ascending)   Self-Care   Usual Activity Tolerance moderate   Current Activity Tolerance fair   Equipment Currently Used at Home walker, rolling   Fall history within last six months yes   Number of times patient has fallen within last six months 3   Activity/Exercise/Self-Care Comment pt and spouse state she rarely uses FWW at home   General Information   Onset of Illness/Injury or Date of Surgery 10/20/23   Referring Physician Amilcar Bacon MD   Pertinent History of Current Problem (include personal factors and/or comorbidities that impact the POC) diverticulitis of colon, Alzheimer's   Existing Precautions/Restrictions no known precautions/restrictions   Weight-Bearing Status - LLE full weight-bearing   Weight-Bearing Status - RLE full weight-bearing   Range of Motion (ROM)   ROM Comment WFL   Strength (Manual Muscle Testing)   Strength Comments generalized weakness   Bed Mobility   Comment, (Bed Mobility) in chair   Transfers   Transfers sit-stand transfer   Sit-Stand Transfer   Sit-Stand [place] (Transfers) minimum assist (75% patient effort);verbal cues   Assistive Device (Sit-Stand Transfers) walker, front-wheeled   Gait/Stairs (Locomotion)   [place] Level (Gait) minimum assist (75% patient effort);verbal cues   Assistive Device (Gait) walker, front-wheeled   Distance in Feet (Gait) 10'   Pattern (Gait) step-to;step-through   Clinical Impression   Criteria for Skilled Therapeutic Intervention Yes, treatment indicated   PT Diagnosis (PT) impaired functional mobility   Influenced by the following impairments weakness, impaired balance   Functional limitations due to impairments gait,  "stairs, transfers   Clinical Presentation (PT Evaluation Complexity) evolving   Clinical Presentation Rationale pt presents as medically diagnosed   Clinical Decision Making (Complexity) moderate complexity   Planned Therapy Interventions (PT) balance training;bed mobility training;gait training;home exercise program;patient/family education;stair training;ROM (range of motion);strengthening;transfer training   Risk & Benefits of therapy have been explained care plan/treatment goals reviewed;patient   PT Total Evaluation Time   PT Eval, Moderate Complexity Minutes (21383) 14   Physical Therapy Goals   PT Frequency Daily   PT Predicted Duration/Target Date for Goal Attainment 10/26/23   PT Goals Transfers;Gait;Stairs   PT: Transfers Supervision/stand-by assist;Sit to/from stand;Assistive device   PT: Gait Supervision/stand-by assist;Rolling walker;50 feet   PT: Stairs 2 stairs;Minimal assist;Rail on left   Interventions   Interventions Quick Adds Gait Training;Therapeutic Activity   Therapeutic Activity   Treatment Detail/Skilled Intervention sit to/from stand, cueing for hand placement, requires assist for safe hand positioning, frequently re-directed and attention to task, sit to/from stand with min A x1, standing balance initially with mod A x1 due to posterior lean x2 min, cueing for antreior weight shift with walker, pt improves to CGA with cueing   Gait Training   Gait Training Minutes (92311) 26   Symptoms Noted During/After Treatment (Gait Training) none   Treatment Detail/Skilled Intervention increased time for attention to task and re-direction, cueing for posture, safety, educated on using the walker at all times, pt states \"all the time is too much for me\", assist required for FWW advancement and navigation   Distance in Feet 45'   [place] Level (Gait Training) minimum assist (75% patient effort)   Physical Assistance Level (Gait Training) 1 person + 1 person to manage equipment  (chair follow) "   Weight Bearing (Gait Training) full weight-bearing   Assistive Device (Gait Training) rolling walker   Pattern Analysis (Gait Training) swing-to gait   Gait Analysis Deviations decreased jenni;decreased step length;decreased stride length   Impairments (Gait Analysis/Training) balance impaired   PT Discharge Planning   PT Plan gait, 2 stairs with rail, HEP/balance   PT Discharge Recommendation (DC Rec) (S)  Transitional Care Facility   PT Rationale for DC Rec having frequent, re-current falls at home, pt requires min A for transitions and use of FWW at all times currently, pt and spouse state she does not remember/declines FWW use at home   PT Brief overview of current status ambulating 45' with Min A x1 and chair follow   PT Equipment Needed at Discharge walker, rolling   Total Session Time   Timed Code Treatment Minutes 26   Total Session Time (sum of timed and untimed services) 40

## 2023-10-20 NOTE — PROGRESS NOTES
"PRIMARY DIAGNOSIS: \"GENERIC\" NURSING  OUTPATIENT/OBSERVATION GOALS TO BE MET BEFORE DISCHARGE:  ADLs back to baseline: No    Activity and level of assistance: Up with standby assistance.    Pain status: Improved-controlled with oral pain medications.    Return to near baseline physical activity: No     Discharge Planner Nurse   Safe discharge environment identified: Yes  Barriers to discharge: Yes       Entered by: Rosi Leong RN 10/20/2023 4:09 PM     Please review provider order for any additional goals.   Nurse to notify provider when observation goals have been met and patient is ready for discharge.  "

## 2023-10-20 NOTE — PROGRESS NOTES
Pt has been feeling increased weakness and lethargy, CT found diverticulitis, first dose of abx started, admitted for further observation and treatment, all labs sent except UA, son at bedside, report given to Ruthie pt going to room 232

## 2023-10-21 ENCOUNTER — APPOINTMENT (OUTPATIENT)
Dept: OCCUPATIONAL THERAPY | Facility: CLINIC | Age: 78
End: 2023-10-21
Attending: STUDENT IN AN ORGANIZED HEALTH CARE EDUCATION/TRAINING PROGRAM
Payer: MEDICARE

## 2023-10-21 ENCOUNTER — TELEPHONE (OUTPATIENT)
Dept: FAMILY MEDICINE | Facility: CLINIC | Age: 78
End: 2023-10-21
Payer: MEDICARE

## 2023-10-21 ENCOUNTER — APPOINTMENT (OUTPATIENT)
Dept: PHYSICAL THERAPY | Facility: CLINIC | Age: 78
End: 2023-10-21
Payer: MEDICARE

## 2023-10-21 VITALS
DIASTOLIC BLOOD PRESSURE: 64 MMHG | WEIGHT: 148.3 LBS | OXYGEN SATURATION: 98 % | HEART RATE: 80 BPM | BODY MASS INDEX: 28.96 KG/M2 | RESPIRATION RATE: 18 BRPM | SYSTOLIC BLOOD PRESSURE: 119 MMHG | TEMPERATURE: 97.8 F

## 2023-10-21 LAB

## 2023-10-21 PROCEDURE — 96376 TX/PRO/DX INJ SAME DRUG ADON: CPT

## 2023-10-21 PROCEDURE — 99239 HOSP IP/OBS DSCHRG MGMT >30: CPT | Mod: GC | Performed by: INTERNAL MEDICINE

## 2023-10-21 PROCEDURE — 97116 GAIT TRAINING THERAPY: CPT | Mod: GP

## 2023-10-21 PROCEDURE — G0378 HOSPITAL OBSERVATION PER HR: HCPCS

## 2023-10-21 PROCEDURE — 97166 OT EVAL MOD COMPLEX 45 MIN: CPT | Mod: GO

## 2023-10-21 PROCEDURE — 97535 SELF CARE MNGMENT TRAINING: CPT | Mod: GO

## 2023-10-21 PROCEDURE — 250N000011 HC RX IP 250 OP 636: Mod: JZ | Performed by: INTERNAL MEDICINE

## 2023-10-21 RX ORDER — CIPROFLOXACIN 500 MG/1
500 TABLET, FILM COATED ORAL 2 TIMES DAILY
Qty: 20 TABLET | Refills: 0 | Status: SHIPPED | OUTPATIENT
Start: 2023-10-21 | End: 2023-10-31

## 2023-10-21 RX ORDER — METRONIDAZOLE 500 MG/1
500 TABLET ORAL 3 TIMES DAILY
Qty: 30 TABLET | Refills: 0 | Status: SHIPPED | OUTPATIENT
Start: 2023-10-21 | End: 2023-10-31

## 2023-10-21 RX ADMIN — PIPERACILLIN AND TAZOBACTAM 3.38 G: 3; .375 INJECTION, POWDER, LYOPHILIZED, FOR SOLUTION INTRAVENOUS at 08:20

## 2023-10-21 RX ADMIN — PIPERACILLIN AND TAZOBACTAM 3.38 G: 3; .375 INJECTION, POWDER, LYOPHILIZED, FOR SOLUTION INTRAVENOUS at 00:33

## 2023-10-21 ASSESSMENT — ACTIVITIES OF DAILY LIVING (ADL)
ADLS_ACUITY_SCORE: 52
ADLS_ACUITY_SCORE: 54
ADLS_ACUITY_SCORE: 52
ADLS_ACUITY_SCORE: 50
ADLS_ACUITY_SCORE: 54
ADLS_ACUITY_SCORE: 52
ADLS_ACUITY_SCORE: 52
ADLS_ACUITY_SCORE: 54
ADLS_ACUITY_SCORE: 52
IADL_COMMENTS: SPOUSE ASSIST

## 2023-10-21 NOTE — PROGRESS NOTES
MD update, stable for discharge with home care services-RN,OT/PT. Referral made to [facility]. Let updated message for pt's spouse requesting call back. Writer's call back phone number provided.

## 2023-10-21 NOTE — PROGRESS NOTES
"PRIMARY DIAGNOSIS: \"GENERIC\" NURSING  OUTPATIENT/OBSERVATION GOALS TO BE MET BEFORE DISCHARGE:  ADLs back to baseline: No    Activity and level of assistance: Up with standby assistance.    Pain status: Pain free.    Return to near baseline physical activity: No     Discharge Planner Nurse   Safe discharge environment identified: Yes  Barriers to discharge: Yes       Entered by: Ktaie Turcios RN 10/21/2023   Patient sleeping between cares, continues to be confused, IV abx administered through IV. Patient pleasant. Bed alarm on for patient safety. Incontinence care done, patient incontinent.   "

## 2023-10-21 NOTE — PROGRESS NOTES
Physical Therapy Discharge Summary    Reason for therapy discharge:    All goals and outcomes met, no further needs identified.    Progress towards therapy goal(s). See goals on Care Plan in [facility] electronic health record for goal details.  Goals met    Therapy recommendation(s):    Continued therapy is recommended.  Rationale/Recommendations:  Home PT.

## 2023-10-21 NOTE — PROGRESS NOTES
Occupational Therapy     10/21/23 1040   Appointment Info   Signing Clinician's Name / Credentials (OT) Isabelle Perez OT   Living Environment   People in Home spouse   Current Living Arrangements house   Home Accessibility stairs to enter home   Transportation Anticipated family or friend will provide   Self-Care   Usual Activity Tolerance moderate   Current Activity Tolerance moderate   Equipment Currently Used at Home walker, rolling;raised toilet seat;grab bar, tub/shower   Fall history within last six months yes   Number of times patient has fallen within last six months 3   Activity/Exercise/Self-Care Comment Ind toileting (other than occasional assist with clothing management to place a sophia pad); assist with dressing, bathing.   Instrumental Activities of Daily Living (IADL)   IADL Comments Spouse assist   General Information   Onset of Illness/Injury or Date of Surgery 10/20/23   Referring Physician Yogi Jorge MD   Patient/Family Therapy Goal Statement (OT) none stated   Additional Occupational Profile Info/Pertinent History of Current Problem diverticulitis of colon, Alzheimer's   Cognitive Status Examination   Orientation Status person   Affect/Mental Status (Cognitive) confused   Cognitive Status Comments impaired at baseline   Range of Motion Comprehensive   General Range of Motion no range of motion deficits identified  (BUE)   Strength Comprehensive (MMT)   General Manual Muscle Testing (MMT) Assessment no strength deficits identified  (BUE)   Transfers   Transfers sit-stand transfer   Sit-Stand Transfer   Sit-Stand Bibb (Transfers) minimum assist (75% patient effort)   Assistive Device (Sit-Stand Transfers) walker, front-wheeled   Activities of Daily Living   BADL Assessment/Intervention lower body dressing;toileting   Lower Body Dressing Assessment/Training   Bibb Level (Lower Body Dressing) moderate assist (50% patient effort)   Toileting   Bibb Level (Toileting) minimum  assist (75% patient effort)  (per clinical judgment)   Clinical Impression   Criteria for Skilled Therapeutic Interventions Met (OT) Yes, treatment indicated   OT Problem List-Impairments impacting ADL problems related to;mobility;cognition;strength;activity tolerance impaired   Assessment of Occupational Performance 1-3 Performance Deficits   Identified Performance Deficits dressing, toileting, transfers   Planned Therapy Interventions (OT) ADL retraining;bed mobility training;transfer training;progressive activity/exercise   Risk & Benefits of therapy have been explained evaluation/treatment results reviewed;care plan/treatment goals reviewed;patient;spouse/significant other   OT Total Evaluation Time   OT Eval, Moderate Complexity Minutes (28352) 10   OT Goals   Therapy Frequency (OT) Daily   OT Predicted Duration/Target Date for Goal Attainment 10/27/23   OT Goals Toilet Transfer/Toileting;Bed Mobility;Hygiene/Grooming   OT: Hygiene/Grooming supervision/stand-by assist   OT: Bed Mobility Supervision/stand-by assist   OT: Toilet Transfer/Toileting Supervision/stand-by assist   Interventions   Interventions Quick Adds Self-Care/Home Management   Self-Care/Home Management   Self-Care/Home Mgmt/ADL, Compensatory, Meal Prep Minutes (69357) 15   Symptoms Noted During/After Treatment (Meal Preparation/Planning Training) none   Treatment Detail/Skilled Intervention Pt met seated in chair; Pt performs sit<>stand transfers from chair and RTS with CGA; verbal cues/step by step for safe transfer technique. Fxl mob to/from bathroom with FWW, CGA; verbal cues for FWW safety and navigation. Pt performs LB dressing with Min A; cues for sequencing and attention to task. UB dressing, Min A for line management. Pt seated in chair at end of session, all needs within reach.   OT Discharge Planning   OT Plan 10/27- safety with ADLs; dressing; FWW safety for fxl mob; bed mob   OT Discharge Recommendation (DC Rec) home with assist   OT  Rationale for DC Rec Pt at/near her baseline for ADLs and fxl mob; anticipate may be able to d/c home with prior level of support from family for ADLs/IADLs.   OT Brief overview of current status CGA/Min A Adls and fxl mob   Total Session Time   Timed Code Treatment Minutes 15   Total Session Time (sum of timed and untimed services) 25     Isabelle Perez, OT 10/21/2023

## 2023-10-21 NOTE — PROGRESS NOTES
[facility]      OUTPATIENT OCCUPATIONAL THERAPY  EVALUATION  PLAN OF TREATMENT FOR OUTPATIENT REHABILITATION  (COMPLETE FOR INITIAL CLAIMS ONLY)  Patient's Last Name, First Name, M.I.  YOB: 1945  Zahra Fitzgerald                          Provider's Name  [facility] Medical Record No.  [id number]                               Onset Date:  10/20/23   Start of Care Date:  (P) 10/21/23     Type:     ___PT   _X_OT   ___SLP Medical Diagnosis:  (P) diverticulities of colon                        OT Diagnosis:      Visits from SOC:  1   _________________________________________________________________________________  Plan of Treatment/Functional Goals    Planned Interventions: ADL retraining, bed mobility training, transfer training, progressive activity/exercise   Goals: See Occupational Therapy Goals on Care Plan in [facility] electronic health record.    Therapy Frequency: Daily  Predicted Duration of Therapy Intervention: 10/27/23  _________________________________________________________________________________    I CERTIFY THE NEED FOR THESE SERVICES FURNISHED UNDER        THIS PLAN OF TREATMENT AND WHILE UNDER MY CARE .             Physician Signature               Date    X_____________________________________________________                  Certification date from: (P) 10/21/23, Certification date to: (P) 11/21/23    Referring Physician: Yogi Jorge MD            Initial Assessment        See Occupational Therapy evaluation dated (P) 10/21/23 in Epic electronic health record.      Isabelle Perez, OT10/21/2023    Willian Trivedi MD

## 2023-10-21 NOTE — PROGRESS NOTES
"PRIMARY DIAGNOSIS: \"GENERIC\" NURSING  OUTPATIENT/OBSERVATION GOALS TO BE MET BEFORE DISCHARGE:  ADLs back to baseline: No    Activity and level of assistance: Up with standby assistance.    Pain status: Pain free.    Return to near baseline physical activity: Yes     Discharge Planner Nurse   Safe discharge environment identified: Yes  Barriers to discharge: Yes       Entered by: Katie Turcios RN 10/21/2023    Patient disoriented X4, knew name  but not last name. No complaints of pain. Patient ambulated to bathroom with SBA and walker, was unable to void or have BM.   IV fluids continued.   Bed alarm on for patient safety.     "

## 2023-10-21 NOTE — PROGRESS NOTES
"PRIMARY DIAGNOSIS: \"GENERIC\" NURSING  OUTPATIENT/OBSERVATION GOALS TO BE MET BEFORE DISCHARGE:  ADLs back to baseline: No    Activity and level of assistance: Up with standby assistance.    Pain status: Improved-controlled with oral pain medications.    Return to near baseline physical activity: No     Discharge Planner Nurse   Safe discharge environment identified: No  Barriers to discharge: Yes       Entered by: Rosi Leong RN 10/21/2023 1:50 PM     Please review provider order for any additional goals.   Nurse to notify provider when observation goals have been met and patient is ready for discharge.  "

## 2023-10-21 NOTE — PROGRESS NOTES
Occupational Therapy Discharge Summary    Reason for therapy discharge:    Discharged to home with home therapy.    Progress towards therapy goal(s). See goals on Care Plan in [facility] electronic health record for goal details.  Goals partially met.  Barriers to achieving goals:   discharge from facility.    Therapy recommendation(s):    Pt is at/near baseline for ADLs. Family support as needed for ADLs/IADLs. Home PT to address safety/indep with fxl mob in home environment.    Isabelle Perez OT 10/21/2023

## 2023-10-22 NOTE — PROGRESS NOTES
Pt A&Ox4 and VSS. Discharged education given to pt and family. Family will give pt PO antibiotics to cover IV medication at night.  Pt discharged. Son and  transported pt home.

## 2023-10-22 NOTE — TELEPHONE ENCOUNTER
Patient was discharged from the hospital earlier today on antibiotics. I received a call from the lab as stool sample was positive for e-coli, the test also showed c diff however this is not confirmed (per lab staff, test not approved to confirm c diff, and report in [facility] not reflect this finding). To confirm c diff, a pcr test should be ordered and performed on a new sample.    I called the patient, was able to speak with her  (Blas) and her son (Khris). She is doing well, currently sleeping,  reports one episode of loose stool after getting back home, felt some hip pain after having dinner, then the hip pain resolved and she fell asleep. Her son reports that her stool is soft, not trevor diarrhea. I explained the lab findings to both  and son.     As patient is doing well, and given above information, no urgent need to go to the ED to get the confirmatory test done. I suggested that they reach to pcp on Monday with an update to determine if further testing is indicated. I educated both  and son about signs of dehydration, and advised to call EMS or go to ED if any signs of that or if diarrhea worsens. They both sounded understanding.    Will send a message to pcp with the above as well.    Belen Jefferson MD .......... October 21, 2023, 9:52 PM

## 2023-10-24 ENCOUNTER — TELEPHONE (OUTPATIENT)
Dept: INTERNAL MEDICINE | Facility: CLINIC | Age: 78
End: 2023-10-24
Payer: MEDICARE

## 2023-10-24 DIAGNOSIS — K52.9 COLITIS: Primary | ICD-10-CM

## 2023-10-24 NOTE — TELEPHONE ENCOUNTER
"This is the message that I received from the Hospitalist:  \"Belen Jefferson MD Visekruna, Maja, MD  Hi,  This is on call hospitalist covering for [facility],  Ms. Fitzgerald was hospitalized at [facility] for diverticulitis, discharged home 10/21 afternoon. I received a call from the lab as stool sample was positive for e-coli, the test also showed c diff however this is not confirmed (per lab staff, test not approved to confirm c diff, and report in [facility] not reflect this finding). To confirm c diff, a pcr test should be ordered and performed on a new sample.  I did not have the patient go back to the ER as she was doing well. I did speak with her son and , educated them about signs of dehydration and asked them to call you Monday to touch base regarding her symptoms and the possible need for further testing. I did tell them that I will reach out to you as well to let you know.\"    Please call the pt and ask to bring the stool sample for the C.diff. Order is placed.    "

## 2023-10-24 NOTE — TELEPHONE ENCOUNTER
Wally Fowler,   We at [facility] have received a referral to start home care services for this patient for the services of skilled nursing. We are requesting your approval to delay our start of care assessment to 10/27/23 per patient request.   Please respond to this message string with your approval. Thank you!    Shilpa Turcios -[phone]

## 2023-10-25 DIAGNOSIS — Z53.9 DIAGNOSIS NOT YET DEFINED: Primary | ICD-10-CM

## 2023-10-25 PROCEDURE — 99207 PR MD CERTIFICATION HHA PATIENT: CPT | Performed by: INTERNAL MEDICINE

## 2023-10-27 ENCOUNTER — TELEPHONE (OUTPATIENT)
Dept: INTERNAL MEDICINE | Facility: CLINIC | Age: 78
End: 2023-10-27
Payer: MEDICARE

## 2023-10-27 ENCOUNTER — MEDICAL CORRESPONDENCE (OUTPATIENT)
Dept: HEALTH INFORMATION MANAGEMENT | Facility: CLINIC | Age: 78
End: 2023-10-27

## 2023-10-27 NOTE — TELEPHONE ENCOUNTER
Call with Pamela, gave verbal home care orders per PCP.   Pamela has a couple additional items for clarification. Pamela is asking if PCP would like home care RN to collect a stool sample for Cdiff testing?   Please clarify if pt's diet, per hospital discharge, pt is to have a low fat, sodium less than 2,400mg per day and no caffeine. Family state pt has not been on a restricted diet.

## 2023-10-27 NOTE — TELEPHONE ENCOUNTER
They can collect the stool sample for C.diff. I placed the order few days ago.  She can be on the regular diet.

## 2023-10-27 NOTE — TELEPHONE ENCOUNTER
Home Care is calling regarding an established patient with [facility].       Requesting orders from: Kushal Motley  Provider is following patient: Yes  Is this a 60-day recertification request?  Yes    Orders Requested    Skilled Nursing  Request for recertification   Frequency:  Weekly for 9 weeks    Physical Therapy  Request for recertification   Frequency:  Eval and treat    Occupational Therapy  Request for recertification   Frequency:  Eval and treat      Confirmed ok to leave a detailed message with call back.  Contact information confirmed and updated as needed.    [facility] [phone]    Giselle Townsend RN       [facility] [phone]

## 2023-10-30 NOTE — TELEPHONE ENCOUNTER
Left message on secure VM relaying Dr. Motley's message to Pamela. I told her to call back with any questions, clarifications, or concerns.      KEON Toledo

## 2023-10-31 ENCOUNTER — VIRTUAL VISIT (OUTPATIENT)
Dept: INTERNAL MEDICINE | Facility: CLINIC | Age: 78
End: 2023-10-31
Payer: MEDICARE

## 2023-10-31 DIAGNOSIS — G30.1 SEVERE LATE ONSET ALZHEIMER'S DEMENTIA WITH MOOD DISTURBANCE (H): ICD-10-CM

## 2023-10-31 DIAGNOSIS — Z09 HOSPITAL DISCHARGE FOLLOW-UP: Primary | ICD-10-CM

## 2023-10-31 DIAGNOSIS — K57.32 DIVERTICULITIS OF COLON: ICD-10-CM

## 2023-10-31 DIAGNOSIS — F02.C3 SEVERE LATE ONSET ALZHEIMER'S DEMENTIA WITH MOOD DISTURBANCE (H): ICD-10-CM

## 2023-10-31 PROCEDURE — 99214 OFFICE O/P EST MOD 30 MIN: CPT | Mod: 95 | Performed by: INTERNAL MEDICINE

## 2023-10-31 RX ORDER — RESPIRATORY SYNCYTIAL VIRUS VACCINE 120MCG/0.5
0.5 KIT INTRAMUSCULAR ONCE
Qty: 1 EACH | Refills: 0 | Status: CANCELLED | OUTPATIENT
Start: 2023-10-31 | End: 2023-10-31

## 2023-10-31 NOTE — PROGRESS NOTES
Zahra is a 78 year old who is being evaluated via a billable video visit.      How would you like to obtain your AVS? MyChart  If the video visit is dropped, the invitation should be resent by: Text to cell phone: [phone]  Will anyone else be joining your video visit? No          Assessment & Plan     Hospital discharge follow-up  Discharge summary is not available in her chart.  Hospital stay 10/20/23 - 10/21/23:  1. Abdominal pain -- CT showed -- Acute uncomplicated diverticulitis of the descending colon. She was treated with Zosyn in the hospital. She completed Cipro and Flagyl at home.    Patient was discharged from the hospital on antibiotics. The hospitalist received a call from the lab as stool sample was positive for e-coli, the test also showed c diff however this is not confirmed (per lab staff, test not approved to confirm c diff, and report [facility] not reflect this finding). To confirm c diff, a pcr test should be ordered and performed on a new sample.   Order was C.diff was placed on 10/24, but the sample was not brought. Order was given to home care nurse too, but the sample was not collected.   Her son will come at get the stool container and will bring the sample.      She has dementia and her  and son were present during this video call. They both confirmed that she does not have diarrhea, no blood in the stool. They are giving her stool softener and she has 2 soft stools per day. NO fever, chills, abdominal pain, she is eating and sleeping well.        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 Correlate for normal pressure hydrocephalus)              MED REC REQUIRED  Post Medication Reconciliation Status: discharge medications reconciled, continue medications without  change  BMI:   Estimated body mass index is 28.96 kg/m  as calculated from the following:    Height as of 7/27/23: 1.524 m (5').    Weight as of 10/20/23: 67.3 kg (148 lb 4.8 oz).       Return in about 3 months (around 1/31/2024) for Follow up.     Kushal Motley MD  [facility]    Shantelle Sweeney is a 78 year old, presenting for the following health issues:  Hospital F/U (ABD pain, diverticulitis.)      HPI             Review of Systems         Objective           Vitals:  No vitals were obtained today due to virtual visit.    Physical Exam   GENERAL: Healthy, alert and no distress  EYES: Eyes grossly normal to inspection.  No discharge or erythema, or obvious scleral/conjunctival abnormalities.  RESP: No audible wheeze, cough, or visible cyanosis.  No visible retractions or increased work of breathing.    SKIN: Visible skin clear. No significant rash, abnormal pigmentation or lesions.  NEURO: Cranial nerves grossly intact.  Mentation and speech appropriate for age.  PSYCH: Mentation appears normal, affect normal/bright, judgement and insight intact, normal speech and appearance well-groomed.                Video-Visit Details    Type of service:  Video Visit   Video Start Time:  9:20 AM  Video End Time: 9:$0 AM    Originating Location (pt. Location): Home    Distant Location (provider location):  On-site  Platform used for Video Visit: Abelardo

## 2023-11-01 ENCOUNTER — LAB (OUTPATIENT)
Dept: LAB | Facility: CLINIC | Age: 78
End: 2023-11-01
Payer: MEDICARE

## 2023-11-01 DIAGNOSIS — A04.72 C. DIFFICILE COLITIS: Primary | ICD-10-CM

## 2023-11-01 DIAGNOSIS — K52.9 COLITIS: ICD-10-CM

## 2023-11-01 LAB
C DIFF GDH STL QL IA: NEGATIVE
C DIFF TOX A+B STL QL IA: NEGATIVE
C DIFF TOX B STL QL: POSITIVE

## 2023-11-01 PROCEDURE — 87324 CLOSTRIDIUM AG IA: CPT | Mod: 59

## 2023-11-01 PROCEDURE — 87493 C DIFF AMPLIFIED PROBE: CPT

## 2023-11-02 ENCOUNTER — PATIENT OUTREACH (OUTPATIENT)
Dept: CARE COORDINATION | Facility: CLINIC | Age: 78
End: 2023-11-02
Payer: MEDICARE

## 2023-11-02 ENCOUNTER — TELEPHONE (OUTPATIENT)
Dept: INTERNAL MEDICINE | Facility: CLINIC | Age: 78
End: 2023-11-02
Payer: MEDICARE

## 2023-11-02 NOTE — LETTER
[facility] CARE COORDINATION  August 1, 2023     Khris Fitzgerald  [address] [facility] [place] [address]    Dear Khris,  I am a Community Health Worker who works with Kushal Motley MD with the [facility]. I wanted to thank you for spending the time to talk with me. Below is a description of the resources and information we discussed.     Assisted Living Search Help -   Programs that are free to search, like realtors for Assisted Living  [facility]  https://HyperBees[facility] Center[facility]Bio.Digital Perception/  [facility]  [email or web address] [employer].Digital Perception/[place]/      Help in the Home:  https://www.[employer].Digital Perception/    Home health aide services: Medicare will pay for part-time or  intermittent home health aide services (like personal care), if you need  them to maintain your health or treat your illness or injury. However,  Medicare doesn t cover home health aide services unless you re also  getting skilled care. Skilled care includes:  Skilled nursing care  Physical therapy  Speech-language pathology services  Continuing occupational therapy, if you no longer need any of  the above     What isn t covered  Medicare doesn t pay for:  24-hour-a-day care at home  Meals delivered to your home  Services, like shopping, cleaning, and laundry  FDC or personal care like bathing, dressing, and using the  bathroom (when this is the only care you need)     My Chart:   My Chart Help - [phone]  Or reach out to Pratima Dorantes for assistance - [phone]    Sincerely,   Pratima Dorantes  Community Health Worker  [facility] Care Coordination   [place], [place], [place], [place] and [facility]  Office: [phone]

## 2023-11-02 NOTE — PROGRESS NOTES
Clinic Care Coordination Contact  Community Health Worker Follow Up    Care Gaps:     Health Maintenance Due   Topic Date Due    HEPATITIS C SCREENING  Never done    RSV VACCINE 60+ (1 - 1-dose 60+ series) Never done    ZOSTER IMMUNIZATION (2 of 3) 02/21/2008    MEDICARE ANNUAL WELLNESS VISIT  07/13/2022    INFLUENZA VACCINE (1) 09/01/2023    COVID-19 Vaccine (4 - 2023-24 season) 09/01/2023       Scheduled for AWV 1/30/24 with Dr. Motley      Care Plan:   Care Plan: Help At Home       Problem: In-home support       Goal: Establish home support       Start Date: 8/1/2023 Expected End Date: 11/30/2023    This Visit's Progress: 30% Recent Progress: 20%    Note:     Goal Statement: I would like to look into home support over the next two months as well as plan for potential future living arrangements.   Barriers: application timelines, Medicare Coverage  Strengths: accepting of help  Patient expressed understanding of goal: yes    Action steps to achieve this goal:  My family will review resources sent to me via email. Programs that are free to search, like realtors for Assisted Living. [facility] [employer] [facility]: https://Adviesmanager.nl.DiObex/ and [facility] Connection: https://FlxOne/[place]/ . I Catalino Douglas son let the CHW that we have started looking into Assisted Living options and have Stella name on the wait list for [facility] in [place].  My family will consider connecting with resources that are the best fit for me. CHW is sending me Khris the resources sent from [facility] along with a Help in the Home resource.   My family will work with my PCP and care coordination team as we work to get home care in place. Continuous (MB)  My family will continue to outreach to care coordination as needed for additional resources or supports. Continuous (MB)                              Intervention and Education during outreach: CHW is mailing information that was given by [facility] [email or web address] and Help in the home information  to patients son Khris. Patient is receiving Home Care currently and the family has started looking into Assisted Living options and have Stella name on the wait list for [facility] in [place]. No other needs at this time.     CHW Plan: CHW will reach out to patients son Catalino in about 1 month.    Pratima Dorantes  Community Health Worker  [facility] Care Coordination   [place], [facility], [facility]  Office: [phone]

## 2023-11-02 NOTE — TELEPHONE ENCOUNTER
11-2-23    FYI - Status Update    Who is Calling: caryl @     Update: mayra called from [facility] home care & is requesting verbal orders on:  Physical therapy verbal orders  one time a week for 2 weeks & once every other week for 3 visits  Working on:  Strength /gait /balance  With a start date of the week of 11-6    Does caller want a call/response back: Yes     Okay to leave a detailed message?: Yes at Other phone number:  [phone]

## 2023-11-03 ENCOUNTER — TELEPHONE (OUTPATIENT)
Dept: INTERNAL MEDICINE | Facility: CLINIC | Age: 78
End: 2023-11-03
Payer: MEDICARE

## 2023-11-03 NOTE — TELEPHONE ENCOUNTER
Spoke with Andrea and informed her that Dr. Motley approved requested orders. She expressed understanding - no further needs at this time.

## 2023-11-03 NOTE — TELEPHONE ENCOUNTER
Home Care is calling regarding an established patient with [facility].    Requesting orders from: Kushal Motley  Provider is following patient: Yes  Is this a 60-day recertification request?  No    Verbal Orders Requested    Physical Therapy  Request for initial certification (first set of orders)   Frequency: 1 x week for 2 weeks then once a week every other week for 3 visits.   Andrea from  calling back on request for clarification but the note did not state what clarification was needed. I repeated back the order and she stated that it was correct. Her VM is secure, you can leave a message on her secure voicemail if there are additional questions.     Confirmed ok to leave a detailed message with call back.  Contact information confirmed and updated as needed.    Andrea @ [facility] home care [phone]     Yeimi Scales RN

## 2023-11-06 ENCOUNTER — TELEPHONE (OUTPATIENT)
Dept: INTERNAL MEDICINE | Facility: CLINIC | Age: 78
End: 2023-11-06
Payer: MEDICARE

## 2023-11-06 RX ORDER — VANCOMYCIN HYDROCHLORIDE 125 MG/1
125 CAPSULE ORAL 4 TIMES DAILY
Qty: 40 CAPSULE | Refills: 0 | Status: SHIPPED | OUTPATIENT
Start: 2023-11-06 | End: 2024-02-22

## 2023-11-06 NOTE — TELEPHONE ENCOUNTER
"Pts  notified that lab PCR results were negative. Pt will not need antibiotic at this time per verbal from Juany Pham NP.     Pts stool is soft \"pudding consistency\", not loose, per .     Patient verified they understood the plan, agreed with the plan, and had no further questions.     ----- Message from Juany Pham CNP sent at 11/6/2023  2:45 PM CST -----  Please call the patient and let her know she was C diff positive, NOT negative.    I have sent in a prescription for Vancomycin 125mg four times daily x 10 days.    Follow up with Dr. Motley if diarrhea continues.   "

## 2023-11-07 ENCOUNTER — MEDICAL CORRESPONDENCE (OUTPATIENT)
Dept: HEALTH INFORMATION MANAGEMENT | Facility: CLINIC | Age: 78
End: 2023-11-07

## 2023-11-10 ENCOUNTER — MEDICAL CORRESPONDENCE (OUTPATIENT)
Dept: HEALTH INFORMATION MANAGEMENT | Facility: CLINIC | Age: 78
End: 2023-11-10

## 2023-11-10 DIAGNOSIS — Z53.9 DIAGNOSIS NOT YET DEFINED: Primary | ICD-10-CM

## 2023-11-10 PROCEDURE — G0180 MD CERTIFICATION HHA PATIENT: HCPCS | Performed by: INTERNAL MEDICINE

## 2023-11-16 ENCOUNTER — MEDICAL CORRESPONDENCE (OUTPATIENT)
Dept: HEALTH INFORMATION MANAGEMENT | Facility: CLINIC | Age: 78
End: 2023-11-16

## 2023-11-16 ENCOUNTER — TELEPHONE (OUTPATIENT)
Dept: INTERNAL MEDICINE | Facility: CLINIC | Age: 78
End: 2023-11-16
Payer: MEDICARE

## 2023-11-16 DIAGNOSIS — R60.9 DEPENDENT EDEMA: Primary | ICD-10-CM

## 2023-11-16 NOTE — TELEPHONE ENCOUNTER
S-(situation): Home care RN assessment report.     B-(background): Home care RN visit on 11/16   Pt have dementia and hypertension.    LOV 10/31/23.     A-(assessment): +3 edema on both leg, greater on R.   Pt will not remember to elevate legs per home care RN.   Requesting order for compression socks to help with leg edema.     R-(recommendations): please advise.      RN - [facility]   [phone]  Can leave a detail message.     Pay P. RN

## 2023-11-27 ENCOUNTER — PATIENT OUTREACH (OUTPATIENT)
Dept: CARE COORDINATION | Facility: CLINIC | Age: 78
End: 2023-11-27
Payer: MEDICARE

## 2023-11-27 NOTE — PROGRESS NOTES
Clinic Care Coordination Contact  Care Coordination Clinician Chart Review    Situation: Patient chart reviewed by Care Coordinator.       Background: Care Coordination Program started: 7/24/2023. Initial assessment completed and patient-centered care plan(s) were developed with participation from patient. Lead CC handed patient off to CHW for continued outreaches.       Assessment: Per chart review, patient outreach completed by CC CHW on 11/02/23.  Patient is actively working to accomplish goal(s). Patient's goal(s) appropriate and relevant at this time. Patient is not due for updated Plan of Care.  Assessments will be completed annually or as needed/with change of patient status.      Care Plan: Help At Home       Problem: In-home support       Goal: Establish home support       Start Date: 8/1/2023 Expected End Date: 11/30/2023    This Visit's Progress: 30% Recent Progress: 20%    Note:     Goal Statement: I would like to look into home support over the next two months as well as plan for potential future living arrangements.   Barriers: application timelines, Medicare Coverage  Strengths: accepting of help  Patient expressed understanding of goal: yes    Action steps to achieve this goal:  My family will review resources sent to me via email. Programs that are free to search, like realtors for Assisted Living. [facility]: [email or web address] and [facility]: [email or web address] . I Catalino Douglas son let the CHW that we have started looking into Assisted Living options and have Stella name on the wait list for [facility] in [place].  My family will consider connecting with resources that are the best fit for me. CHW is sending me Khris the resources sent from [facility] along with a Help in the Home resource.   My family will work with my PCP and care coordination team as we work to get home care in place. Continuous (MB)  My family will continue to outreach to care  coordination as needed for additional resources or supports. Continuous (MB)                                 Plan/Recommendations: The patient will continue working with Care Coordination to achieve goal(s) as above. CHW will continue outreaches at minimum every 30 days and will involve Lead CC as needed or if patient is ready to move to Maintenance. Lead CC will continue to monitor CHW outreaches and patient's progress to goal(s) every 6 weeks.     Plan of Care updated and sent to patient: No

## 2023-11-29 NOTE — DISCHARGE SUMMARY
[facility]  Hospitalist Discharge Summary      Date of Admission:  10/20/2023  Date of Discharge:  10/21/2023  5:21 PM  Discharging Provider: Yogi Jorge MD  Discharge Service: Hospitalist Service    Discharge Diagnoses         Ms. Fitzgerald is a 78 years old woman with past medical history significant for osteoporosis, hypertension, questionable dementia who was brought to the hospital on 10/19 due to reported abdominal pain. Currently being managed for acute uncomplicated diverticulitis.      10/21 :       Medically stable  Discharge home        A/p :      Acute diverticulitis  -Patient is unable to provide any history due to reported advanced dementia  -Attempted to call her family multiple times, they did not reply.  -At this point, no abdominal pain on physical lamination.  -Leukocytosis resolved.  Managed with iv zosyn    Clinically improved  Discharge on cipro + flagyl for another 10 days       Hypertension  -At home on atenolol 25 mg daily  -Blood pressures currently around 110/60     Plan  -Hold atenolol     Severe late onset Alzheimer's dementia with mood disturbances  History of normal pressure hydrocephalus  -Per prior documentation, patient lives with son and  at home.  -Appears to have advanced dementia.  Oriented x0.  -High risk for delirium.     Plan  -Continue Seroquel as needed.  -PT/OT     Hyperlipidemia     Plan  -Continue statin             Clinically Significant Risk Factors          Follow-ups Needed After Discharge   Follow-up Appointments     Follow-up and recommended labs and tests       Follow up with primary care provider, Kushal Motley, within 7 days   regarding new diagnosis.  No follow up labs or test are needed.        {Additional follow-up instructions/to-do's for PCP    : none    Unresulted Labs Ordered in the Past 30 Days of this Admission       No orders found from 9/20/2023 to 10/21/2023.        These results will be followed up by  PCP    Discharge Disposition   Discharged to home  Condition at discharge: Stable        Consultations This Hospital Stay   PHARMACY IP CONSULT  CARE MANAGEMENT / SOCIAL WORK IP CONSULT  PHYSICAL THERAPY ADULT IP CONSULT  OCCUPATIONAL THERAPY ADULT IP CONSULT    Code Status   Full Code    Time Spent on this Encounter   IYogi MD, personally saw the patient today and spent greater than 30 minutes discharging this patient.       Yogi Jorge MD  [address] [address]  Phone: [phone]  Fax: [phone]  ______________________________________________________________________    Physical Exam   Vital Signs:                    Weight: 148 lbs 4.8 oz         GENERAL: The patient is not in any acute distressed. Awake and alert.  HEENT: Nonicteric sclerae, PERRLA, EOMI. Oropharynx clear. Moist mucous membranes. Conjunctivae appear well perfused.  HEART: Regular rate and rhythm without murmurs.  LUNGS: Clear to auscultation bilaterally. No wheezing or crackles.  ABDOMEN: Soft, positive bowel sounds, nontender.  SKIN: No rash, no excessive bruising, petechiae, or purpura.  EXTREMITIES : no rashes, no swelling in legs.  NEUROLOGIC: conscious and oriented, follows commands, no obvious focal deficits.  ROS: All other systems negative          Primary Care Physician   Kushal Motley    Discharge Orders      Home Care Referral      Reason for your hospital stay    Abdominal pain     Follow-up and recommended labs and tests     Follow up with primary care provider, Kushal Motley, within 7 days regarding new diagnosis.  No follow up labs or test are needed.     Activity    Your activity upon discharge: activity as tolerated     Full Code     Diet    Follow this diet upon discharge: Orders Placed This Encounter      Combination Diet Low Saturated Fat Na <2400mg Diet, No Caffeine Diet       Significant Results and Procedures   Most Recent 3  CBC's:  Recent Labs   Lab Test 10/20/23  0746 10/19/23  2302 07/22/23  2318   WBC 9.6 13.5* 6.7   HGB 12.0 12.8 12.0   MCV 96 96 96    242 182     Most Recent 3 BMP's:  Recent Labs   Lab Test 10/20/23  0746 10/19/23  2302 07/22/23  2318    139 141   POTASSIUM 4.0 4.4 3.7   CHLORIDE 102 100 107   CO2 28 26 24   BUN 15.5 17.8 15   CR 0.89 0.81 0.89   ANIONGAP 8 13 10   GUILLE 8.9 9.3 9.1   * 133* 98     Most Recent 2 LFT's:  Recent Labs   Lab Test 10/20/23  0746 10/19/23  2302   AST 21 26   ALT 8 12   ALKPHOS 70 77   BILITOTAL 0.8 0.5     Most Recent 3 INR's:No lab results found.    Discharge Medications   Discharge Medication List as of 10/21/2023  4:40 PM        START taking these medications    Details   ciprofloxacin (CIPRO) 500 MG tablet Take 1 tablet (500 mg) by mouth 2 times daily for 10 days, Disp-20 tablet, R-0, E-Prescribe      metroNIDAZOLE (FLAGYL) 500 MG tablet Take 1 tablet (500 mg) by mouth 3 times daily for 10 days, Disp-30 tablet, R-0, E-Prescribe           CONTINUE these medications which have NOT CHANGED    Details   aspirin 81 mg chewable tablet Take 81 mg by mouth every evening, Historical      atenolol (TENORMIN) 25 MG tablet TAKE ONE TABLET BY MOUTH ONE TIME DAILY, Disp-90 tablet, R-3, E-Prescribe      black cohosh 540 mg cap Take 1 capsule by mouth every evening, Historical      calcium carbonate 600 mg-vitamin D 400 units (CALTRATE) 600-400 MG-UNIT per tablet Take 1 tablet by mouth daily, Historical      FOLIC ACID/MULTIVITS-MIN/LUT (CENTRUM SILVER ORAL) Take 1 tablet by mouth daily, Historical      simvastatin (ZOCOR) 10 MG tablet TAKE ONE TABLET BY MOUTH AT BEDTIME, Disp-90 tablet, R-0, E-PrescribePlease tell patient this is a kennedy refill. Patient is due for labs before further refills.      tiZANidine (ZANAFLEX) 4 MG tablet Take 1 tablet (4 mg) by mouth 3 times daily as needed for muscle spasms, Disp-10 tablet, R-0, E-Prescribe           Allergies   No Known Allergies

## 2023-12-03 ENCOUNTER — APPOINTMENT (OUTPATIENT)
Dept: RADIOLOGY | Facility: HOSPITAL | Age: 78
End: 2023-12-03
Attending: EMERGENCY MEDICINE
Payer: MEDICARE

## 2023-12-03 ENCOUNTER — APPOINTMENT (OUTPATIENT)
Dept: CT IMAGING | Facility: HOSPITAL | Age: 78
End: 2023-12-03
Attending: EMERGENCY MEDICINE
Payer: MEDICARE

## 2023-12-03 ENCOUNTER — HOSPITAL ENCOUNTER (EMERGENCY)
Facility: HOSPITAL | Age: 78
Discharge: HOME | End: 2023-12-03
Attending: EMERGENCY MEDICINE | Admitting: EMERGENCY MEDICINE
Payer: MEDICARE

## 2023-12-03 VITALS
HEART RATE: 78 BPM | TEMPERATURE: 97.6 F | DIASTOLIC BLOOD PRESSURE: 58 MMHG | RESPIRATION RATE: 20 BRPM | OXYGEN SATURATION: 94 % | SYSTOLIC BLOOD PRESSURE: 124 MMHG

## 2023-12-03 DIAGNOSIS — S40.011A CONTUSION OF RIGHT SHOULDER, INITIAL ENCOUNTER: ICD-10-CM
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
## 2023-12-03 DIAGNOSIS — S70.01XA CONTUSION OF RIGHT HIP AND THIGH, INITIAL ENCOUNTER: ICD-10-CM

## 2023-12-03 DIAGNOSIS — S70.11XA CONTUSION OF RIGHT HIP AND THIGH, INITIAL ENCOUNTER: ICD-10-CM

## 2023-12-03 LAB
ANION GAP SERPL CALCULATED.3IONS-SCNC: 12 MMOL/L (ref 7–15)
BASOPHILS # BLD AUTO: 0 10E3/UL (ref 0–0.2)
BASOPHILS NFR BLD AUTO: 0 %
BUN SERPL-MCNC: 24.7 MG/DL (ref 8–23)
CALCIUM SERPL-MCNC: 9.4 MG/DL (ref 8.8–10.2)
CHLORIDE SERPL-SCNC: 106 MMOL/L (ref 98–107)
CK SERPL-CCNC: 96 U/L (ref 26–192)
CREAT SERPL-MCNC: 1.07 MG/DL (ref 0.51–0.95)
DEPRECATED HCO3 PLAS-SCNC: 24 MMOL/L (ref 22–29)
EGFRCR SERPLBLD CKD-EPI 2021: 53 ML/MIN/1.73M2
EOSINOPHIL # BLD AUTO: 0.1 10E3/UL (ref 0–0.7)
EOSINOPHIL NFR BLD AUTO: 1 %
ERYTHROCYTE [DISTWIDTH] IN BLOOD BY AUTOMATED COUNT: 13.3 % (ref 10–15)
GLUCOSE SERPL-MCNC: 122 MG/DL (ref 70–99)
HCT VFR BLD AUTO: 39.6 % (ref 35–47)
HGB BLD-MCNC: 13.4 G/DL (ref 11.7–15.7)
IMM GRANULOCYTES # BLD: 0.1 10E3/UL
IMM GRANULOCYTES NFR BLD: 0 %
LYMPHOCYTES # BLD AUTO: 1 10E3/UL (ref 0.8–5.3)
LYMPHOCYTES NFR BLD AUTO: 9 %
MCH RBC QN AUTO: 31.7 PG (ref 26.5–33)
MCHC RBC AUTO-ENTMCNC: 33.8 G/DL (ref 31.5–36.5)
MCV RBC AUTO: 94 FL (ref 78–100)
MONOCYTES # BLD AUTO: 0.7 10E3/UL (ref 0–1.3)
MONOCYTES NFR BLD AUTO: 7 %
NEUTROPHILS # BLD AUTO: 9.3 10E3/UL (ref 1.6–8.3)
NEUTROPHILS NFR BLD AUTO: 83 %
NRBC # BLD AUTO: 0 10E3/UL
NRBC BLD AUTO-RTO: 0 /100
PLATELET # BLD AUTO: 266 10E3/UL (ref 150–450)
POTASSIUM SERPL-SCNC: 4.7 MMOL/L (ref 3.4–5.3)
RBC # BLD AUTO: 4.23 10E6/UL (ref 3.8–5.2)
SODIUM SERPL-SCNC: 142 MMOL/L (ref 135–145)
WBC # BLD AUTO: 11.1 10E3/UL (ref 4–11)

## 2023-12-03 PROCEDURE — 73560 X-RAY EXAM OF KNEE 1 OR 2: CPT | Mod: RT

## 2023-12-03 PROCEDURE — 73502 X-RAY EXAM HIP UNI 2-3 VIEWS: CPT

## 2023-12-03 PROCEDURE — 73610 X-RAY EXAM OF ANKLE: CPT | Mod: RT

## 2023-12-03 PROCEDURE — 85025 COMPLETE CBC W/AUTO DIFF WBC: CPT | Performed by: EMERGENCY MEDICINE

## 2023-12-03 PROCEDURE — 99285 EMERGENCY DEPT VISIT HI MDM: CPT | Mod: 25

## 2023-12-03 PROCEDURE — 72125 CT NECK SPINE W/O DYE: CPT | Mod: MA

## 2023-12-03 PROCEDURE — 73030 X-RAY EXAM OF SHOULDER: CPT | Mod: RT

## 2023-12-03 PROCEDURE — 36415 COLL VENOUS BLD VENIPUNCTURE: CPT | Performed by: EMERGENCY MEDICINE

## 2023-12-03 PROCEDURE — 250N000013 HC RX MED GY IP 250 OP 250 PS 637: Performed by: EMERGENCY MEDICINE

## 2023-12-03 PROCEDURE — 70450 CT HEAD/BRAIN W/O DYE: CPT | Mod: MA

## 2023-12-03 PROCEDURE — 80048 BASIC METABOLIC PNL TOTAL CA: CPT | Performed by: EMERGENCY MEDICINE

## 2023-12-03 PROCEDURE — 82550 ASSAY OF CK (CPK): CPT | Performed by: EMERGENCY MEDICINE

## 2023-12-03 PROCEDURE — 72131 CT LUMBAR SPINE W/O DYE: CPT | Mod: MA

## 2023-12-03 RX ORDER — ACETAMINOPHEN 325 MG/1
650 TABLET ORAL ONCE
Status: COMPLETED | OUTPATIENT
Start: 2023-12-03 | End: 2023-12-03

## 2023-12-03 RX ADMIN — ACETAMINOPHEN 650 MG: 325 TABLET ORAL at 19:05

## 2023-12-03 ASSESSMENT — ACTIVITIES OF DAILY LIVING (ADL): ADLS_ACUITY_SCORE: 35

## 2023-12-03 NOTE — ED TRIAGE NOTES
Patient was found down in kitchen sitting against kitchen table by family members. Family was able to get patient into chair. EMS was called and patient was transported here. Patient is a poor historian due to dementia, patient states she was walking without a walker. It appears patient fell on right side. C/o right hip pain, right shoulder pain, right leg pain. Patient has a contusion on right forehead.     Triage Assessment (Adult)       Row Name 12/03/23 0202          Triage Assessment    Airway WDL WDL        Respiratory WDL    Respiratory WDL WDL        Skin Circulation/Temperature WDL    Skin Circulation/Temperature WDL WDL        Cardiac WDL    Cardiac WDL WDL        Peripheral/Neurovascular WDL    Peripheral Neurovascular WDL WDL        Cognitive/Neuro/Behavioral WDL    Cognitive/Neuro/Behavioral WDL WDL

## 2023-12-03 NOTE — ED NOTES
[facility]  Expected date: 12/3/23  Expected time: 5:13 PM  Means of arrival: Ambulance  Comments:  LV; 78F mechanical fall; head and shoulder pain; no LOC or thinners

## 2023-12-03 NOTE — ED PROVIDER NOTES
EMERGENCY DEPARTMENT NOTE     Name: Zahra Fitzgerald    Age/Sex: 78 year old female   MRN: [id number]   Evaluation Date & Time:  12/3/2023  5:23 PM    PCP:    Kushal Motley   ED Provider: Marquise Cage D.O.       CHIEF COMPLAINT    Fall       DIAGNOSIS & DISPOSITION/MEDICAL DECISION MAKING     1. Fall, initial encounter    2. Contusion of right shoulder, initial encounter    3. Contusion of right hip and thigh, initial encounter        Zahra Fitzgerald is a 78 year old female with relevant past history of hypertension, hyperlipidemia, and Alzheimer's dementia who presents to the emergency department for evaluation of a fall.    Differential  diagnosis considered included but not limited to manic process including intracranial hemorrhage, skull fracture, cervical spine fracture, lumbar spine fracture, fractures of the shoulder, hip, knee or ankle    Medical Decision Making  Imaging studies including CT of the head, cervical spine, lumbar spine and x-rays of the right shoulder, right hip, right knee and right ankle are negative for fracture or traumatic findings.  Screening labs with CBC and basic metabolic profile within normal limits.  CK nonelevated.  Patient received Tylenol and was able to ambulate easily with her walker.  Patient will be discharged.  If development of pain not controlled with Tylenol or has concerning symptoms regarding head injury including decrease in mental status, vomiting will be return to the emergency department.    Interventions: Oral acetaminophen  Discharge Vital Signs:/58   Pulse 78   Temp 97.6  F (36.4  C) (Oral)   Resp 20   SpO2 94%      DISPOSITION: Home    Diagnostic studies:  Imaging:  Ankle XR, G/E 3 views, right   Final Result   IMPRESSION:. Soft tissue swelling around the ankle. No acute fracture is identified. There is normal joint spacing and alignment. The ankle mortise is congruent. Small subchondral lucency at the medial corner of the talar dome is  suspicious for an    osteochondral lesion. Plate and screw fixation at the first TMT joint. Small plantar calcaneal enthesophyte.      XR Knee Right 1/2 Views   Final Result   IMPRESSION: Normal joint spaces and alignment. No fracture. No significant knee joint effusion. Atherosclerosis.      XR Shoulder Right G/E 3 Views   Final Result   IMPRESSION: No acute fracture or malalignment. Mild glenohumeral and moderate to severe acromioclavicular joint degenerative changes. Osteopenia. Chronic lung changes. Aortic atherosclerosis.      XR Pelvis and Hip Right 2 Views   Final Result   IMPRESSION: No acute fracture or malalignment. There is normal hip joint spacing bilaterally. Mild degenerative changes of the sacroiliac joints and pubic symphysis. Severe degenerative changes of the lower lumbar spine. Osteopenia.      Lumbar spine CT w/o contrast   Final Result    IMPRESSION:   1.  No acute fracture.   2.  Diffuse bony demineralization.   3.  Degenerative changes described above.         Cervical spine CT w/o contrast   Final Result   IMPRESSION:   HEAD CT:   1.  Right forehead contusion. No finding for intracranial hemorrhage or mass.      2.  Moderate to advanced volume loss and mild presumed sequela chronic microvascular ischemic change. Lateral and third ventricular enlargement is disproportionate to degree of sulcal prominence suggesting a component of communicating hydrocephalus such    as normal pressure hydrocephalus.      CERVICAL SPINE CT:   1.  Cervical spondylosis without finding for acute fracture or posttraumatic subluxation.      Head CT w/o contrast   Final Result   IMPRESSION:   HEAD CT:   1.  Right forehead contusion. No finding for intracranial hemorrhage or mass.      2.  Moderate to advanced volume loss and mild presumed sequela chronic microvascular ischemic change. Lateral and third ventricular enlargement is disproportionate to degree of sulcal prominence suggesting a component of communicating  hydrocephalus such    as normal pressure hydrocephalus.      CERVICAL SPINE CT:   1.  Cervical spondylosis without finding for acute fracture or posttraumatic subluxation.         Lab:  Labs Ordered and Resulted from Time of ED Arrival to Time of ED Departure   BASIC METABOLIC PANEL - Abnormal       Result Value    Sodium 142      Potassium 4.7      Chloride 106      Carbon Dioxide (CO2) 24      Anion Gap 12      Urea Nitrogen 24.7 (*)     Creatinine 1.07 (*)     GFR Estimate 53 (*)     Calcium 9.4      Glucose 122 (*)    CBC WITH PLATELETS AND DIFFERENTIAL - Abnormal    WBC Count 11.1 (*)     RBC Count 4.23      Hemoglobin 13.4      Hematocrit 39.6      MCV 94      MCH 31.7      MCHC 33.8      RDW 13.3      Platelet Count 266      % Neutrophils 83      % Lymphocytes 9      % Monocytes 7      % Eosinophils 1      % Basophils 0      % Immature Granulocytes 0      NRBCs per 100 WBC 0      Absolute Neutrophils 9.3 (*)     Absolute Lymphocytes 1.0      Absolute Monocytes 0.7      Absolute Eosinophils 0.1      Absolute Basophils 0.0      Absolute Immature Granulocytes 0.1      Absolute NRBCs 0.0     CK TOTAL - Normal    CK 96                 Triage note reviewed:     History:  Supplemental history from: Documented in chart, if applicable and EMS patient's  and son  External Record(s) reviewed: Documented in chart, if applicable. and Inpatient Record: 10/20/23 admission [facility]  patient virtual visit October 31, 2023    Work Up:  Chart documentation includes differential considered and any EKGs or imaging independently interpreted by provider, where specified.  In additional to work up documented, I considered the following work up: Analysis but deferred secondary to low suspicion for urinary tract infection    External consultation:  Discussion of management with another provider: Documented in chart, if applicable    Complicating factors:  Care impacted by chronic illness: Cerebrovascular Disease,  Dementia, Hyperlipidemia, and Hypertension  Care affected by social determinants of health: N/A    Disposition considerations: Discharge. No recommendations on prescription strength medication(s). N/A.    At the conclusion of the encounter I discussed the results of all of the tests and the disposition. The questions were answered. The patient or family acknowledged understanding and was agreeable with the care plan.    TOTAL CRITICAL CARE TIME (EXCLUDING PROCEDURES): Not applicable    PROCEDURES:   None    EMERGENCY DEPARTMENT COURSE   5:30 PM I met with the patient to gather history and to perform my initial exam.  We discussed treatment options and the plan for care while in the Emergency Department.  6:25 PM I spoke with patient's  and son.     ED INTERVENTIONS     Medications   acetaminophen (TYLENOL) tablet 650 mg (650 mg Oral $Given 12/3/23 1905)       DISCHARGE MEDICATIONS        Review of your medicines        UNREVIEWED medicines. Ask your doctor about these medicines        Dose / Directions   aspirin 81 MG chewable tablet  Commonly known as: ASA      Dose: 81 mg  Take 81 mg by mouth every evening  Refills: 0     atenolol 25 MG tablet  Commonly known as: TENORMIN  Used for: Essential hypertension      TAKE ONE TABLET BY MOUTH ONE TIME DAILY  Quantity: 90 tablet  Refills: 3     calcium carbonate 600 mg-vitamin D 400 units 600-400 MG-UNIT per tablet  Commonly known as: CALTRATE      Dose: 1 tablet  Take 1 tablet by mouth daily  Refills: 0     CENTRAVITES 50 PLUS PO      Dose: 1 tablet  Take 1 tablet by mouth daily  Refills: 0     simvastatin 10 MG tablet  Commonly known as: ZOCOR  Used for: Hyperlipemia      Dose: 10 mg  TAKE ONE TABLET BY MOUTH AT BEDTIME  Quantity: 90 tablet  Refills: 0     tiZANidine 4 MG tablet  Commonly known as: ZANAFLEX      Dose: 4 mg  Take 1 tablet (4 mg) by mouth 3 times daily as needed for muscle spasms  Quantity: 10 tablet  Refills: 0     vancomycin 125 MG  capsule  Commonly known as: VANCOCIN  Used for: C. difficile colitis      Dose: 125 mg  Take 1 capsule (125 mg) by mouth 4 times daily  Quantity: 40 capsule  Refills: 0            CONTINUE these medicines which have NOT CHANGED        Dose / Directions   Black Cohosh Root 540 MG Caps      Dose: 1 capsule  Take 1 capsule by mouth every evening  Refills: 0                INFORMATION SOURCE AND LIMITATIONS    History/Exam limitations: Dementia  Patient information was obtained from: Patient, EMS, Patient's  and son  Use of : N/A    HISTORY OF PRESENT ILLNESS   Zahra Fitzgerald is a 78 year old year old female with a relevant past history of hypertension, hyperlipidemia, and Alzheimer's dementia who presents to this ED via ambulance for evaluation of a fall.    Per chart review: Patient was admitted to [facility] on 10/20/23 (1.5 months ago) for evaluation of abdominal pain and treated for acute uncomplicated diverticulitis. She was discharged in stable condition the next day with ciprofloxacin 500 mg twice daily for 10 days and metronidazole 500 mg 3x daily for 10 days.    Per EMS report, patient was found against a wall after having fallen prior to admission. She has an abrasion on her forehead. She reports no recent illnesses.    Per collateral information from patient's family, patient has not had any recent illnesses.    History from patient limited secondary to dementia.    REVIEW OF SYSTEMS:   All other systems reviewed and are negative except as noted above in HPI.    PATIENT HISTORY   No past medical history on file.  Patient Active Problem List   Diagnosis    Diverticulosis    Hyperlipemia    Hypertension    Osteoporosis    Normal pressure hydrocephalus (H)    Severe late onset Alzheimer's dementia with mood disturbance (H)    Diverticulitis of colon     Past Surgical History:   Procedure Laterality Date    [facility] APPENDECTOMY      Description: Appendectomy;  Recorded: 03/12/2009;        No Known Allergies    OUTPATIENT MEDICATIONS     Discharge Medication List as of 12/3/2023  7:48 PM         Vitals:    12/03/23 1736 12/03/23 1739   BP: (!) 142/67 124/58   Pulse: 75 78   Resp: 20    Temp: 97.6  F (36.4  C)    TempSrc: Oral    SpO2: 95% 94%       Physical Exam   Constitutional: Alert Oriented to person,  HEENT:    Head: Abrasion  right frontal forehead  Neck: Cervical spine nontender  Cardiovascular: Normal rate, regular rhythm and normal heart sounds. No crepitus or ecchymosis to chest wall with palpation.  Pulmonary/Chest: Normal effort  and breath sounds normal.   Abdominal: Soft. Bowel sounds are normal. No abdominal wall ecchymosis or tenderness.  Musculoskeletal: Normal range of motion. Thoracic spine nontender.  Tenderness to the right shoulder, appears well articulated, no tenderness to right distal humerus, elbow, and wrist. Left upper and lower extremities nontender. Tenderness to mid upper lumbar spine, right hip, right knee, right ankle, and right shoulder.   Neurological: Alert and oriented to person Normal strength.  Skin: Abrasion right frontal forehead    DIAGNOSTICS    LABORATORY FINDINGS (REVIEWED AND INTERPRETED):  Labs Ordered and Resulted from Time of ED Arrival to Time of ED Departure   BASIC METABOLIC PANEL - Abnormal       Result Value    Sodium 142      Potassium 4.7      Chloride 106      Carbon Dioxide (CO2) 24      Anion Gap 12      Urea Nitrogen 24.7 (*)     Creatinine 1.07 (*)     GFR Estimate 53 (*)     Calcium 9.4      Glucose 122 (*)    CBC WITH PLATELETS AND DIFFERENTIAL - Abnormal    WBC Count 11.1 (*)     RBC Count 4.23      Hemoglobin 13.4      Hematocrit 39.6      MCV 94      MCH 31.7      MCHC 33.8      RDW 13.3      Platelet Count 266      % Neutrophils 83      % Lymphocytes 9      % Monocytes 7      % Eosinophils 1      % Basophils 0      % Immature Granulocytes 0      NRBCs per 100 WBC 0      Absolute Neutrophils 9.3 (*)     Absolute Lymphocytes 1.0       Absolute Monocytes 0.7      Absolute Eosinophils 0.1      Absolute Basophils 0.0      Absolute Immature Granulocytes 0.1      Absolute NRBCs 0.0     CK TOTAL - Normal    CK 96           IMAGING (REVIEWED AND INTERPRETED):  Ankle XR, G/E 3 views, right   Final Result   IMPRESSION:. Soft tissue swelling around the ankle. No acute fracture is identified. There is normal joint spacing and alignment. The ankle mortise is congruent. Small subchondral lucency at the medial corner of the talar dome is suspicious for an    osteochondral lesion. Plate and screw fixation at the first TMT joint. Small plantar calcaneal enthesophyte.      XR Knee Right 1/2 Views   Final Result   IMPRESSION: Normal joint spaces and alignment. No fracture. No significant knee joint effusion. Atherosclerosis.      XR Shoulder Right G/E 3 Views   Final Result   IMPRESSION: No acute fracture or malalignment. Mild glenohumeral and moderate to severe acromioclavicular joint degenerative changes. Osteopenia. Chronic lung changes. Aortic atherosclerosis.      XR Pelvis and Hip Right 2 Views   Final Result   IMPRESSION: No acute fracture or malalignment. There is normal hip joint spacing bilaterally. Mild degenerative changes of the sacroiliac joints and pubic symphysis. Severe degenerative changes of the lower lumbar spine. Osteopenia.      Lumbar spine CT w/o contrast   Final Result    IMPRESSION:   1.  No acute fracture.   2.  Diffuse bony demineralization.   3.  Degenerative changes described above.         Cervical spine CT w/o contrast   Final Result   IMPRESSION:   HEAD CT:   1.  Right forehead contusion. No finding for intracranial hemorrhage or mass.      2.  Moderate to advanced volume loss and mild presumed sequela chronic microvascular ischemic change. Lateral and third ventricular enlargement is disproportionate to degree of sulcal prominence suggesting a component of communicating hydrocephalus such    as normal pressure hydrocephalus.       CERVICAL SPINE CT:   1.  Cervical spondylosis without finding for acute fracture or posttraumatic subluxation.      Head CT w/o contrast   Final Result   IMPRESSION:   HEAD CT:   1.  Right forehead contusion. No finding for intracranial hemorrhage or mass.      2.  Moderate to advanced volume loss and mild presumed sequela chronic microvascular ischemic change. Lateral and third ventricular enlargement is disproportionate to degree of sulcal prominence suggesting a component of communicating hydrocephalus such    as normal pressure hydrocephalus.      CERVICAL SPINE CT:   1.  Cervical spondylosis without finding for acute fracture or posttraumatic subluxation.                I, Liliana Tello, am serving as a scribe to document services personally performed by Marquise Cage D.O., based on my observation and the provider s statements to me.    I, Marquise Cage D.O., attest that Liliana Tello is acting in a scribe capacity, has observed my performance of the services and has documented them in accordance with my direction.    Marquise Cage D.O.  EMERGENCY MEDICINE   12/03/23  [facility] EMERGENCY DEPARTMENT  [address] [place] [id number]  [phone]  Dept: [phone]      Marquise Cage DO  12/03/23 2007

## 2023-12-04 ENCOUNTER — MEDICAL CORRESPONDENCE (OUTPATIENT)
Dept: HEALTH INFORMATION MANAGEMENT | Facility: CLINIC | Age: 78
End: 2023-12-04

## 2023-12-04 NOTE — DISCHARGE INSTRUCTIONS
Continue Tylenol as needed for pain.  Return to the emergency department if there are any concerns including headache, vomiting or change in mental status.

## 2023-12-06 ENCOUNTER — PATIENT OUTREACH (OUTPATIENT)
Dept: CARE COORDINATION | Facility: CLINIC | Age: 78
End: 2023-12-06
Payer: MEDICARE

## 2023-12-06 NOTE — PROGRESS NOTES
Clinic Care Coordination Contact  Community Health Worker Follow Up    Care Gaps:     Health Maintenance Due   Topic Date Due    HEPATITIS C SCREENING  Never done    RSV VACCINE (Pregnancy & 60+) (1 - 1-dose 60+ series) Never done    ZOSTER IMMUNIZATION (2 of 3) 02/21/2008    MEDICARE ANNUAL WELLNESS VISIT  07/13/2022    INFLUENZA VACCINE (1) 09/01/2023    COVID-19 Vaccine (4 - 2023-24 season) 09/01/2023       Scheduled 1/30/23 for AWV with Dr. Motley      Care Plan:   Care Plan: Help At Home       Problem: In-home support       Goal: Establish home support       Start Date: 8/1/2023 Expected End Date: 11/30/2023    This Visit's Progress: 30% Recent Progress: 30%    Note:     Goal Statement: I would like to look into home support over the next two months as well as plan for potential future living arrangements.   Barriers: application timelines, Medicare Coverage  Strengths: accepting of help  Patient expressed understanding of goal: yes    Action steps to achieve this goal:  My family will review resources sent to me via email. Programs that are free to search, like [facility] for Assisted Living. [facility] Care: https://[employer].FreeWavz/ and [employer] Connection: [email or web address]/[email or web address]/ . I Catalino Douglas son let the CHW that we have started looking into Assisted Living options and working to get Stella name on the wait list for [facility] in [place].  My family will consider connecting with resources that are the best fit for me. CHW is sending me Khris the resources sent from [facility] along with a Help in the Home resource. I will look into resources soon. I Khris will also see if Zahra has coverage for PCA services through a life insurance policy.  My family will work with my PCP and care coordination team as we work to get home care in place. Continuous (MB)  My family will continue to outreach to care coordination as needed for additional resources or supports. Continuous (MB)                               Intervention and Education during outreach: Patients son Khris will check to see if patient has a life insurance policy that may cover PCA services. Khris will work to get Stella name on the wait list at [facility].    CHW Plan: CHW will follow up with patients son Catalino in about 1 month.    Pratima Dorantes  Community Health Worker  [facility] Care Coordination   [facility]  Office: [phone]

## 2024-01-02 ENCOUNTER — PATIENT OUTREACH (OUTPATIENT)
Dept: CARE COORDINATION | Facility: CLINIC | Age: 79
End: 2024-01-02
Payer: MEDICARE

## 2024-01-02 NOTE — PROGRESS NOTES
Clinic Care Coordination Contact  Care Coordination Clinician Chart Review    Situation: Patient chart reviewed by Care Coordinator.       Background: Care Coordination Program started: 7/24/2023. Initial assessment completed and patient-centered care plan(s) were developed with participation from patient. Lead CC handed patient off to CHW for continued outreaches.       Assessment: Per chart review, patient outreach completed by CC CHW on 12/6/23.  Patient is actively working to accomplish goal(s). Patient's goal(s) appropriate and relevant at this time. Patient is due for updated Plan of Care.  Assessments will be completed annually or as needed/with change of patient status.      Care Plan: Help At Home       Problem: In-home support       Goal: Establish home support       Start Date: 8/1/2023 Expected End Date: 11/30/2023    This Visit's Progress: 30% Recent Progress: 30%    Note:     Goal Statement: I would like to look into home support over the next two months as well as plan for potential future living arrangements.   Barriers: application timelines, Medicare Coverage  Strengths: accepting of help  Patient expressed understanding of goal: yes    Action steps to achieve this goal:  My family will review resources sent to me via email. Programs that are free to search, like realtors for Assisted Living. [facility] Care: https://[employer].LIVELENZ/ and [employer] Connection: https://[employer].[email or web address]/[place]/ . I Catalino Duoglas son let the CHW that we have started looking into Assisted Living options and working to get Stella name on the wait list for [facility] in [place].  My family will consider connecting with resources that are the best fit for me. CHW is sending me Khris the resources sent from [facility] along with a Help in the Home resource. I will look into resources soon. I Khris will also see if Zahra has coverage for PCA services through a life insurance policy.  My family will work  with my PCP and care coordination team as we work to get home care in place. Continuous (MB)  My family will continue to outreach to care coordination as needed for additional resources or supports. Continuous (MB)                                 Plan/Recommendations: The patient will continue working with Care Coordination to achieve goal(s) as above. CHW will continue outreaches at minimum every 30 days and will involve Lead CC as needed or if patient is ready to move to Maintenance. Lead CC will continue to monitor CHW outreaches and patient's progress to goal(s) every 6 weeks.     Plan of Care updated and sent to patient: Yes, via mail

## 2024-01-02 NOTE — LETTER
[facility] CARE COORDINATION  [address] [address]   January 2, 2024        Zahra Fitzgerald  [address] [address]          Dear Zahra,     Attached is an updated Patient Centered Plan of Care for your continued enrollment in Care Coordination. Please let us know if you have additional questions, concerns, or goals that we can assist with.    Sincerely,    Rianna Mariee [facility] Clinical Care Coordinator  [facility], [facility], and [facility]  Patient Centered Plan of Care  About Me:        Patient Name:  Zahra Fitzgerald    YOB: 1945  Age:         78 year old   [place] MRN:    [id number] Telephone Information:  Home Phone [phone]   Mobile [phone]       Address:  [address] [address] Email address:  No e-mail address on record      Emergency Contact(s)    Name Relationship Lgl Grd Work Phone Home Phone Mobile Phone   1. LUZ FITZGERALD* Spouse No  [phone]    2. LUZ FITZGERALD* Son No   [phone]   3. CHRIS FITZGERALD* Son No   [phone]           Primary language:  English     needed? No   [facility] [employer]:  [phone] op. 1  Other communication barriers:None    Preferred Method of Communication:     Current living arrangement: I live in a private home with family    Mobility Status/ Medical Equipment: Independent w/Device        Health Maintenance  Health Maintenance Reviewed: Due/Overdue   Health Maintenance Due   Topic Date Due    HEPATITIS C SCREENING  Never done    RSV VACCINE (Pregnancy & 60+) (1 - 1-dose 60+ series) Never done    ZOSTER IMMUNIZATION (2 of 3) 02/21/2008    MEDICARE ANNUAL WELLNESS VISIT  07/13/2022    INFLUENZA VACCINE (1) 09/01/2023    COVID-19 Vaccine (4 - 2023-24 season) 09/01/2023    PHQ-2 (once per calendar year)  01/01/2024          My Access Plan  Medical Emergency 911    Primary Clinic Line [facility] - [phone]   24 Hour Appointment Line [phone] or  [phone]) (toll-free)   24 Hour Nurse Line [phone] (toll-free)   Preferred Urgent Care [facility], [phone]     Preferred Hospital [facility]  [phone]     Preferred Pharmacy [facility] [address] [place], [place] - [address] [facility]     Behavioral Health Crisis Line The National Suicide Prevention Lifeline at 1-678.450.3234 or Text/Call 988           My Care Team Members  Patient Care Team         Relationship Specialty Notifications Start End    Kushal Motley MD PCP - General Internal Medicine  1/8/23     Phone: [phone] Fax: [phone]         [address] [place] [address]    Kushal Motley MD Assigned PCP   6/16/21     Phone: [phone] Fax: [phone]         [address] [place] [address]    Kushal Motley MD Physician Internal Medicine  7/8/21     Phone: [phone] Fax: [phone]         [address] [place] [address]    Rianna Mariee LICSW Lead Care Coordinator  Admissions 7/27/23     Pratima Dorantes JR Community Health Worker  Admissions 8/1/23     Jefry Julian DO Physician Neurology  8/4/23     Phone: [phone] Fax: [phone]         [address] [facility] [address]                My Care Plans  Self Management and Treatment Plan    Care Plan  Care Plan: Help At Home       Problem: In-home support       Goal: Establish home support       Start Date: 8/1/2023 Expected End Date: 11/30/2023    This Visit's Progress: 30% Recent Progress: 30%    Note:     Goal Statement: I would like to look into home support over the next two months as well as plan for potential future living arrangements.   Barriers: application timelines, Medicare Coverage  Strengths: accepting of help  Patient expressed understanding of goal: yes    Action steps to  achieve this goal:  My family will review resources sent to me via email. Programs that are free to search, like realtors for Assisted Living. [facility] Care: https://[place] [facility] [facility].Cobra Stylet/ and [employer] Connection: https://Inter[employer]asia.Cobra [employer]let/[place]/ . I Catalino Douglas son let the CHW that we have started looking into Assisted Living options and working to get Stella name on the wait list for [facility] in [place].  My family will consider connecting with resources that are the best fit for me. CHW is sending me Khris the resources sent from [facility] along with a Help in the Home resource. I will look into resources soon. I Khris will also see if Zahra has coverage for PCA services through a life insurance policy.  My family will work with my PCP and care coordination team as we work to get home care in place. Continuous (MB)  My family will continue to outreach to care coordination as needed for additional resources or supports. 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)    Diverticulitis of colon      Current Medications and Allergies:    Current Outpatient Medications   Medication    aspirin 81 mg chewable tablet    atenolol (TENORMIN) 25 MG tablet    black cohosh 540 mg cap    calcium carbonate 600 mg-vitamin D 400 units (CALTRATE) 600-400 MG-UNIT per tablet    FOLIC ACID/MULTIVITS-MIN/LUT (CENTRUM SILVER ORAL)    simvastatin (ZOCOR) 10 MG tablet    tiZANidine (ZANAFLEX) 4 MG tablet    vancomycin (VANCOCIN) 125 MG capsule     Current Facility-Administered Medications   Medication    denosumab (PROLIA) injection 60 mg     No Known Allergies     Care Coordination Start Date: 7/24/2023   Frequency of Care Coordination: monthly, more  frequently as needed     Form Last Updated: 01/02/2024

## 2024-01-10 DIAGNOSIS — E78.5 HYPERLIPEMIA: ICD-10-CM

## 2024-01-10 RX ORDER — SIMVASTATIN 10 MG
10 TABLET ORAL
Qty: 90 TABLET | Refills: 3 | Status: SHIPPED | OUTPATIENT
Start: 2024-01-10

## 2024-01-18 ENCOUNTER — PATIENT OUTREACH (OUTPATIENT)
Dept: CARE COORDINATION | Facility: CLINIC | Age: 79
End: 2024-01-18
Payer: MEDICARE

## 2024-01-18 NOTE — PROGRESS NOTES
Clinic Care Coordination Contact  Community Health Worker Follow Up    Care Gaps:     Health Maintenance Due   Topic Date Due    HEPATITIS C SCREENING  Never done    RSV VACCINE (Pregnancy & 60+) (1 - 1-dose 60+ series) Never done    ZOSTER IMMUNIZATION (2 of 3) 02/21/2008    MEDICARE ANNUAL WELLNESS VISIT  07/13/2022    INFLUENZA VACCINE (1) 09/01/2023    COVID-19 Vaccine (4 - 2023-24 season) 09/01/2023    PHQ-2 (once per calendar year)  01/01/2024       Scheduled 1/30/24 for AWV with Dr. Ortega      Care Plan:   Care Plan: Help At Home       Problem: In-home support       Goal: Establish home support       Start Date: 8/1/2023 Expected End Date: 11/30/2023    This Visit's Progress: 30% Recent Progress: 30%    Note:     Goal Statement: I would like to look into home support over the next two months as well as plan for potential future living arrangements.   Barriers: application timelines, Medicare Coverage  Strengths: accepting of help  Patient expressed understanding of goal: yes    Action steps to achieve this goal:  My family will review resources sent to me via email. Programs that are free to search, like realtors for Assisted Living. [facility] Care: https://AccountNow.[employer] Media/ and [employer] ASA Connection: https://[employer]x Media/[place]/ . I Catalino Douglas son let the CHW that we have started looking into Assisted Living options and working to get Stella name on the wait list for [facility] in [place]. Continuous (MB)  My family will consider connecting with resources that are the best fit for me. CHW is sending me Khris the resources sent from [facility] along with a Help in the Home resource. I will look into resources soon. I Khris will also see if Zahra has coverage for PCA services through a life insurance policy. Continuous (MB)  My family will work with my PCP and care coordination team as we work to get home care in place. Continuous (MB)  My family will continue to outreach to care  coordination as needed for additional resources or supports. Continuous (MB)                              Intervention and Education during outreach: See goal for details. No other needs at this time.    CHW Plan: CHW will follow up with patients son Khris in about 1 month.     Pratima Dorantes  [facility] Health Worker  [facility] Care Coordination   [place], [place], [place], [facility]  Office: [phone]

## 2024-02-12 ENCOUNTER — PATIENT OUTREACH (OUTPATIENT)
Dept: CARE COORDINATION | Facility: CLINIC | Age: 79
End: 2024-02-12

## 2024-02-12 ENCOUNTER — HOSPITAL ENCOUNTER (EMERGENCY)
Facility: CLINIC | Age: 79
Discharge: HOME | End: 2024-02-12
Attending: EMERGENCY MEDICINE | Admitting: EMERGENCY MEDICINE
Payer: MEDICARE

## 2024-02-12 ENCOUNTER — APPOINTMENT (OUTPATIENT)
Dept: CT IMAGING | Facility: CLINIC | Age: 79
End: 2024-02-12
Attending: EMERGENCY MEDICINE
Payer: MEDICARE

## 2024-02-12 VITALS
TEMPERATURE: 97.9 F | RESPIRATION RATE: 24 BRPM | DIASTOLIC BLOOD PRESSURE: 71 MMHG | WEIGHT: 156 LBS | HEIGHT: 60 IN | BODY MASS INDEX: 30.63 KG/M2 | HEART RATE: 79 BPM | OXYGEN SATURATION: 95 % | SYSTOLIC BLOOD PRESSURE: 135 MMHG

## 2024-02-12 DIAGNOSIS — N30.00 ACUTE CYSTITIS WITHOUT HEMATURIA: ICD-10-CM

## 2024-02-12 DIAGNOSIS — R10.9 ABDOMINAL PAIN, UNSPECIFIED ABDOMINAL LOCATION: ICD-10-CM

## 2024-02-12 DIAGNOSIS — R11.0 NAUSEA: ICD-10-CM

## 2024-02-12 LAB
ALBUMIN SERPL BCG-MCNC: 4.3 G/DL (ref 3.5–5.2)
ALBUMIN UR-MCNC: NEGATIVE MG/DL
ALP SERPL-CCNC: 74 U/L (ref 40–150)
ALT SERPL W P-5'-P-CCNC: 10 U/L (ref 0–50)
ANION GAP SERPL CALCULATED.3IONS-SCNC: 9 MMOL/L (ref 7–15)
APPEARANCE UR: CLEAR
AST SERPL W P-5'-P-CCNC: 22 U/L (ref 0–45)
BACTERIA #/AREA URNS HPF: ABNORMAL /HPF
BASOPHILS # BLD AUTO: 0 10E3/UL (ref 0–0.2)
BASOPHILS NFR BLD AUTO: 0 %
BILIRUB DIRECT SERPL-MCNC: <0.2 MG/DL (ref 0–0.3)
BILIRUB SERPL-MCNC: 0.3 MG/DL
BILIRUB UR QL STRIP: NEGATIVE
BUN SERPL-MCNC: 28.9 MG/DL (ref 8–23)
CALCIUM SERPL-MCNC: 10.2 MG/DL (ref 8.8–10.2)
CHLORIDE SERPL-SCNC: 104 MMOL/L (ref 98–107)
COLOR UR AUTO: ABNORMAL
CREAT SERPL-MCNC: 1.02 MG/DL (ref 0.51–0.95)
DEPRECATED HCO3 PLAS-SCNC: 26 MMOL/L (ref 22–29)
EGFRCR SERPLBLD CKD-EPI 2021: 56 ML/MIN/1.73M2
EOSINOPHIL # BLD AUTO: 0 10E3/UL (ref 0–0.7)
EOSINOPHIL NFR BLD AUTO: 1 %
ERYTHROCYTE [DISTWIDTH] IN BLOOD BY AUTOMATED COUNT: 12.8 % (ref 10–15)
GLUCOSE SERPL-MCNC: 136 MG/DL (ref 70–99)
GLUCOSE UR STRIP-MCNC: NEGATIVE MG/DL
HCT VFR BLD AUTO: 39 % (ref 35–47)
HGB BLD-MCNC: 13.4 G/DL (ref 11.7–15.7)
HGB UR QL STRIP: NEGATIVE
IMM GRANULOCYTES # BLD: 0 10E3/UL
IMM GRANULOCYTES NFR BLD: 0 %
KETONES UR STRIP-MCNC: ABNORMAL MG/DL
LACTATE SERPL-SCNC: 1.4 MMOL/L (ref 0.7–2)
LEUKOCYTE ESTERASE UR QL STRIP: NEGATIVE
LIPASE SERPL-CCNC: 52 U/L (ref 13–60)
LYMPHOCYTES # BLD AUTO: 1.1 10E3/UL (ref 0.8–5.3)
LYMPHOCYTES NFR BLD AUTO: 13 %
MCH RBC QN AUTO: 31.8 PG (ref 26.5–33)
MCHC RBC AUTO-ENTMCNC: 34.4 G/DL (ref 31.5–36.5)
MCV RBC AUTO: 93 FL (ref 78–100)
MONOCYTES # BLD AUTO: 0.3 10E3/UL (ref 0–1.3)
MONOCYTES NFR BLD AUTO: 4 %
MUCOUS THREADS #/AREA URNS LPF: PRESENT /LPF
NEUTROPHILS # BLD AUTO: 7.1 10E3/UL (ref 1.6–8.3)
NEUTROPHILS NFR BLD AUTO: 82 %
NITRATE UR QL: POSITIVE
NRBC # BLD AUTO: 0 10E3/UL
NRBC BLD AUTO-RTO: 0 /100
PH UR STRIP: 5.5 [PH] (ref 5–7)
PLATELET # BLD AUTO: 237 10E3/UL (ref 150–450)
POTASSIUM SERPL-SCNC: 4.8 MMOL/L (ref 3.4–5.3)
PROT SERPL-MCNC: 7.5 G/DL (ref 6.4–8.3)
RBC # BLD AUTO: 4.21 10E6/UL (ref 3.8–5.2)
RBC URINE: 2 /HPF
SODIUM SERPL-SCNC: 139 MMOL/L (ref 135–145)
SP GR UR STRIP: 1.02 (ref 1–1.03)
SQUAMOUS EPITHELIAL: 1 /HPF
UROBILINOGEN UR STRIP-MCNC: <2 MG/DL
WBC # BLD AUTO: 8.6 10E3/UL (ref 4–11)
WBC URINE: 2 /HPF

## 2024-02-12 PROCEDURE — 83605 ASSAY OF LACTIC ACID: CPT | Performed by: EMERGENCY MEDICINE

## 2024-02-12 PROCEDURE — 85025 COMPLETE CBC W/AUTO DIFF WBC: CPT | Performed by: EMERGENCY MEDICINE

## 2024-02-12 PROCEDURE — 250N000011 HC RX IP 250 OP 636: Performed by: EMERGENCY MEDICINE

## 2024-02-12 PROCEDURE — 81001 URINALYSIS AUTO W/SCOPE: CPT | Performed by: EMERGENCY MEDICINE

## 2024-02-12 PROCEDURE — 80053 COMPREHEN METABOLIC PANEL: CPT | Performed by: EMERGENCY MEDICINE

## 2024-02-12 PROCEDURE — 250N000013 HC RX MED GY IP 250 OP 250 PS 637: Performed by: EMERGENCY MEDICINE

## 2024-02-12 PROCEDURE — 99285 EMERGENCY DEPT VISIT HI MDM: CPT | Mod: 25

## 2024-02-12 PROCEDURE — 74177 CT ABD & PELVIS W/CONTRAST: CPT | Mod: MG

## 2024-02-12 PROCEDURE — 36415 COLL VENOUS BLD VENIPUNCTURE: CPT | Performed by: EMERGENCY MEDICINE

## 2024-02-12 PROCEDURE — 87086 URINE CULTURE/COLONY COUNT: CPT | Performed by: EMERGENCY MEDICINE

## 2024-02-12 PROCEDURE — 83690 ASSAY OF LIPASE: CPT | Performed by: EMERGENCY MEDICINE

## 2024-02-12 RX ORDER — CEPHALEXIN 500 MG/1
500 CAPSULE ORAL ONCE
Status: COMPLETED | OUTPATIENT
Start: 2024-02-12 | End: 2024-02-12

## 2024-02-12 RX ORDER — CEPHALEXIN 500 MG/1
500 CAPSULE ORAL 2 TIMES DAILY
Qty: 6 CAPSULE | Refills: 0 | Status: SHIPPED | OUTPATIENT
Start: 2024-02-12 | End: 2024-02-15

## 2024-02-12 RX ORDER — ONDANSETRON 4 MG/1
4 TABLET, ORALLY DISINTEGRATING ORAL EVERY 8 HOURS PRN
Qty: 20 TABLET | Refills: 0 | Status: SHIPPED | OUTPATIENT
Start: 2024-02-12 | End: 2024-02-22

## 2024-02-12 RX ORDER — IOPAMIDOL 755 MG/ML
100 INJECTION, SOLUTION INTRAVASCULAR ONCE
Status: COMPLETED | OUTPATIENT
Start: 2024-02-12 | End: 2024-02-12

## 2024-02-12 RX ADMIN — IOPAMIDOL 75 ML: 755 INJECTION, SOLUTION INTRAVENOUS at 04:55

## 2024-02-12 RX ADMIN — CEPHALEXIN 500 MG: 500 CAPSULE ORAL at 06:14

## 2024-02-12 ASSESSMENT — ACTIVITIES OF DAILY LIVING (ADL)
ADLS_ACUITY_SCORE: 38
ADLS_ACUITY_SCORE: 38

## 2024-02-12 NOTE — ED NOTES
EMERGENCY DEPARTMENT SIGN OUT NOTE        BRIEF HISTORY  Patient was signed out to me by Dr. Reed at 3:23 AM      Zahra Fitzgerald is a 78 year old female who presented for constipation and abdominal pain .     Patient's  reports that the patient has not had a bowel movement in about 3 days which is causing diffuse abdominal pain. The patient's son notes that the patient started vomiting around 9 PM tonight. Patient's  also reports that the patient's blood pressure was high at 179 at home. Patient's son notes that the patient has been complaining of on and off pain to her sides for the past few weeks. He also notes that the patient has a history of back pain.     Disposition pending results of labs, UA, and CT scan.       LAB  Pertinent labs results reviewed in Epic.    RADIOLOGY    Pertinent imaging reviewed. Please see official radiology report.      ED COURSE  5:24 AM I rechecked the patient. Patient reports her pain has resolved  5:56 AM I updated the patient with lab and imaging results. Plan for antibiotics and discharge     MEDICAL DECISION MAKING  78 year old female presented for evaluation of abdominal pain and nausea.  She has a history of dementia and urinary incontinence.  Apparently, symptoms began around 9 PM this evening when patient started to have emesis.  Plan at time of signout is to follow-up on results of labs, UA, and CT scan.    BMP reassuring. No evidence of JUAN, acidosis, or significant electrolyte derangement. CBC reassuring. No evidence of leukocytosis to suggest systemic infectious/inflammatory process. No acute anemia. PLTs wnl. Lactate within normal limits, less likely end-organ ischemia or systemic infectious process. LFTs reassuring. No acute elevation of bilirubin or transaminates to suggest acute hepatobiliary process. CT showed diverticulosis without diverticulitis and no other acute intra-abdominal process to explain symptoms or even, significant stool burden.   UA revealed many bacteria and nitrite concerning for possible infection, although may also be contaminant given incontinence.    I rechecked the patient and reviewed results.  She reported that her pain actually completely resolved when she got here.  She has been able to tolerate p.o. and ambulate to the bathroom without difficulty.  Both patient and her family members were comfortable with plan for discharge with a prescription for antibiotics and Zofran.  Did explain to them that we will send urine for culture and call them if the antibiotic needs to be changed.  She will otherwise follow-up with her primary care provider.    The importance of close follow up was discussed. I instructed Ms. Fitzgerald to follow-up with her primary care provider. We reviewed warning signs and symptoms, and I instructed Ms. Fitzgerald to return to the emergency department immediately if she develops any new or worsening symptoms. I provided additional verbal discharge instructions. Ms. Fitzgerald expressed understanding and agreement with this plan of care, her questions were answered, and she was discharged in stable condition.     FINAL IMPRESSION    1. Acute cystitis without hematuria    2. Abdominal pain, unspecified abdominal location    3. Nausea           Joan Perez MD  Emergency Medicine  [facility]      Joan Perez MD  02/12/24 5124

## 2024-02-12 NOTE — DISCHARGE INSTRUCTIONS
You were seen in the Emergency Department today for abdominal pain, nausea, and constipation. As we discussed, it looks like you have a urinary tract infection.      You are being sent with a prescription for antibiotics and zofran to help with nausea if it comes back. Please take as directed. Someone will call you if your urine culture shows your antibiotic needs to be changed.    You can use tylenol for pain. I would also recommend you drink plenty of fluids and make sure you're eating enough fiber so you keep your bowels regular.       Please return to the ER if you experience fever, chills, inability to keep food/fluids down, abdominal pain, and/or for any other new or concerning symptoms, otherwise please follow up with your primary doctor in 3-5 days for recheck.     Below is some information you might find useful.     Thank you for choosing [facility]. It was a pleasure taking care of you today!  -Dr. Joan Perez

## 2024-02-12 NOTE — PROGRESS NOTES
Clinic Care Coordination Contact  Care Coordination Clinician Chart Review    Situation: Patient chart reviewed by Care Coordinator.       Background: Care Coordination Program started: 7/24/2023. Initial assessment completed and patient-centered care plan(s) were developed with participation from patient. Lead CC handed patient off to CHW for continued outreaches.       Assessment: Per chart review, patient outreach completed by CC CHW on 1/18/24.  Patient is actively working to accomplish goal(s). Patient's goal(s) appropriate and relevant at this time. Patient is not due for updated Plan of Care.  Assessments will be completed annually or as needed/with change of patient status.      Care Plan: Help At Home       Problem: In-home support       Goal: Establish home support       Start Date: 8/1/2023 Expected End Date: 11/30/2023    This Visit's Progress: 30% Recent Progress: 30%    Note:     Goal Statement: I would like to look into home support over the next two months as well as plan for potential future living arrangements.   Barriers: application timelines, Medicare Coverage  Strengths: accepting of help  Patient expressed understanding of goal: yes    Action steps to achieve this goal:  My family will review resources sent to me via email. Programs that are free to search, like realtors for Assisted Living. [facility] Care: https://Algonomics.Kongregate/ and [employer] Connection: [email or web address].Kongregate/[place]/ . I Catalino Douglas son let the CHW that we have started looking into Assisted Living options and working to get Stella name on the wait list for [facility] in [place]. Continuous (MB)  My family will consider connecting with resources that are the best fit for me. CHW is sending me Khris the resources sent from [facility] along with a Help in the Home resource. I will look into resources soon. I Khris will also see if Zahra has coverage for PCA services through a life insurance policy.  Continuous (MB)  My family will work with my PCP and care coordination team as we work to get home care in place. Continuous (MB)  My family will continue to outreach to care coordination as needed for additional resources or supports. Continuous (MB)                                 Plan/Recommendations: The patient will continue working with Care Coordination to achieve goal(s) as above. CHW will continue outreaches at minimum every 30 days and will involve Lead CC as needed or if patient is ready to move to Maintenance. Lead CC will continue to monitor CHW outreaches and patient's progress to goal(s) every 6 weeks.     Plan of Care updated and sent to patient: No         Negative

## 2024-02-12 NOTE — ED PROVIDER NOTES
EMERGENCY DEPARTMENT ENCOUNTER      NAME: Zahra Fitzgerald  AGE: 78 year old female  YOB: 1945  MRN: [id number]  EVALUATION DATE & TIME: 2024  2:27 AM    PCP: Kushal Motley    ED PROVIDER: Blas Reed D.O.      Chief Complaint   Patient presents with    Constipation     vomiting       FINAL IMPRESSION:  Abdominal pain      ED COURSE & MEDICAL DECISION MAKIN:28 AM I met with the patient to gather history and to perform my initial exam. I discussed the plan for care while in the Emergency Department.  3:30 AM patient care turned over to Dr. Perez.           Pertinent Labs & Imaging studies reviewed. (See chart for details)  78 year old female presents to the Emergency Department for evaluation of nausea and vomiting with acute abdominal pain.  Pain was localized to the left lower quadrant on my exam.  Initial differential does include diverticulitis, colitis, bowel obstruction, volvulus, and patient does have known history of frequent constipation.  At time of turnover labs and CT are pending.  If CT does not show evidence of acute process, and labs are nonconcerning, I do not see indication for admission with this patient.  Patient care turned over to Dr. Perez pending results of testing.    Medical Decision Making  Obtained supplemental history:Supplemental history obtained?: Family Member/Significant Other  Reviewed external records: External records reviewed?: No  Care impacted by chronic illness:Dementia and Hypertension  Care significantly affected by social determinants of health:N/A  Did you consider but not order tests?: Work up considered but not performed and documented in chart, if applicable  Did you interpret images independently?: Independent interpretation of ECG and images noted in documentation, when applicable.  Consultation discussion with other provider:Did you involve another provider (consultant, , pharmacy, etc.)?: No  Admission considered. Patient was signed  "out to the oncoming physician, disposition pending.    At the conclusion of the encounter I discussed the results of all of the tests and the disposition. The questions were answered. The patient or family acknowledged understanding and was agreeable with the care plan.      HPI    Patient information was obtained from: Patient and patient's family     Use of : N/A      Zahra Fitzgerald is a 78 year old female who presents with abdominal pain and constipation     Per chart review, patient has history of Alzheimer's dementia, diverticulitis, hypertension, hyperlipidemia, normal pressure hydrocephalus.    HPI limited due to patient's dementia    Patient's  reports that the patient has not had a bowel movement in about 3 days which is causing diffuse abdominal pain.  The patient's son notes that the patient started vomiting around 9 PM tonight.  Patient's  also reports that the patient's blood pressure was high at 179 at home.  Patient's son notes that the patient has been complaining of on and off pain to her sides for the past few weeks.  He also notes that the patient has a history of back pain.    The patient reports she has pain \"all over\", but endorses pain with palpation to the left lower quadrant.     REVIEW OF SYSTEMS  Unable to perform due to the patient's dementia  GI: Positive for abdominal pain, vomiting, and constipation     PAST MEDICAL HISTORY:  No past medical history on file.    PAST SURGICAL HISTORY:  Past Surgical History:   Procedure Laterality Date    [facility] APPENDECTOMY      Description: Appendectomy;  Recorded: 03/12/2009;         CURRENT MEDICATIONS:    Current Facility-Administered Medications   Medication    denosumab (PROLIA) injection 60 mg     Current Outpatient Medications   Medication    aspirin 81 mg chewable tablet    atenolol (TENORMIN) 25 MG tablet    black cohosh 540 mg cap    calcium carbonate 600 mg-vitamin D 400 units (CALTRATE) 600-400 MG-UNIT per " tablet    FOLIC ACID/MULTIVITS-MIN/LUT (CENTRUM SILVER ORAL)    simvastatin (ZOCOR) 10 MG tablet    tiZANidine (ZANAFLEX) 4 MG tablet    vancomycin (VANCOCIN) 125 MG capsule         ALLERGIES:  No Known Allergies    FAMILY HISTORY:  Family History   Problem Relation Age of Onset    Breast Cancer Mother         Unsure of age    Breast Cancer Maternal Aunt 65.00    Breast Cancer Maternal Aunt 65.00    Breast Cancer Sister 42.00       SOCIAL HISTORY:  Social History     Socioeconomic History    Marital status:    Tobacco Use    Smoking status: Never    Smokeless tobacco: Never   Vaping Use    Vaping Use: Never used       VITALS:  No data found.    PHYSICAL EXAM    VITAL SIGNS: There were no vitals taken for this visit.  General Appearance: Well-appearing, well-nourished, no acute distress   Head:  Normocephalic, without obvious abnormality, atraumatic  Eyes:  PERRL, conjunctiva/corneas clear, EOM's intact,  ENT:  Lips, mucosa, and tongue normal, membranes are moist without pallor  Neck:  Normal ROM, symmetrical, trachea midline    Cardio:  Regular rate and rhythm, no murmur, rub or gallop, 2+ pulses symmetric in all extremities  Pulm:  Clear to auscultation bilaterally, respirations unlabored,  Abdomen:  Soft, no rebound or guarding. Left lower quadrant tenderness   Musculoskeletal: Full ROM, no edema, no cyanosis, good ROM of major joints  Integument:  Warm, Dry, No erythema, No rash.    Neurologic:  Alert & oriented.  No focal deficits appreciated.  Ambulatory.  Psychiatric:  Affect normal, Judgment normal, Mood normal.      LABS  No results found for this or any previous visit (from the past 24 hour(s)).      RADIOLOGY  CT Abdomen Pelvis w Contrast    (Results Pending)       MEDICATIONS GIVEN IN THE EMERGENCY:  Medications - No data to display    NEW PRESCRIPTIONS STARTED AT TODAY'S ER VISIT  New Prescriptions    No medications on file        I, Erum Padilla, am serving as a scribe to document services  personally performed by Blas Reed D.O., based on my observations and the provider's statements to me.  I, Blas Reed D.O., attest that Erum Padilla is acting in a scribe capacity, has observed my performance of the services and has documented them in accordance with my direction.     Blas Reed D.O.  Emergency Medicine  [facility] EMERGENCY ROOM  Critical access hospital[address] [facility] [address]  [phone]  Dept: [phone]       Blas Reed,   02/13/24 1722

## 2024-02-12 NOTE — ED TRIAGE NOTES
Patient presents to the ED, brought in by son and , they state no bowel movement for the past couple days and she is having abdominal pain.     Triage Assessment (Adult)       Row Name 02/12/24 0233          Triage Assessment    Airway WDL WDL        Respiratory WDL    Respiratory WDL WDL        Skin Circulation/Temperature WDL    Skin Circulation/Temperature WDL WDL        Cardiac WDL    Cardiac WDL WDL        Peripheral/Neurovascular WDL    Peripheral Neurovascular WDL WDL        Cognitive/Neuro/Behavioral WDL    Cognitive/Neuro/Behavioral WDL X;orientation;level of consciousness     Level of Consciousness confused;other (see comments)  dementia per family     Orientation disoriented to;time;situation        Jackie Coma Scale    Assessment Qualifiers patient not sedated/intubated;no eye obstruction present

## 2024-02-14 LAB
BACTERIA UR CULT: ABNORMAL
BACTERIA UR CULT: ABNORMAL

## 2024-02-15 ENCOUNTER — TELEPHONE (OUTPATIENT)
Dept: EMERGENCY MEDICINE | Facility: CLINIC | Age: 79
End: 2024-02-15
Payer: MEDICARE

## 2024-02-15 NOTE — RESULT ENCOUNTER NOTE
Final Urine Culture Report on 2/14/24  [facility] Emergency Dept discharge antibiotic prescribed: Cephalexin (Keflex) 500 mg capsule, 1 capsule (500 mg) by mouth 2 times daily for 3 days.  #1. Bacteria, 50,000 - 100,000 CFU/ML Escherichia coli is SUSCEPTIBLE to Antibiotic.    #2.. Bacteria, 50,000 - 100,000 CFU/ML Escherichia coli is SUSCEPTIBLE to Antibiotic.    No change in treatment per [facility] ED lab result Urine Culture protocol.

## 2024-02-15 NOTE — TELEPHONE ENCOUNTER
"[facility]     Reason for call: Lab Result Notification     Lab Result (including Rx patient on, if applicable).  If culture, copy of lab report at bottom.  Lab Result: Final Urine Culture Report on 2/14/24  [facility] Emergency Dept discharge antibiotic prescribed: Cephalexin (Keflex) 500 mg capsule, 1 capsule (500 mg) by mouth 2 times daily for 3 days.  #1. Bacteria, 50,000 - 100,000 CFU/ML Escherichia coli is SUSCEPTIBLE to Antibiotic.    #2.. Bacteria, 50,000 - 100,000 CFU/ML Escherichia coli is SUSCEPTIBLE to Antibiotic.    No change in treatment per [facility] ED lab result Urine Culture protocol.     Patient's current Symptoms:   Pain seems to come more toward the evening.  Has not had a BM for a couple of days  \"I have a hard time getting her to drink\"    RN Recommendations/Instructions per [place] ED lab result protocol:   [facility] ED lab result protocol utilized: urine culture    Patient/care giver notified to contact your PCP clinic or return to the Emergency department if your:  Symptoms worsen or other concerning symptoms.    Elliot Friedman RN  [facility]  Emergency Dept Lab Result RN  Ph# [phone]        "

## 2024-02-20 ENCOUNTER — PATIENT OUTREACH (OUTPATIENT)
Dept: CARE COORDINATION | Facility: CLINIC | Age: 79
End: 2024-02-20
Payer: MEDICARE

## 2024-02-20 NOTE — PROGRESS NOTES
Clinic Care Coordination Contact  [facility]/[place]    Clinical Data: Care Coordinator Outreach    Outreach Documentation Number of Outreach Attempt   2/20/2024   9:05 AM 1     Left message on  patients son Khris suarez with call back information and requested return call.    Plan: Care Coordinator will try to reach patient again in 10 business days.    Pratima Dorantes  [facility] Health Worker  [facility] Care Coordination   [facility], [facility], [facility]  Office: [phone]

## 2024-02-21 ENCOUNTER — TELEPHONE (OUTPATIENT)
Dept: INTERNAL MEDICINE | Facility: CLINIC | Age: 79
End: 2024-02-21
Payer: MEDICARE

## 2024-02-21 NOTE — TELEPHONE ENCOUNTER
Transportation confirmed for appointment tomorrow. Pt woke up and was not having urinary pain.  stated it comes and goes. Advised to call back and ask to speak to a nurse if the sx return.    KEON Toledo

## 2024-02-21 NOTE — TELEPHONE ENCOUNTER
states was in er 3 times , every time says bladder infections sent home to take cephalexim, and few days later from er asked how she was doing pain is still there and only 1 pill left but they cannot refill, last night pain was so bad could not go to bed, when laying flat pain went away, third time in emergency and pills wear off pain returns, would like to put in hospital to see what's wrong more thorough , pt even threw up last night. Pt does have dementia, one time they kept her in hospital  a couple days , dr came to door asked why is she here  which was strange, then later in day sent  home called said they had to come back for stool sample, wants to know why as this is ongoing issue.  [phone] or [phone] not able to leave message just keep trying back

## 2024-02-21 NOTE — TELEPHONE ENCOUNTER
I called to follow-up with the patient, she was sleeping. The  stated her UTI sx have returned. I was unable to triage patient but did want to make a ED follow-up appointment due to them being in the hospital for this issue three times without a follow-up and pt is sx again.     Appt scheduled with Dr. Ferrari for tomorrow. Dr. Motley is on off until Friday without appts available that day. Pt's  will call back to triage sx and verify transportation for tomorrow.    KEON Toledo

## 2024-02-22 ENCOUNTER — OFFICE VISIT (OUTPATIENT)
Dept: INTERNAL MEDICINE | Facility: CLINIC | Age: 79
End: 2024-02-22
Payer: MEDICARE

## 2024-02-22 VITALS
TEMPERATURE: 98.1 F | HEART RATE: 76 BPM | SYSTOLIC BLOOD PRESSURE: 128 MMHG | BODY MASS INDEX: 29.45 KG/M2 | RESPIRATION RATE: 16 BRPM | DIASTOLIC BLOOD PRESSURE: 76 MMHG | WEIGHT: 150 LBS | OXYGEN SATURATION: 98 % | HEIGHT: 60 IN

## 2024-02-22 DIAGNOSIS — R11.0 NAUSEA: ICD-10-CM

## 2024-02-22 DIAGNOSIS — F02.C3 SEVERE LATE ONSET ALZHEIMER'S DEMENTIA WITH MOOD DISTURBANCE (H): ICD-10-CM

## 2024-02-22 DIAGNOSIS — G30.1 SEVERE LATE ONSET ALZHEIMER'S DEMENTIA WITH MOOD DISTURBANCE (H): ICD-10-CM

## 2024-02-22 DIAGNOSIS — R10.32 ABDOMINAL PAIN, LEFT LOWER QUADRANT: ICD-10-CM

## 2024-02-22 DIAGNOSIS — R35.0 URINE FREQUENCY: Primary | ICD-10-CM

## 2024-02-22 PROCEDURE — 99214 OFFICE O/P EST MOD 30 MIN: CPT | Performed by: INTERNAL MEDICINE

## 2024-02-22 RX ORDER — ONDANSETRON 4 MG/1
4 TABLET, ORALLY DISINTEGRATING ORAL EVERY 8 HOURS PRN
Qty: 20 TABLET | Refills: 1 | Status: SHIPPED | OUTPATIENT
Start: 2024-02-22

## 2024-02-22 NOTE — PROGRESS NOTES
Office Visit - Follow Up   Zahra Fitzgerald   78 year old female    Date of Visit: 2/22/2024    Chief Complaint   Patient presents with    UTI     Abdominal pain          -------------------------------------------------------------------------------------------------------------------------  Assessment and Plan    Acute symptom visit, and also follow-up after emergency department visit of February 12, 2024 because of  Here with her  Blas and son Khris  In the house is Blas (senior), Zahra, and her other son also Blas (antoine)    Recurring lower abdominal pain followed by nausea, with history of recent urinary tract infection treated with cephalexin    Zahra has had 2 trips to the emergency department over the last 2 months or so (December 3, 2023 and February 12, 2024). The main symptom that brought her in was lower abdominal pain followed by nausea and then vomiting.  The December 3, 2023 emergency department visit was after a fall.    February 12, 2024 the emergency department did an extensive evaluation including CT scan of the abdomen and pelvis which showed only diverticulosis, but nothing acute, specifically no signs of diverticulitis or kidney stone.  They did discover Zahra to have a urinary tract infection with 2 strains of E. coli demonstrated, which were sensitive to first generation several cephalosporin, and then she was treated with oral cephalexin.  Her  assures me that she finished out the course of antibiotic pills.    Yet the problem persists of lower abdominal pain that typically occurs in the evening after supper, and then she will get nauseated and sometimes vomit.  It has been going on several months, unclear exactly when it started.    Both the emergency department and myself are not sure why she is having this symptom, since there were no clues on the CT scan, there do not and she does not take any potentially offending medications, and her metabolic  parameters seemed okay on laboratory testing.    I told her and the family that I think we can draw some reassurance from the test that there is not a dangerous condition going on such as bowel obstruction, bowel infarction, abscess, obstruction, kidney stone, pancreatitis, etc.    Thus it might make sense to treat symptoms, using the ondansetron that was prescribed from the emergency department on February 12, but unclear whether the prescription actually got filled and picked up.    I will send a new prescription for ondansetron to her [facility] pharmacy, with instructions to take it in case of nausea, or even to take it proactively before supper which seems to be the problematic mealtime.    I want her to stay well-hydrated.  If there is any concern for constipation, okay to use over-the-counter stool softener or MiraLAX osmotic laxative powder.    I asked her to be alert for potential foods that are up symptom triggers.  Thus far they have not since noted anything suspicious in terms of cause-and-effect    2-  Culture 50,000-100,000 CFU/mL Escherichia coli Abnormal       50,000-100,000 CFU/mL Escherichia coli Abnormal             VERENICE VERENICE    Ampicillin >=32 ug/mL Resistant 8 ug/mL Susceptible    Ampicillin/ Sulbactam 8 ug/mL Susceptible 4 ug/mL Susceptible    Cefazolin <=4 ug/mL Susceptible 1 <=4 ug/mL Susceptible 1    Cefepime <=1 ug/mL Susceptible <=1 ug/mL Susceptible    Cefoxitin <=4 ug/mL Susceptible 8 ug/mL Susceptible    Ceftazidime <=1 ug/mL Susceptible <=1 ug/mL Susceptible    Ceftriaxone <=1 ug/mL Susceptible <=1 ug/mL Susceptible    Ciprofloxacin >=4 ug/mL Resistant <=0.25 ug/mL Susceptible    Gentamicin <=1 ug/mL Susceptible <=1 ug/mL Susceptible    Levofloxacin >=8 ug/mL Resistant <=0.12 ug/mL Susceptible    Nitrofurantoin <=16 ug/mL Susceptible <=16 ug/mL Susceptible    Piperacillin/Tazobactam <=4 ug/mL Susceptible <=4 ug/mL Susceptible    Tobramycin <=1 ug/mL Susceptible <=1 ug/mL Susceptible     Trimethoprim/Sulfamethoxazole <=1/19 ug/mL Susceptible <=1/19 ug/mL Susceptible       EXAM: CT ABDOMEN PELVIS W CONTRAST  LOCATION: [facility]  DATE: 2/12/2024  INDICATION: LLQ abdominal pain  FINDINGS:   LOWER CHEST: Coronary artery calcifications. Calcified left basilar granuloma and left hilar lymph nodes. Small hiatal hernia.  HEPATOBILIARY: Normal.  PANCREAS: Normal.  SPLEEN: Scattered calcified granulomas.  ADRENAL GLANDS: Normal.  KIDNEYS/BLADDER: Benign left midpole cortical cyst requiring no follow-up. No hydronephrosis. Normal bladder.  BOWEL: Normal caliber small bowel. Fecalization of a few segments of distal ileum which could reflect prolonged transit, though no dilatation or transition point to suggest mechanical obstruction. The appendix is absent.  Moderate distal colonic   diverticulosis.  LYMPH NODES: Normal.  VASCULATURE: Scant atherosclerotic calcification.  PELVIC ORGANS: Normal.  MUSCULOSKELETAL: Thoracolumbar spine degenerative disc change.                                                                 IMPRESSION:   1.  Colonic diverticulosis without evidence of acute diverticulitis.    Persistent tenderness likely from scar tissue where she sustained a hematoma over her right anterolateral thigh from the fall that occurred in December 2023 that prompted a visit to the emergency department.    Advanced dementia could be playing a role in bowel function disturbance and bladder infections, and considering that possibility, I suggested to her son Khris, that they consider implementing a program of scheduled/prompted voiding and toileting.  An example would be that during waking hours, on the hour, set her on the toilet and asked her to empty bladder and bowels, rather than waiting for urges    History of C. difficile colitis in November 2023, treated with oral vancomycin.  As of February 2024, not experiencing diarrhea, so I feel confident that the C. 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 disturbances and history of normal pressure hydrocephalus  Had been prescribed quetiapine, but is no longer on her list, so apparently she does not take it anymore    --------------------------------------------------------------------------------------------------------------------------  History of Present Illness  This 78 year old old     Acute symptom visit, and also follow-up after emergency department visit of February 12, 2024 because of  Here with her  Blas and son Khris  In the house is Blas (senior)Zahra, and her other son also Blas (antoine)    Recurring lower abdominal pain followed by nausea, with history of recent urinary tract infection treated with cephalexin    Zahra has had 2 trips to the emergency department over the last 2 months or so (December 3, 2023 and February 12, 2024). The main symptom that brought her in was lower abdominal pain followed by nausea and then vomiting.  The December 3, 2023 emergency department visit was after a fall.    February 12, 2024 the emergency department did an extensive evaluation including CT scan of the abdomen and pelvis which showed only diverticulosis, but nothing acute, specifically no signs of diverticulitis or kidney stone.  They did discover Zahra to have a urinary tract infection with 2 strains of E. coli demonstrated, which were sensitive to first generation several cephalosporin, and then she was treated with oral cephalexin.  Her  assures me that she finished out the course of antibiotic pills.    Yet the problem persists of lower abdominal pain that typically occurs in the evening after supper, and then she will get nauseated and sometimes vomit.   It has been going on several months, unclear exactly when it started.    Wt Readings from Last 3 Encounters:   02/22/24 68 kg (150 lb)   02/12/24 70.8 kg (156 lb)   10/20/23 67.3 kg (148 lb 4.8 oz)     BP Readings from Last 3 Encounters:   02/22/24 128/76   02/12/24 135/71   12/03/23 124/58       ---------------------------------------------------------------------------------------------------------------------------    Medications, Allergies, Social, and Problem List   MED REC REQUIRED  Post Medication Reconciliation Status: discharge medications reconciled, continue medications without change      Current Outpatient Medications   Medication Sig Dispense Refill    aspirin 81 mg chewable tablet Take 81 mg by mouth every evening      atenolol (TENORMIN) 25 MG tablet TAKE ONE TABLET BY MOUTH ONE TIME DAILY 90 tablet 3    black cohosh 540 mg cap Take 1 capsule by mouth every evening      calcium carbonate 600 mg-vitamin D 400 units (CALTRATE) 600-400 MG-UNIT per tablet Take 1 tablet by mouth daily      FOLIC ACID/MULTIVITS-MIN/LUT (CENTRUM SILVER ORAL) Take 1 tablet by mouth daily      ondansetron (ZOFRAN ODT) 4 MG ODT tab Take 1 tablet (4 mg) by mouth every 8 hours as needed 20 tablet 0    simvastatin (ZOCOR) 10 MG tablet TAKE ONE TABLET BY MOUTH AT BEDTIME 90 tablet 3    tiZANidine (ZANAFLEX) 4 MG tablet Take 1 tablet (4 mg) by mouth 3 times daily as needed for muscle spasms 10 tablet 0    vancomycin (VANCOCIN) 125 MG capsule Take 1 capsule (125 mg) by mouth 4 times daily (Patient not taking: Reported on 2/22/2024) 40 capsule 0     No Known Allergies  Social History     Tobacco Use    Smoking status: Never     Passive exposure: Never    Smokeless tobacco: Never   Vaping Use    Vaping Use: Never used     Patient Active Problem List   Diagnosis    Diverticulosis    Hyperlipemia    Hypertension    Osteoporosis    Normal pressure hydrocephalus (H)    Severe late onset Alzheimer's dementia with mood disturbance (H)     Diverticulitis of colon        Reviewed, reconciled and updated       Physical Exam   General Appearance:       /76 (BP Location: Right arm, Patient Position: Sitting, Cuff Size: Adult Regular)   Pulse 76   Temp 98.1  F (36.7  C)   Resp 16   Ht 1.524 m (5')   Wt 68 kg (150 lb)   LMP  (LMP Unknown)   SpO2 98%   BMI 29.29 kg/m      Alert, answers simple questions appropriately, but clearly struggles with more complex questions and is not able to report her medical history reliably.  Lungs clear  Heart regular rate and rhythm  Abdomen nontender     Additional Information   I spent 30 minutes on this encounter, including reviewing interval history since last visit, examining the patient, explaining and counseling the issues enumerated in the Assessment and Plan (patient given a copy), ordering prescriptions       LUCY NEGRON MD, MD

## 2024-02-22 NOTE — PATIENT INSTRUCTIONS
Acute symptom visit, and also follow-up after emergency department visit of February 12, 2024 because of  Here with her  Blas and son Khris  In the house is Blas (senior)Zahra, and her other son also Blas (antoine)    Recurring lower abdominal pain followed by nausea, with history of recent urinary tract infection treated with cephalexin    Zhara has had 2 trips to the emergency department over the last 2 months or so (December 3, 2023 and February 12, 2024). The main symptom that brought her in was lower abdominal pain followed by nausea and then vomiting.  The December 3, 2023 emergency department visit was after a fall.    February 12, 2024 the emergency department did an extensive evaluation including CT scan of the abdomen and pelvis which showed only diverticulosis, but nothing acute, specifically no signs of diverticulitis or kidney stone.  They did discover Zahra to have a urinary tract infection with 2 strains of E. coli demonstrated, which were sensitive to first generation several cephalosporin, and then she was treated with oral cephalexin.  Her  assures me that she finished out the course of antibiotic pills.    Yet the problem persists of lower abdominal pain that typically occurs in the evening after supper, and then she will get nauseated and sometimes vomit.  It has been going on several months, unclear exactly when it started.    Both the emergency department and myself are not sure why she is having this symptom, since there were no clues on the CT scan, there do not and she does not take any potentially offending medications, and her metabolic parameters seemed okay on laboratory testing.    I told her and the family that I think we can draw some reassurance from the test that there is not a dangerous condition going on such as bowel obstruction, bowel infarction, abscess, obstruction, kidney stone, pancreatitis, etc.    Thus it might make sense to treat symptoms,  using the ondansetron that was prescribed from the emergency department on February 12, but unclear whether the prescription actually got filled and picked up.    I will send a new prescription for ondansetron to her [facility] pharmacy, with instructions to take it in case of nausea, or even to take it proactively before supper which seems to be the problematic mealtime.    I want her to stay well-hydrated.  If there is any concern for constipation, okay to use over-the-counter stool softener or MiraLAX osmotic laxative powder.    I asked her to be alert for potential foods that are up symptom triggers.  Thus far they have not since noted anything suspicious in terms of cause-and-effect    2-  Culture 50,000-100,000 CFU/mL Escherichia coli Abnormal       50,000-100,000 CFU/mL Escherichia coli Abnormal             VERENICE VERENICE    Ampicillin >=32 ug/mL Resistant 8 ug/mL Susceptible    Ampicillin/ Sulbactam 8 ug/mL Susceptible 4 ug/mL Susceptible    Cefazolin <=4 ug/mL Susceptible 1 <=4 ug/mL Susceptible 1    Cefepime <=1 ug/mL Susceptible <=1 ug/mL Susceptible    Cefoxitin <=4 ug/mL Susceptible 8 ug/mL Susceptible    Ceftazidime <=1 ug/mL Susceptible <=1 ug/mL Susceptible    Ceftriaxone <=1 ug/mL Susceptible <=1 ug/mL Susceptible    Ciprofloxacin >=4 ug/mL Resistant <=0.25 ug/mL Susceptible    Gentamicin <=1 ug/mL Susceptible <=1 ug/mL Susceptible    Levofloxacin >=8 ug/mL Resistant <=0.12 ug/mL Susceptible    Nitrofurantoin <=16 ug/mL Susceptible <=16 ug/mL Susceptible    Piperacillin/Tazobactam <=4 ug/mL Susceptible <=4 ug/mL Susceptible    Tobramycin <=1 ug/mL Susceptible <=1 ug/mL Susceptible    Trimethoprim/Sulfamethoxazole <=1/19 ug/mL Susceptible <=1/19 ug/mL Susceptible       EXAM: CT ABDOMEN PELVIS W CONTRAST  LOCATION: [facility]  DATE: 2/12/2024  INDICATION: LLQ abdominal pain  FINDINGS:   LOWER CHEST: Coronary artery calcifications. Calcified left basilar granuloma and left hilar  lymph nodes. Small hiatal hernia.  HEPATOBILIARY: Normal.  PANCREAS: Normal.  SPLEEN: Scattered calcified granulomas.  ADRENAL GLANDS: Normal.  KIDNEYS/BLADDER: Benign left midpole cortical cyst requiring no follow-up. No hydronephrosis. Normal bladder.  BOWEL: Normal caliber small bowel. Fecalization of a few segments of distal ileum which could reflect prolonged transit, though no dilatation or transition point to suggest mechanical obstruction. The appendix is absent.  Moderate distal colonic   diverticulosis.  LYMPH NODES: Normal.  VASCULATURE: Scant atherosclerotic calcification.  PELVIC ORGANS: Normal.  MUSCULOSKELETAL: Thoracolumbar spine degenerative disc change.                                                                 IMPRESSION:   1.  Colonic diverticulosis without evidence of acute diverticulitis.    Persistent tenderness likely from scar tissue where she sustained a hematoma over her right anterolateral thigh from the fall that occurred in December 2023 that prompted a visit to the emergency department.    Advanced dementia could be playing a role in bowel function disturbance and bladder infections, and considering that possibility, I suggested to her son Khris, that they consider implementing a program of scheduled/prompted voiding and toileting.  An example would be that during waking hours, on the hour, set her on the toilet and asked her to empty bladder and bowels, rather than waiting for urges    History of C. difficile colitis in November 2023, treated with oral vancomycin.  As of February 2024, not experiencing diarrhea, so I feel confident that the C. difficile has been treated    History of acute diverticulitis October 2023, seen in the emergency department October 20, 2023, treated with oral antibiotic ciprofloxacin plus metronidazole    Essential hypertension, takes atenolol as monotherapy    Severe late onset Alzheimer's disease with mood disturbances and history of normal pressure  hydrocephalus  Had been prescribed quetiapine, but is no longer on her list, so apparently she does not take it anymore

## 2024-02-24 ENCOUNTER — TELEPHONE (OUTPATIENT)
Dept: FAMILY MEDICINE | Facility: CLINIC | Age: 79
End: 2024-02-24
Payer: MEDICARE

## 2024-02-24 NOTE — TELEPHONE ENCOUNTER
Attempted to reach pt to notify of technical difficulty sending pt's ondansetron prescription to [facility] pharmacy. No answer. Did not leave VM.     Zandra Lay RN

## 2024-03-04 ENCOUNTER — OFFICE VISIT (OUTPATIENT)
Dept: NEUROLOGY | Facility: CLINIC | Age: 79
End: 2024-03-04
Attending: INTERNAL MEDICINE
Payer: MEDICARE

## 2024-03-04 ENCOUNTER — LAB (OUTPATIENT)
Dept: LAB | Facility: CLINIC | Age: 79
End: 2024-03-04
Payer: MEDICARE

## 2024-03-04 VITALS — SYSTOLIC BLOOD PRESSURE: 91 MMHG | DIASTOLIC BLOOD PRESSURE: 57 MMHG | HEART RATE: 67 BPM

## 2024-03-04 DIAGNOSIS — R73.03 PREDIABETES: ICD-10-CM

## 2024-03-04 DIAGNOSIS — R79.9 ABNORMAL FINDING OF BLOOD CHEMISTRY, UNSPECIFIED: ICD-10-CM

## 2024-03-04 DIAGNOSIS — F02.C3 SEVERE LATE ONSET ALZHEIMER'S DEMENTIA WITH MOOD DISTURBANCE (H): ICD-10-CM

## 2024-03-04 DIAGNOSIS — G62.9 NEUROPATHY: ICD-10-CM

## 2024-03-04 DIAGNOSIS — G30.1 SEVERE LATE ONSET ALZHEIMER'S DEMENTIA WITH MOOD DISTURBANCE (H): ICD-10-CM

## 2024-03-04 DIAGNOSIS — G62.9 NEUROPATHY: Primary | ICD-10-CM

## 2024-03-04 DIAGNOSIS — G91.2 NPH (NORMAL PRESSURE HYDROCEPHALUS) (H): ICD-10-CM

## 2024-03-04 LAB
HBA1C MFR BLD: 5.7 %
TSH SERPL DL<=0.005 MIU/L-ACNC: 2.28 UIU/ML (ref 0.3–4.2)

## 2024-03-04 PROCEDURE — G2211 COMPLEX E/M VISIT ADD ON: HCPCS | Performed by: PSYCHIATRY & NEUROLOGY

## 2024-03-04 PROCEDURE — 82607 VITAMIN B-12: CPT

## 2024-03-04 PROCEDURE — 83036 HEMOGLOBIN GLYCOSYLATED A1C: CPT

## 2024-03-04 PROCEDURE — 36415 COLL VENOUS BLD VENIPUNCTURE: CPT

## 2024-03-04 PROCEDURE — 86780 TREPONEMA PALLIDUM: CPT

## 2024-03-04 PROCEDURE — 84155 ASSAY OF PROTEIN SERUM: CPT

## 2024-03-04 PROCEDURE — 99205 OFFICE O/P NEW HI 60 MIN: CPT | Performed by: PSYCHIATRY & NEUROLOGY

## 2024-03-04 PROCEDURE — 84165 PROTEIN E-PHORESIS SERUM: CPT | Mod: TC | Performed by: PATHOLOGY

## 2024-03-04 PROCEDURE — 84443 ASSAY THYROID STIM HORMONE: CPT

## 2024-03-04 PROCEDURE — 86334 IMMUNOFIX E-PHORESIS SERUM: CPT | Performed by: PATHOLOGY

## 2024-03-04 PROCEDURE — 84207 ASSAY OF VITAMIN B-6: CPT

## 2024-03-04 NOTE — NURSING NOTE
Chief Complaint   Patient presents with    Consult     Severe late onset Alzheimer's dementia with mood disturbance (H),  NPH (normal pressure hydrocephalus) (H),  Referred by Kushal Motley MD Kena Gemeda, MA on 3/4/2024 at 1:56 PM

## 2024-03-04 NOTE — LETTER
3/4/2024         RE: Zahra Fitzgerald  [address]  [place] [address]        Dear Colleague,    Thank you for referring your patient, Zahra Fitzgerald, to the [facility]. Please see a copy of my visit note below.    [facility] Neurology Consultation    Zahra Fitzgerald MRN# [id number]   Age: 78 year old YOB: 1945     Requesting physician: Kushal Vera     Reason for Consultation: dementia      History of Presenting Symptoms:   Zahra Fitzgerald is a 78 year old female who presents today for evaluation of dementia.    The patient was found unresponsive 78/22/203 and brought to the ED, where it was noted she had poor oral intake for 24 hours prior to presentation.  In the ED, she had imaging showing enlarged ventricles and it was mentioned she had a history of dementia. In her discharge summary, it is noted she has clear dementia by her IM provider, and that she needs assistance with bathroom, is using a walker for mobility, and is a vulnerable adult.    Office visits dated 1/13/2021 with her IM provider do not mention dementia, or cognitive concerns.  A 7/13/2021 miniCOG is documented as 5/5 (3/3 recall, clock appropriate).     Today, the patient is here with her  and son and can name them correctly. She tells me she is here today for unclear reasons. She doesn't feel she has a dementia but also cannot tell me what a dementia. She doesn't know how long she has been . She cannot name where she lives (city or address).  She lives with her  and son.  She makes some of her food, she may pay the bills, she drives, and thinks her  manages her medications.  She can attend to her hygiene needs.  She doesn't use a can or walker at home. She doesn't have visual hallucinations.   She doesn't feel she has acts our her dreams. She isn't sure of what she did for a living.     The patient's son and   are present today. They feel that 6 years ago she started developing forgetfulness focused towards things she was making.  She may miss a meal or forget how to make a meal.  She has had one issue of leaving the stove on.  Her pattern is that when something becomes difficult, she avoids that thing. She stopped driving around [id number], but may continue to drive here and there to local places.  The patient's  isn't quite verbal about his observations, but he still manages finances and her mediations. Their son watches both of them and confirms no major issues are noticed about his father.  There are no visual hallucinations, delusions, or wandering.  She may have more emotions at night, while sleeping.  There has been noticeable dream enactment behavior for the last few years.  They have all noted that she may have periods of time where she thinks she her mother is still around and that they are living in their old childhood home.     Social History:   Highest level of education was high-school.      Medications:     Current Outpatient Medications   Medication     aspirin 81 mg chewable tablet     atenolol (TENORMIN) 25 MG tablet     black cohosh 540 mg cap     calcium carbonate 600 mg-vitamin D 400 units (CALTRATE) 600-400 MG-UNIT per tablet     FOLIC ACID/MULTIVITS-MIN/LUT (CENTRUM SILVER ORAL)     ondansetron (ZOFRAN ODT) 4 MG ODT tab     simvastatin (ZOCOR) 10 MG tablet     tiZANidine (ZANAFLEX) 4 MG tablet      Physical Exam:   Vitals: BP 91/57 (BP Location: Right arm, Patient Position: Sitting)   Pulse 67   LMP  (LMP Unknown)    General: Seated comfortably in no acute distress. Pleasant, and looks to  and son often for answers or to answer for her when asked direct questions about her past or recent history.  Neurologic:     Mental Status: Fully alert, attentive and oriented. Speech clear and fluent, no paraphasic errors. Cannot draw a clock, cannot recall items immediately or in a delayed  fashion. Luria testing devolves into a two step shape pattern from a 3 step shape pattern. Is slow to recall her  and son, but can do so over time. Poor insight into her last 5 years, with her story conflicting with her son and husbands witnessed reports of behavior.       Cranial Nerves: Visual fields intact to threat. PERRL. EOMI with normal smooth pursuit. Facial sensation intact/symmetric. Facial movements symmetric. Hearing not formally tested but intact to conversation. Palate elevation symmetric, uvula midline. No dysarthria. Shoulder shrug strong bilaterally. Tongue protrusion midline.     Motor: No tremors or other abnormal movements observed. Muscle tone normal throughout. No pronator drift. Normal/symmetric rapid finger tapping. Strength 5/5 throughout upper and lower extremities.     Deep Tendon Reflexes: 2+/symmetric throughout upper extremities. 1+ patellar b/l, but absent achilles responses. No clonus. Toes downgoing bilaterally.     Sensory: Light touch and pinprick reporting is somewhat suspect with errors occurring in distal feet at times but not at others. Vibration sensation is absent in great toes, MM, and mid-leg, but present to a short degree in the knees (4-8 seconds or so). Vibration sensation is present in fingers fully (+15 seconds). Proprioception is absent in great toes b/l. Extremely positive Romberg.      Coordination: Finger-nose-finger with mild dysmetria upon reaching target. Rapid alternating movements intact/symmetric with normal speed and rhythm, but foot tapping is slower on the left especially with alternating heel toe tapping.     Gait: Can stand with assistance. Wider base, shorter steps, hunched over posture, tandem stance is difficult without full support (will fall in either directions).          Data: Pertinent prior to visit   Imaging:  CT head: as above, but I see large degrees of atrophy in b/l frontal, parietal, occipital and temporal lobes. There is dilatation  of the ventricles,but this is associated with ex-vacuo dilatation overall.          Assessment and Plan:   Assessment:  Alzheimer's dementia, moderate to late stages    The patient has had 6-7 years of progressive cognitive decline leading to functional impairment to the point of needing help with most basic daily tasks beyond hygiene and eating.  She has poor insight into her condition, and wasn't aware of her lack of sensation in the legs.  Overall, based on clinical progression and testing today, her condition is more in line with a progressive dementia like Alzheimer disease and a sensorimotor neuropathy that is distal and symmetric and leading to her hesitant and ataxic gait.  I do not think further evaluation for NPH is needed based on her progressive cognitive decline with gait difficulties explained through an alternative etiology, and that there would be limited help over harm with any type of shunt placed given her deficits and condition overall.  Further testing should be focused on looking for a reversible cause of her neuropathy, which is suspected to be from her diabetes and obtaining defined testing of her functional status to help her son and  in managing her cares.  She and her family would likely benefit from additional support moving forward, given his reports of feeling strained in managing her care at this time.    We discussed that at this time, I do not feel Zahra is able to manage her medical decisions or financial decisions based on lack of capacity. I think this lack of capacity is due to her dementing illness and will not improve over time. I asked that her family enact her POA and POH in this regard.      Plan:  - OT for CPT  - B12, B6, SPEP, Immunofixation, TSH, A1c, RPR    Follow up in Neurology clinic in 5-6 weeks, or should new concerns arise.    TERELL Julian D.O.   of Neurology    Total time today (62 min) in this patient encounter was spent on  pre-charting, counseling and/or coordination of care.  The patient is in agreement with this plan and has no further questions.      Again, thank you for allowing me to participate in the care of your patient.        Sincerely,        Jefry Julian, DO

## 2024-03-04 NOTE — PATIENT INSTRUCTIONS
I think Zahra has Alzheimer's dementia.  I think that her finances and health decisions should be made by her power of  and power of health. At this time, she is safe at home but further evaluations are needed to see how much more help she needs and can be provided, so that her family members do not have care-giver burnout.  - OT for CPT  - B12, B6, SPEP, Immunofixation, TSH, A1c, RPR    I also think Zahra has a neuropathy given her findings on exam today. She would benefit from using a walker at home to avoid falls, and using a shower chair or bathes given her risk for falls.

## 2024-03-04 NOTE — PROGRESS NOTES
[facility] Neurology Consultation    Zahra Fitzgerald MRN# [id number]   Age: 78 year old YOB: 1945     Requesting physician: Kushal Vera     Reason for Consultation: dementia      History of Presenting Symptoms:   Zahra Fitzgerald is a 78 year old female who presents today for evaluation of dementia.    The patient was found unresponsive 78/22/203 and brought to the ED, where it was noted she had poor oral intake for 24 hours prior to presentation.  In the ED, she had imaging showing enlarged ventricles and it was mentioned she had a history of dementia. In her discharge summary, it is noted she has clear dementia by her IM provider, and that she needs assistance with bathroom, is using a walker for mobility, and is a vulnerable adult.    Office visits dated 1/13/2021 with her IM provider do not mention dementia, or cognitive concerns.  A 7/13/2021 miniCOG is documented as 5/5 (3/3 recall, clock appropriate).     Today, the patient is here with her  and son and can name them correctly. She tells me she is here today for unclear reasons. She doesn't feel she has a dementia but also cannot tell me what a dementia. She doesn't know how long she has been . She cannot name where she lives (city or address).  She lives with her  and son.  She makes some of her food, she may pay the bills, she drives, and thinks her  manages her medications.  She can attend to her hygiene needs.  She doesn't use a can or walker at home. She doesn't have visual hallucinations.   She doesn't feel she has acts our her dreams. She isn't sure of what she did for a living.     The patient's son and  are present today. They feel that 6 years ago she started developing forgetfulness focused towards things she was making.  She may miss a meal or forget how to make a meal.  She has had one issue of leaving the stove on.  Her pattern is that when something becomes difficult, she  avoids that thing. She stopped driving around [id number], but may continue to drive here and there to local places.  The patient's  isn't quite verbal about his observations, but he still manages finances and her mediations. Their son watches both of them and confirms no major issues are noticed about his father.  There are no visual hallucinations, delusions, or wandering.  She may have more emotions at night, while sleeping.  There has been noticeable dream enactment behavior for the last few years.  They have all noted that she may have periods of time where she thinks she her mother is still around and that they are living in their old childhood home.     Social History:   Highest level of education was high-school.      Medications:     Current Outpatient Medications   Medication    aspirin 81 mg chewable tablet    atenolol (TENORMIN) 25 MG tablet    black cohosh 540 mg cap    calcium carbonate 600 mg-vitamin D 400 units (CALTRATE) 600-400 MG-UNIT per tablet    FOLIC ACID/MULTIVITS-MIN/LUT (CENTRUM SILVER ORAL)    ondansetron (ZOFRAN ODT) 4 MG ODT tab    simvastatin (ZOCOR) 10 MG tablet    tiZANidine (ZANAFLEX) 4 MG tablet      Physical Exam:   Vitals: BP 91/57 (BP Location: Right arm, Patient Position: Sitting)   Pulse 67   LMP  (LMP Unknown)    General: Seated comfortably in no acute distress. Pleasant, and looks to  and son often for answers or to answer for her when asked direct questions about her past or recent history.  Neurologic:     Mental Status: Fully alert, attentive and oriented. Speech clear and fluent, no paraphasic errors. Cannot draw a clock, cannot recall items immediately or in a delayed fashion. Luria testing devolves into a two step shape pattern from a 3 step shape pattern. Is slow to recall her  and son, but can do so over time. Poor insight into her last 5 years, with her story conflicting with her son and husbands witnessed reports of behavior.       Cranial  Nerves: Visual fields intact to threat. PERRL. EOMI with normal smooth pursuit. Facial sensation intact/symmetric. Facial movements symmetric. Hearing not formally tested but intact to conversation. Palate elevation symmetric, uvula midline. No dysarthria. Shoulder shrug strong bilaterally. Tongue protrusion midline.     Motor: No tremors or other abnormal movements observed. Muscle tone normal throughout. No pronator drift. Normal/symmetric rapid finger tapping. Strength 5/5 throughout upper and lower extremities.     Deep Tendon Reflexes: 2+/symmetric throughout upper extremities. 1+ patellar b/l, but absent achilles responses. No clonus. Toes downgoing bilaterally.     Sensory: Light touch and pinprick reporting is somewhat suspect with errors occurring in distal feet at times but not at others. Vibration sensation is absent in great toes, MM, and mid-leg, but present to a short degree in the knees (4-8 seconds or so). Vibration sensation is present in fingers fully (+15 seconds). Proprioception is absent in great toes b/l. Extremely positive Romberg.      Coordination: Finger-nose-finger with mild dysmetria upon reaching target. Rapid alternating movements intact/symmetric with normal speed and rhythm, but foot tapping is slower on the left especially with alternating heel toe tapping.     Gait: Can stand with assistance. Wider base, shorter steps, hunched over posture, tandem stance is difficult without full support (will fall in either directions).          Data: Pertinent prior to visit   Imaging:  CT head: as above, but I see large degrees of atrophy in b/l frontal, parietal, occipital and temporal lobes. There is dilatation of the ventricles,but this is associated with ex-vacuo dilatation overall.          Assessment and Plan:   Assessment:  Alzheimer's dementia, moderate to late stages    The patient has had 6-7 years of progressive cognitive decline leading to functional impairment to the point of needing  help with most basic daily tasks beyond hygiene and eating.  She has poor insight into her condition, and wasn't aware of her lack of sensation in the legs.  Overall, based on clinical progression and testing today, her condition is more in line with a progressive dementia like Alzheimer disease and a sensorimotor neuropathy that is distal and symmetric and leading to her hesitant and ataxic gait.  I do not think further evaluation for NPH is needed based on her progressive cognitive decline with gait difficulties explained through an alternative etiology, and that there would be limited help over harm with any type of shunt placed given her deficits and condition overall.  Further testing should be focused on looking for a reversible cause of her neuropathy, which is suspected to be from her diabetes and obtaining defined testing of her functional status to help her son and  in managing her cares.  She and her family would likely benefit from additional support moving forward, given his reports of feeling strained in managing her care at this time.    We discussed that at this time, I do not feel Zahra is able to manage her medical decisions or financial decisions based on lack of capacity. I think this lack of capacity is due to her dementing illness and will not improve over time. I asked that her family enact her POA and POH in this regard.      Plan:  - OT for CPT  - B12, B6, SPEP, Immunofixation, TSH, A1c, RPR    Follow up in Neurology clinic in 5-6 weeks, or should new concerns arise.    TERELL Julian D.O.   of Neurology    Total time today (62 min) in this patient encounter was spent on pre-charting, counseling and/or coordination of care.  The patient is in agreement with this plan and has no further questions.

## 2024-03-05 LAB
T PALLIDUM AB SER QL: NONREACTIVE
TOTAL PROTEIN SERUM FOR ELP: 7 G/DL (ref 6.4–8.3)
VIT B12 SERPL-MCNC: 769 PG/ML (ref 232–1245)

## 2024-03-06 ENCOUNTER — TELEPHONE (OUTPATIENT)
Dept: NEUROLOGY | Facility: CLINIC | Age: 79
End: 2024-03-06
Payer: MEDICARE

## 2024-03-06 ENCOUNTER — PATIENT OUTREACH (OUTPATIENT)
Dept: CARE COORDINATION | Facility: CLINIC | Age: 79
End: 2024-03-06
Payer: MEDICARE

## 2024-03-06 NOTE — TELEPHONE ENCOUNTER
[facility] Call Center    Phone Message    May a detailed message be left on voicemail: no     Reason for Call: Other:   Patient's , Blas, calling with questions about the OT referral. Blas would like to speak with someone to clarify why the patient needs to be scheduled for OT and what will be done at the appointment. Please call Blas back to discuss, [phone], alternate phone if no answer [phone]     Action Taken: Other: [facility] Neurology    Travel Screening: Not Applicable

## 2024-03-06 NOTE — PROGRESS NOTES
Clinic Care Coordination Contact  [facility]/Voicemail    Clinical Data: Care Coordinator Outreach    Outreach Documentation Number of Outreach Attempt   2/20/2024   9:05 AM 1   3/6/2024  10:14 AM 2     Left message on  patients son Khris  voicemail with call back information and requested return call.    ** I will make 1 more attempt to patients son Khris before sending chart to [facility] to review for dis enrollment.    Plan: Care Coordinator will try to reach patient again in 10 business days.    Pratima Dorantes  Community Health Worker  [facility] Care Coordination   [place], [place], [place], [place] and [facility]  Office: [phone]

## 2024-03-07 LAB
ALBUMIN SERPL ELPH-MCNC: 3.8 G/DL (ref 3.7–5.1)
ALPHA1 GLOB SERPL ELPH-MCNC: 0.3 G/DL (ref 0.2–0.4)
ALPHA2 GLOB SERPL ELPH-MCNC: 0.8 G/DL (ref 0.5–0.9)
B-GLOBULIN SERPL ELPH-MCNC: 1 G/DL (ref 0.6–1)
GAMMA GLOB SERPL ELPH-MCNC: 1.1 G/DL (ref 0.7–1.6)
M PROTEIN SERPL ELPH-MCNC: 0 G/DL
PROT PATTERN SERPL ELPH-IMP: NORMAL
PROT PATTERN SERPL IFE-IMP: NORMAL

## 2024-03-07 PROCEDURE — 84165 PROTEIN E-PHORESIS SERUM: CPT | Mod: 26 | Performed by: PATHOLOGY

## 2024-03-07 PROCEDURE — 86334 IMMUNOFIX E-PHORESIS SERUM: CPT | Mod: 26 | Performed by: PATHOLOGY

## 2024-03-08 LAB — PYRIDOXAL PHOS SERPL-SCNC: 61.9 NMOL/L

## 2024-03-20 ENCOUNTER — PATIENT OUTREACH (OUTPATIENT)
Dept: CARE COORDINATION | Facility: CLINIC | Age: 79
End: 2024-03-20
Payer: MEDICARE

## 2024-03-20 NOTE — PROGRESS NOTES
Clinic Care Coordination Contact  Community Health Worker Follow Up    Care Gaps:     Health Maintenance Due   Topic Date Due    HEPATITIS C SCREENING  Never done    RSV VACCINE (Pregnancy & 60+) (1 - 1-dose 60+ series) Never done    ZOSTER IMMUNIZATION (2 of 3) 02/21/2008    LIPID  10/18/2020    MEDICARE ANNUAL WELLNESS VISIT  07/13/2022    INFLUENZA VACCINE (1) 09/01/2023    COVID-19 Vaccine (4 - 2023-24 season) 09/01/2023       Patient accepted scheduling phone number for  [facility]  to schedule independently     Care Plan:   Care Plan: Help At Home       Problem: In-home support       Goal: Establish home support       Start Date: 8/1/2023 Expected End Date: 11/30/2023    This Visit's Progress: 40% Recent Progress: 30%    Note:     Goal Statement: I would like to look into home support over the next two months as well as plan for potential future living arrangements.   Barriers: application timelines, Medicare Coverage  Strengths: accepting of help  Patient expressed understanding of goal: yes    Action steps to achieve this goal:  My family will review resources sent to me via email. Programs that are free to search, like realtors for Assisted Living. [facility] Care: https://[email or web address]/ and [employer] Connection: https://[employer]/[place]/ . I Catalino Douglas son let the CHW that we have started looking into Assisted Living options and working to get Stella name on the wait list for [facility] in [place]. Continuous (MB)  My family will consider connecting with resources that are the best fit for me. CHW is sending me Khris the resources sent from [facility] along with a Help in the Home resource. I Khris let Stella CHW know that I will look into see if Zahra has coverage for PCA services through a life insurance policy. Stella  is also checking with his [employer] to see if he is eligible for in home services.   My family will work with my PCP and care  coordination team as we work to get home care in place. Continuous (MB)  My family will continue to outreach to care coordination as needed for additional resources or supports. Continuous (MB)                              Intervention and Education during outreach: See goal for details. No other needs at this time.    CHW Plan: CHW will follow up with patients son Khris in about 1 month.     Pratima Dorantes  [facility] Health Worker  [facility] Care Coordination   [place], [place], [place], [facility]  Office: [phone]

## 2024-03-20 NOTE — LETTER
[facility] CARE COORDINATION  [address] [facility] [address]   March 20, 2024        Zahra BILL Amilcar  [address] [address]          Dear Zahra,     Xin is an updated Patient Centered Plan of Care for your continued enrollment in Care Coordination. Please let us know if you have additional questions, concerns, or goals that we can assist with.    Sincerely,    [facility]ianna [facility] Clinical Care Coordinator  [facility], [facility], and [facility]  Patient Centered Plan of Care  About Me:        Patient Name:  Zahra Fitzgerald    YOB: 1945  Age:         78 year old   [place] MRN:    [id number] Telephone Information:  Home Phone [phone]   Mobile [phone]       Address:  [address] [address] Email address:  No e-mail address on record      Emergency Contact(s)    Name Relationship Lgl Grd Work Phone Home Phone Mobile Phone   1. LUZ FITZGERALD* Spouse No  [phone]    2. LUZ FITZGERALD* Son No   [phone]   3. CHRIS FITZGERALD* Son No   [phone]           Primary language:  English     needed? No   [facility] [employer]:  [phone] op. 1  Other communication barriers:None    Preferred Method of Communication:     Current living arrangement: I live in a private home with family    Mobility Status/ Medical Equipment: Independent w/Device        Health Maintenance  Health Maintenance Reviewed: Due/Overdue   Health Maintenance Due   Topic Date Due    HEPATITIS C SCREENING  Never done    RSV VACCINE (Pregnancy & 60+) (1 - 1-dose 60+ series) Never done    ZOSTER IMMUNIZATION (2 of 3) 02/21/2008    LIPID  10/18/2020    MEDICARE ANNUAL WELLNESS VISIT  07/13/2022    INFLUENZA VACCINE (1) 09/01/2023    COVID-19 Vaccine (4 - 2023-24 season) 09/01/2023          My Access Plan  Medical Emergency 911   Primary Clinic Line [facility]  [facility] - [phone]   24 Hour Appointment Line [phone] or  [phone]) (toll-free)   24 Hour Nurse Line [phone] (toll-free)   Preferred Urgent Care [facility], [phone]     Preferred [facility]  [phone]     Preferred Pharmacy [facility] [address] [facility] [address] [facility] Crisis Line The National Suicide Prevention Lifeline at 1-253.664.2132 or Text/Call 928           My Care Team Members  Patient Care Team         Relationship Specialty Notifications Start End    Kushal Motley MD PCP - General Internal Medicine  1/8/23     Phone: [phone] Fax: [phone]         [address] [place] [address]    Kushal Motley MD Assigned PCP   6/16/21     Phone: [phone] Fax: [phone]         [address] [place] [address]    Kushal Motley MD Physician Internal Medicine  7/8/21     Phone: [phone] Fax: [phone]         [address] [place] [address]    Rianna Mariee LICSW Lead Care Coordinator  Admissions 7/27/23     Phone: [phone]         Pratima Dorantes JR Community Health Worker  Admissions 8/1/23     Phone: [phone]         Jefry Julian DO Physician Neurology  8/4/23     Phone: [phone] Fax: [phone]         [address] [facility] [address]    Jefry Julian DO Assigned Neuroscience Provider   3/15/24     Phone: [phone] Fax: [phone]         [address] [place] [address]                My Care Plans  Self Management and Treatment Plan    Care Plan  Care Plan: Help At Home       Problem: In-home support       Goal: Establish home support       Start Date: 8/1/2023 Expected End Date: 11/30/2023    This Visit's Progress: 40% Recent Progress: 30%    Note:     Goal Statement: I would like to look into home support over the next two months as well as plan for  potential future living arrangements.   Barriers: application timelines, Medicare Coverage  Strengths: accepting of help  Patient expressed understanding of goal: yes    Action steps to achieve this goal:  My family will review resources sent to me via email. Programs that are free to search, like [facility] for Assisted Living. [facility] Care: https://My-Apps.Backplane/ and [employer] Connection: https://Metagenomix.Backplane/[place]/ . I Catalino Douglas son let the CHW that we have started looking into Assisted Living options and working to get Stelal name on the wait list for [facility] [place]. Continuous (MB)  My family will consider connecting with resources that are the best fit for me. CHW is sending me Khris the resources sent from [facility] along with a Help in the Home resource. I Khris let Stella CHW know that I will look into see if Zahra has coverage for PCA services through a life insurance policy. Stella  is also checking with his [employer] to see if he is eligible for in home services.   My family will work with my PCP and care coordination team as we work to get home care in place. Continuous (MB)  My family will continue to outreach to care coordination as needed for additional resources or supports. 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)    Diverticulitis of colon      Current Medications and Allergies:    Current Outpatient Medications   Medication    aspirin 81 mg chewable tablet    atenolol (TENORMIN) 25 MG tablet    black cohosh 540 mg cap    calcium carbonate 600 mg-vitamin D 400 units (CALTRATE) 600-400 MG-UNIT per tablet    FOLIC ACID/MULTIVITS-MIN/LUT (CENTRUM SILVER ORAL)     ondansetron (ZOFRAN ODT) 4 MG ODT tab    simvastatin (ZOCOR) 10 MG tablet    tiZANidine (ZANAFLEX) 4 MG tablet     Current Facility-Administered Medications   Medication    denosumab (PROLIA) injection 60 mg     No Known Allergies     Care Coordination Start Date: 7/24/2023   Frequency of Care Coordination: monthly, more frequently as needed     Form Last Updated: 03/20/2024

## 2024-03-20 NOTE — PROGRESS NOTES
Clinic Care Coordination Contact  Care Coordination Clinician Chart Review    Situation: Patient chart reviewed by Care Coordinator.       Background: Care Coordination Program started: 7/24/2023. Initial assessment completed and patient-centered care plan(s) were developed with participation from patient. Lead CC handed patient off to CHW for continued outreaches.       Assessment: Per chart review, patient outreach completed by CC CHW on 3/20/24.  Patient is actively working to accomplish goal(s). Patient's goal(s) appropriate and relevant at this time. Patient is due for updated Plan of Care.  Assessments will be completed annually or as needed/with change of patient status.      Care Plan: Help At Home       Problem: In-home support       Goal: Establish home support       Start Date: 8/1/2023 Expected End Date: 11/30/2023    This Visit's Progress: 40% Recent Progress: 30%    Note:     Goal Statement: I would like to look into home support over the next two months as well as plan for potential future living arrangements.   Barriers: application timelines, Medicare Coverage  Strengths: accepting of help  Patient expressed understanding of goal: yes    Action steps to achieve this goal:  My family will review resources sent to me via email. Programs that are free to search, like realtors for Assisted Living. [facility] Care: https://CDC Corporation.Global Roaming/ and [employer] Connection: https://Magma Global.Global Roaming/[place]/ . I Catalino Douglas son let the CHW that we have started looking into Assisted Living options and working to get Stella name on the wait list for [facility] in [place]. Continuous (MB)  My family will consider connecting with resources that are the best fit for me. CHW is sending me Khris the resources sent from [facility] along with a Help in the Home resource. I Khris Douglas CHW know that I will look into see if Zahra has coverage for PCA services through a life insurance policy.  Stella  is also checking with his [employer] to see if he is eligible for in home services.   My family will work with my PCP and care coordination team as we work to get home care in place. Continuous (MB)  My family will continue to outreach to care coordination as needed for additional resources or supports. Continuous (MB)                                 Plan/Recommendations: The patient will continue working with Care Coordination to achieve goal(s) as above. CHW will continue outreaches at minimum every 30 days and will involve Lead CC as needed or if patient is ready to move to Maintenance. Lead CC will continue to monitor CHW outreaches and patient's progress to goal(s) every 6 weeks.     Plan of Care updated and sent to patient: Yes, via mail

## 2024-04-02 ENCOUNTER — VIRTUAL VISIT (OUTPATIENT)
Dept: NEUROLOGY | Facility: CLINIC | Age: 79
End: 2024-04-02
Payer: MEDICARE

## 2024-04-02 VITALS — HEIGHT: 60 IN | WEIGHT: 155 LBS | BODY MASS INDEX: 30.43 KG/M2

## 2024-04-02 DIAGNOSIS — F02.C3 SEVERE LATE ONSET ALZHEIMER'S DEMENTIA WITH MOOD DISTURBANCE (H): Primary | ICD-10-CM

## 2024-04-02 DIAGNOSIS — G30.1 SEVERE LATE ONSET ALZHEIMER'S DEMENTIA WITH MOOD DISTURBANCE (H): Primary | ICD-10-CM

## 2024-04-02 PROCEDURE — 99214 OFFICE O/P EST MOD 30 MIN: CPT | Mod: 95 | Performed by: PSYCHIATRY & NEUROLOGY

## 2024-04-02 ASSESSMENT — PAIN SCALES - GENERAL: PAINLEVEL: NO PAIN (0)

## 2024-04-02 NOTE — PROGRESS NOTES
[facility] Neurology Follow Up Visit    Zahra Fitzgerald MRN# [id number]   Age: 78 year old YOB: 1945     Brief history of symptoms: The patient was initially seen in neurologic consultation on 3/4/2024 for evaluation of dementia. Please see the comprehensive neurologic consultation notes from those dates in the Epic records for details.     Overall impression was for Alzheimer's dementia of moderate to late stages after our initial evaluation revealed 7 years of progressive cognitive decline with functional impairment and requirement of help with most daily tasks beyond hygiene and eating.  The patient was to obtain serum testing for reversible causes of neuropathy, as well as a CPT with OT to better define level of functionality needs for daily living/support.    Today, the patient is sleeping at this time and her  and son are present.  She uses a walker already for most ambulation.  There are no new issues of delirious behavior, sleep issues, or changes in mentation.  They are working with a  for help in terms of housing, resources.  The patient's  is present daily to watch his wife and assist her, and one of her son's is home nightly to aid her in other things she needs.  She often has poor sleep schedule due to agitation at night, delaying her ability to sleep.      Physical Exam:   Vitals: Ht 1.524 m (5')   Wt 70.3 kg (155 lb)   LMP  (LMP Unknown)   BMI 30.27 kg/m     Sleeping today. Not participating in interview.         Assessment and Plan:   Assessment:  Advanced Alzheimers disease related dementia    The patient is having some issues with sleep regulation at night, but otherwise is stable in terms of her limited function and needs for assistance.  I think the family is looking for specific help in terms of respite care, at home nursing or different housing, next steps in management, and I will place a social care referral to address these concerns.  Otherwise, it  would be nice to obtain a CPT to determine a level of function, as this may aide her in determining a level of care needed at home or at nursing facilities.      Plan:  - OT evaluation for CPT  -  referral (Jennifer Gilliland)    Follow up in Neurology clinic in 3 months or earlier as needed should new concerns arise.    TERELL Julian D.O.   of Neurology    Total time today (30 min) in this patient encounter was spent on pre-charting, counseling and/or coordination of care.

## 2024-04-02 NOTE — LETTER
4/2/2024       RE: Zahra Fitzgerald  [address]  [place] [address]     Dear Colleague,    Thank you for referring your patient, Zahra Fitzgerald, to the [facility] NEUROLOGY CLINIC [place] [facility]. Please see a copy of my visit note below.    [facility] Neurology Follow Up Visit    Zahra Fitzgerald MRN# [id number]   Age: 78 year old YOB: 1945     Brief history of symptoms: The patient was initially seen in neurologic consultation on 3/4/2024 for evaluation of dementia. Please see the comprehensive neurologic consultation notes from those dates in the Epic records for details.     Overall impression was for Alzheimer's dementia of moderate to late stages after our initial evaluation revealed 7 years of progressive cognitive decline with functional impairment and requirement of help with most daily tasks beyond hygiene and eating.  The patient was to obtain serum testing for reversible causes of neuropathy, as well as a CPT with OT to better define level of functionality needs for daily living/support.    Today, the patient is sleeping at this time and her  and son are present.  She uses a walker already for most ambulation.  There are no new issues of delirious behavior, sleep issues, or changes in mentation.  They are working with a  for help in terms of housing, resources.  The patient's  is present daily to watch his wife and assist her, and one of her son's is home nightly to aid her in other things she needs.  She often has poor sleep schedule due to agitation at night, delaying her ability to sleep.      Physical Exam:   Vitals: Ht 1.524 m (5')   Wt 70.3 kg (155 lb)   LMP  (LMP Unknown)   BMI 30.27 kg/m     Sleeping today. Not participating in interview.         Assessment and Plan:   Assessment:  Advanced Alzheimers disease related dementia    The patient is having some issues with sleep  regulation at night, but otherwise is stable in terms of her limited function and needs for assistance.  I think the family is looking for specific help in terms of respite care, at home nursing or different housing, next steps in management, and I will place a social care referral to address these concerns.  Otherwise, it would be nice to obtain a CPT to determine a level of function, as this may aide her in determining a level of care needed at home or at nursing facilities.      Plan:  - OT evaluation for CPT  -  referral (Jennifer Gilliland)    Follow up in Neurology clinic in 3 months or earlier as needed should new concerns arise.      Total time today (30 min) in this patient encounter was spent on pre-charting, counseling and/or coordination of care.           Again, thank you for allowing me to participate in the care of your patient.      Sincerely,    Jefry Julian, DO

## 2024-04-02 NOTE — PATIENT INSTRUCTIONS
I believe Zahra has Alzheimer disease and it is leading to a dementia that is advanced at this time.  I think her neuropathy is likely long-standing and the cause of gait impairment.  Overall, further guidance on home safety, at home nursing, varied living facilities available, community resources (respite care, Alzheimer.org) could be addressed with a visit through one of our social workers.  I think a functional test will also help determine her true level of care needed.    If issues of agitation are leading to difficulties with sleep, and cannot be easily redirected, I would think a medication may be needed to help calm Zahra.  The medications I am thinking of can increase mortality in patient's with dementia, so I often consider them when the benefits of the medication outweigh the risks.  I have attached the medication I am thinking of to this after visit summary, and will follow closely with you to determine if it is needed based on family reports.

## 2024-04-02 NOTE — PROGRESS NOTES
Virtual Visit Details    Type of service:  Video Visit   Video Start Time:  1100  Video End Time:11:24 AM    Originating Location (pt. Location): Home    Distant Location (provider location):  On-site  Platform used for Video Visit: Kudos Knowledge

## 2024-04-02 NOTE — NURSING NOTE
Is the patient currently in the state of [place]? YES    Visit mode:VIDEO    If the visit is dropped, the patient can be reconnected by: VIDEO VISIT: Text to cell phone:   Telephone Information:   Mobile [phone]       Will anyone else be joining the visit? NO  (If patient encounters technical issues they should call [phone]    How would you like to obtain your AVS? Mail a copy    Are changes needed to the allergy or medication list? No    Are refills needed on medications prescribed by this physician?     Reason for visit: YUE DAMIAN

## 2024-04-24 ENCOUNTER — PATIENT OUTREACH (OUTPATIENT)
Dept: CARE COORDINATION | Facility: CLINIC | Age: 79
End: 2024-04-24
Payer: MEDICARE

## 2024-04-24 NOTE — PROGRESS NOTES
Clinic Care Coordination Contact  Community Health Worker Follow Up    Care Gaps:     Health Maintenance Due   Topic Date Due    HEPATITIS C SCREENING  Never done    RSV VACCINE (Pregnancy & 60+) (1 - 1-dose 60+ series) Never done    ZOSTER IMMUNIZATION (2 of 3) 02/21/2008    LIPID  10/18/2020    MEDICARE ANNUAL WELLNESS VISIT  07/13/2022    INFLUENZA VACCINE (1) 09/01/2023    COVID-19 Vaccine (4 - 2023-24 season) 09/01/2023       Patient accepted scheduling phone number for  Health lin  to schedule independently     Care Plan:   Care Plan: Help At Home       Problem: In-home support       Goal: Establish home support       Start Date: 8/1/2023 Expected End Date: 11/30/2023    Recent Progress: 40%    Note:     Goal Statement: I would like to look into home support over the next two months as well as plan for potential future living arrangements.   Barriers: application timelines, Medicare Coverage  Strengths: accepting of help  Patient expressed understanding of goal: yes    Action steps to achieve this goal:  My family will review resources sent to me via email. Programs that are free to search, like [employer] for Assisted Living. [facility] Care: https://BeSmart.Oriense/ and [employer] Connection: https://Phase III Development.Oriense/[place]/ . I Catalino Douglas son let the CHW that we got Stella name on the wait list for [facility] in [place] and we are continuing to look into other locations also.  My family will consider connecting with resources that are the best fit for me. CHW is sending me Khris the resources sent from [facility] along with a Help in the Home resource. I Khris let Stella CHW know that I will look into see if Zahra has coverage for PCA services through a life insurance policy.- Can qualify for PCA services [facility]  is also checking with his [employer] to see if he is eligible for in home services.   My family will work with my PCP and care coordination team as we  work to get home care in place. Continuous (MB)  My family will continue to outreach to care coordination as needed for additional resources or supports. Continuous (MB)                              Intervention and Education during outreach: See goal for details. No needs at this time.    CHW Plan: CHW will follow up with patients son Khris in about 1 month.    Pratima Dorantes  [facility] Health Worker  [facility] Care Coordination   [facility], [place], [place], [facility]  Office: [phone]

## 2024-04-29 ENCOUNTER — PATIENT OUTREACH (OUTPATIENT)
Dept: CARE COORDINATION | Facility: CLINIC | Age: 79
End: 2024-04-29
Payer: MEDICARE

## 2024-04-29 NOTE — PROGRESS NOTES
Clinic Care Coordination Contact  Care Coordination Clinician Chart Review    Situation: Patient chart reviewed by Care Coordinator.       Background: Care Coordination Program started: 7/24/2023. Initial assessment completed and patient-centered care plan(s) were developed with participation from patient. Lead CC handed patient off to CHW for continued outreaches.       Assessment: Per chart review, patient outreach completed by CC CHW on 4/24/24.  Patient is actively working to accomplish goal(s). Patient's goal(s) appropriate and relevant at this time. Patient is not due for updated Plan of Care.  Assessments will be completed annually or as needed/with change of patient status.      Care Plan: Help At Home       Problem: In-home support       Goal: Establish home support       Start Date: 8/1/2023 Expected End Date: 11/30/2023    Recent Progress: 40%    Note:     Goal Statement: I would like to look into home support over the next two months as well as plan for potential future living arrangements.   Barriers: application timelines, Medicare Coverage  Strengths: accepting of help  Patient expressed understanding of goal: yes    Action steps to achieve this goal:  My family will review resources sent to me via email. Programs that are free to search, like realtors for Assisted Living. [facility] Care: https://NatureBox.Quincy Apparel/ and [employer] Connection: https://Pennant.Quincy Apparel/[place]/ . I Catalino Camdenjohanna son let the CHW that we got Stella name on the wait list for [facility] in [place] and we are continuing to look into other locations also.  My family will consider connecting with resources that are the best fit for me. CHW is sending me Khris the resources sent from [facility] along with a Help in the Home resource. I Khris let Stella COBIANW know that I will look into see if Zahra has coverage for PCA services through a life insurance policy.- Can qualify for PCA services [facility]   is also checking with his [employer] to see if he is eligible for in home services.   My family will work with my PCP and care coordination team as we work to get home care in place. Continuous (MB)  My family will continue to outreach to care coordination as needed for additional resources or supports. Continuous (MB)                                 Plan/Recommendations: The patient will continue working with Care Coordination to achieve goal(s) as above. CHW will continue outreaches at minimum every 30 days and will involve Lead CC as needed or if patient is ready to move to Maintenance. Lead CC will continue to monitor CHW outreaches and patient's progress to goal(s) every 6 weeks.     Plan of Care updated and sent to patient: No

## 2024-05-28 ENCOUNTER — PATIENT OUTREACH (OUTPATIENT)
Dept: CARE COORDINATION | Facility: CLINIC | Age: 79
End: 2024-05-28
Payer: MEDICARE

## 2024-05-28 NOTE — PROGRESS NOTES
Clinic Care Coordination Contact  [facility]/Voicemail    Clinical Data: Care Coordinator Outreach    Outreach Documentation Number of Outreach Attempt   5/28/2024  10:55 AM 1     Left message on  patients son Khris's  voicemail with call back information and requested return call.    Plan: Care Coordinator will try to reach patient again in 10 business days.    Pratima [facility] Health Worker  [facility] Care Coordination   [facility], [place], [place], [facility]  Office: [phone]

## 2024-06-05 ENCOUNTER — PATIENT OUTREACH (OUTPATIENT)
Dept: CARE COORDINATION | Facility: CLINIC | Age: 79
End: 2024-06-05
Payer: MEDICARE

## 2024-06-05 NOTE — LETTER
[facility] CARE COORDINATION  [address] [address]   June 5, 2024        Zahra BILL Amilcar  [address] [address]          Dear Zahra,     Attached is an updated Patient Centered Plan of Care for your continued enrollment in Care Coordination. Please let us know if you have additional questions, concerns, or goals that we can assist with.    Sincerely,    Rianna Mariee [facility] Clinical Care Coordinator  [facility], [facility], and [facility]  Patient Centered Plan of Care  About Me:        Patient Name:  Zahra Fitzgerald    YOB: 1945  Age:         79 year old   [place] MRN:    [id number] Telephone Information:  Home Phone [phone]   Mobile [phone]       Address:  [address] [address] Email address:  No e-mail address on record      Emergency Contact(s)    Name Relationship Lgl Grd Work Phone Home Phone Mobile Phone   1. LUZ FITZGERALD* Spouse No  [phone]    2. LUZ FITZGERALD* Son No   [phone]   3. CHRIS FITZGERALD* Son No   [phone]           Primary language:  English     needed? No   [place] [facility]:  [phone] op. 1  Other communication barriers:None    Preferred Method of Communication:     Current living arrangement: I live in a private home with family    Mobility Status/ Medical Equipment: Independent w/Device        Health Maintenance  Health Maintenance Reviewed: Due/Overdue   Health Maintenance Due   Topic Date Due    HEPATITIS C SCREENING  Never done    RSV VACCINE (Pregnancy & 60+) (1 - 1-dose 60+ series) Never done    ZOSTER IMMUNIZATION (2 of 3) 02/21/2008    LIPID  10/18/2020    MEDICARE ANNUAL WELLNESS VISIT  07/13/2022    COVID-19 Vaccine (4 - 2023-24 season) 09/01/2023          My Access Plan  Medical Emergency 911   Primary Clinic Line [facility] - [phone]    24 Hour Appointment Line [phone] or  [phone]) (toll-free)   24 Hour Nurse Line [phone] (toll-free)   Preferred Urgent Care [facility], [phone]     [facility]  [phone]     Preferred Pharmacy [id number] [place], [place] - [address]     Behavioral Health Crisis Line The National Suicide Prevention Lifeline at 1-833.481.4462 or Text/Call 458           My Care Team Members  Patient Care Team         Relationship Specialty Notifications Start End    Kushal Motley MD PCP - General Internal Medicine  1/8/23     Phone: [phone] Fax: [phone]         [address] [place] [address]    Kushal Motley MD Assigned PCP   6/16/21     Phone: [phone] Fax: [phone]         [address] [place] [address]    Kushal Motley MD Physician Internal Medicine  7/8/21     Phone: [phone] Fax: [phone]         [address] [place] [address]    Rianna Mariee LICSW Lead Care Coordinator  Admissions 7/27/23     Phone: [phone]         Pratima Dorantes JR Community Health Worker  Admissions 8/1/23     Phone: [phone]         Jefry Julian DO Physician Neurology  8/4/23     Phone: [phone] Fax: [phone]         [address] [facility] [address]    Jefry Julian DO Assigned Neuroscience Provider   3/15/24     Phone: [phone] Fax: [phone]         [address] [facility] [address]                My Care Plans  Self Management and Treatment Plan    Care Plan  Care Plan: Help At Home       Problem: In-home support       Goal: Establish home support       Start Date: 8/1/2023 Expected End Date: 11/30/2023    Recent Progress: 40%    Note:     Goal Statement: I would like to look into home support over the next two months as well as plan for potential future living arrangements.   Barriers: application  timelines, Medicare Coverage  Strengths: accepting of help  Patient expressed understanding of goal: yes    Action steps to achieve this goal:  My family will review resources sent to me via email. Programs that are free to search, like realMyCabbage for Assisted Living. [facility] Care: https://PWA.Fixational/ and [employer] Connection: https://[place] Plus.Fixational/[place]/ . I Catalino Douglas son let the CHW that we got Stella name on the wait list for [facility] in [place] and we are continuing to look into other locations also.  My family will consider connecting with resources that are the best fit for me. CHW is sending me Khris the resources sent from [facility] along with a Help in the Home resource. I Khris let Stella CHW know that I will look into see if Zahra has coverage for PCA services through a life insurance policy.- Can qualify for PCA services [facility]  is also checking with his [employer] to see if he is eligible for in home services.   My family will work with my PCP and care coordination team as we work to get home care in place. Continuous (MB)  My family will continue to outreach to care coordination as needed for additional resources or supports. 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)    Diverticulitis of colon      Current Medications and Allergies:    Current Outpatient Medications   Medication Sig Dispense Refill    aspirin 81 mg chewable tablet Take 81 mg by mouth every evening      atenolol (TENORMIN) 25 MG tablet TAKE ONE TABLET BY MOUTH ONE TIME DAILY 90 tablet 3    black cohosh 540 mg cap Take 1 capsule by mouth every evening (Patient not taking: Reported on 4/2/2024)      calcium  carbonate 600 mg-vitamin D 400 units (CALTRATE) 600-400 MG-UNIT per tablet Take 1 tablet by mouth daily (Patient not taking: Reported on 4/2/2024)      FOLIC ACID/MULTIVITS-MIN/LUT (CENTRUM SILVER ORAL) Take 1 tablet by mouth daily (Patient not taking: Reported on 4/2/2024)      ondansetron (ZOFRAN ODT) 4 MG ODT tab Take 1 tablet (4 mg) by mouth every 8 hours as needed for nausea or vomiting 20 tablet 1    simvastatin (ZOCOR) 10 MG tablet TAKE ONE TABLET BY MOUTH AT BEDTIME 90 tablet 3    tiZANidine (ZANAFLEX) 4 MG tablet Take 1 tablet (4 mg) by mouth 3 times daily as needed for muscle spasms 10 tablet 0     Current Facility-Administered Medications   Medication Dose Route Frequency Provider Last Rate Last Admin    denosumab (PROLIA) injection 60 mg  60 mg Subcutaneous Q6 Months Kushal Motley MD   60 mg at 07/27/23 1122     No Known Allergies     Care Coordination Start Date: 7/24/2023   Frequency of Care Coordination: monthly, more frequently as needed     Form Last Updated: 06/05/2024

## 2024-06-05 NOTE — PROGRESS NOTES
Clinic Care Coordination Contact  Care Coordination Clinician Chart Review    Situation: Patient chart reviewed by Care Coordinator.       Background: Care Coordination Program started: 7/24/2023. Initial assessment completed and patient-centered care plan(s) were developed with participation from patient. Lead CC handed patient off to CHW for continued outreaches.       Assessment: Per chart review, patient outreach completed by CC CHW on 5/28/24 - [facility], CHWCC will outreach again in about 10 business days.  Patient is not actively working to accomplish goal(s). Patient's goal(s) appropriate and relevant at this time. Patient is due for updated Plan of Care.  Assessments will be completed annually or as needed/with change of patient status.      Care Plan: Help At Home       Problem: In-home support       Goal: Establish home support       Start Date: 8/1/2023 Expected End Date: 11/30/2023    Recent Progress: 40%    Note:     Goal Statement: I would like to look into home support over the next two months as well as plan for potential future living arrangements.   Barriers: application timelines, Medicare Coverage  Strengths: accepting of help  Patient expressed understanding of goal: yes    Action steps to achieve this goal:  My family will review resources sent to me via email. Programs that are free to search, like realtors for Assisted Living. [facility] Care: https://eMoov.[employer]/ and NComputing Connection: https://Boticca.[employer]/[place]/ . I Catalino Douglas son let the CHW that we got Stella name on the wait list for [facility] in [place] and we are continuing to look into other locations also.  My family will consider connecting with resources that are the best fit for me. CHW is sending me Khris the resources sent from [facility] along with a Help in the Home resource. I Khris let Stella CHW know that I will look into see if Zahra has coverage for PCA services through a life insurance  policy.- Can qualify for PCA services [facility]  is also checking with his [employer] to see if he is eligible for in home services.   My family will work with my PCP and care coordination team as we work to get home care in place. Continuous (MB)  My family will continue to outreach to care coordination as needed for additional resources or supports. Continuous (MB)                                 Plan/Recommendations: The patient will continue working with Care Coordination to achieve goal(s) as above. CHW will continue outreaches at minimum every 30 days and will involve Lead CC as needed or if patient is ready to move to Maintenance. Lead CC will continue to monitor CHW outreaches and patient's progress to goal(s) every 6 weeks.     Plan of Care updated and sent to patient: Yes, via mail

## 2024-06-12 ENCOUNTER — PATIENT OUTREACH (OUTPATIENT)
Dept: CARE COORDINATION | Facility: CLINIC | Age: 79
End: 2024-06-12
Payer: MEDICARE

## 2024-06-12 NOTE — PROGRESS NOTES
Clinic Care Coordination Contact  [facility]/Voicemail    Clinical Data: Care Coordinator Outreach    Outreach Documentation Number of Outreach Attempt   5/28/2024  10:55 AM 1   6/12/2024  10:24 AM 2     Left message on  patients son Khris's  voicemail with call back information and requested return call.    Plan: CHW sending chart to CCC SW to review for disenrollment.    Pratima Dorantes  [facility] Health Worker  [facility] Care Coordination   [place], [place], [place], [facility]  Office: [phone]

## 2024-06-13 ENCOUNTER — PATIENT OUTREACH (OUTPATIENT)
Dept: CARE COORDINATION | Facility: CLINIC | Age: 79
End: 2024-06-13
Payer: MEDICARE

## 2024-06-13 NOTE — LETTER
[facility] CARE COORDINATION  [address] [address]   June 13, 2024    Zahra Fitzgerald  [address] [address]      Dear Zahra,    I have been unsuccessful in reaching you since our last contact. At this time the Care Coordination team will make no further attempts to reach you, however this does not change your ability to continue receiving care from your providers at your primary care clinic.     All of us at the [facility] are invested in your health and are here to assist you in meeting your goals.     Sincerely,    Rianna Mariee, [facility] Clinical Care Coordinator  [facility], [facility], [place], [facility], and [facility]

## 2024-06-13 NOTE — PROGRESS NOTES
Clinic Care Coordination Contact  Clinic Care Coordination Contact    Situation: Patient chart reviewed by care coordinator.    Background: Pt is enrolled in care coordination and followed to assist with goal(s) progression. Chart routed to [facility] by W for review to determine eligibility of diserolling from Care Coordination per standard work.     Assessment: Per Chart Review pt has been unreachable for follow up x2 with no further engagement or return calls.     Plan/Recommendations: Per Care Coordination standard work pt will be disenrolled from Care Coordination. Care Coordinator sent disenrollment letter with care coordinator contact information via mail. Care Coordinator will remain available, however, will do no further outreaches at this time unless a new referral is made or a change it pt status occurs. Pt has been provided with this writer's contact information and has been encouraged to call with any questions.

## 2024-07-27 ENCOUNTER — NURSE TRIAGE (OUTPATIENT)
Dept: NURSING | Facility: CLINIC | Age: 79
End: 2024-07-27
Payer: COMMERCIAL

## 2024-07-27 ENCOUNTER — HOSPITAL ENCOUNTER (EMERGENCY)
Facility: CLINIC | Age: 79
Discharge: HOME | End: 2024-07-28
Attending: EMERGENCY MEDICINE | Admitting: EMERGENCY MEDICINE
Payer: MEDICARE

## 2024-07-27 DIAGNOSIS — R11.2 NAUSEA AND VOMITING, UNSPECIFIED VOMITING TYPE: ICD-10-CM

## 2024-07-27 LAB
ATRIAL RATE - MUSE: 84 BPM
DIASTOLIC BLOOD PRESSURE - MUSE: 88 MMHG
INTERPRETATION ECG - MUSE: NORMAL
P AXIS - MUSE: 33 DEGREES
PR INTERVAL - MUSE: 152 MS
QRS DURATION - MUSE: 70 MS
QT - MUSE: 390 MS
QTC - MUSE: 460 MS
R AXIS - MUSE: 44 DEGREES
SYSTOLIC BLOOD PRESSURE - MUSE: 139 MMHG
T AXIS - MUSE: 39 DEGREES
VENTRICULAR RATE- MUSE: 84 BPM

## 2024-07-27 PROCEDURE — 99284 EMERGENCY DEPT VISIT MOD MDM: CPT | Mod: 25

## 2024-07-27 PROCEDURE — 36415 COLL VENOUS BLD VENIPUNCTURE: CPT | Performed by: EMERGENCY MEDICINE

## 2024-07-27 PROCEDURE — 83735 ASSAY OF MAGNESIUM: CPT | Performed by: EMERGENCY MEDICINE

## 2024-07-27 PROCEDURE — 93005 ELECTROCARDIOGRAM TRACING: CPT | Performed by: EMERGENCY MEDICINE

## 2024-07-27 PROCEDURE — 85027 COMPLETE CBC AUTOMATED: CPT | Performed by: EMERGENCY MEDICINE

## 2024-07-27 PROCEDURE — 84484 ASSAY OF TROPONIN QUANT: CPT | Performed by: EMERGENCY MEDICINE

## 2024-07-27 PROCEDURE — 82040 ASSAY OF SERUM ALBUMIN: CPT | Performed by: EMERGENCY MEDICINE

## 2024-07-27 RX ORDER — SODIUM CHLORIDE 9 MG/ML
INJECTION, SOLUTION INTRAVENOUS CONTINUOUS
Status: DISCONTINUED | OUTPATIENT
Start: 2024-07-27 | End: 2024-07-28 | Stop reason: HOSPADM

## 2024-07-27 RX ORDER — ONDANSETRON 2 MG/ML
4 INJECTION INTRAMUSCULAR; INTRAVENOUS ONCE
Status: COMPLETED | OUTPATIENT
Start: 2024-07-27 | End: 2024-07-28

## 2024-07-28 VITALS
DIASTOLIC BLOOD PRESSURE: 57 MMHG | RESPIRATION RATE: 19 BRPM | TEMPERATURE: 97.5 F | HEART RATE: 78 BPM | OXYGEN SATURATION: 95 % | SYSTOLIC BLOOD PRESSURE: 117 MMHG

## 2024-07-28 LAB
ALBUMIN SERPL BCG-MCNC: 4.3 G/DL (ref 3.5–5.2)
ALBUMIN UR-MCNC: 10 MG/DL
ALP SERPL-CCNC: 89 U/L (ref 40–150)
ALT SERPL W P-5'-P-CCNC: 9 U/L (ref 0–50)
ANION GAP SERPL CALCULATED.3IONS-SCNC: 8 MMOL/L (ref 7–15)
APPEARANCE UR: ABNORMAL
AST SERPL W P-5'-P-CCNC: 26 U/L (ref 0–45)
BACTERIA #/AREA URNS HPF: ABNORMAL /HPF
BILIRUB SERPL-MCNC: 0.4 MG/DL
BILIRUB UR QL STRIP: NEGATIVE
BUN SERPL-MCNC: 21.4 MG/DL (ref 8–23)
CALCIUM SERPL-MCNC: 10.7 MG/DL (ref 8.8–10.4)
CHLORIDE SERPL-SCNC: 103 MMOL/L (ref 98–107)
COLOR UR AUTO: YELLOW
CREAT SERPL-MCNC: 1.08 MG/DL (ref 0.51–0.95)
EGFRCR SERPLBLD CKD-EPI 2021: 52 ML/MIN/1.73M2
ERYTHROCYTE [DISTWIDTH] IN BLOOD BY AUTOMATED COUNT: 12.9 % (ref 10–15)
GLUCOSE SERPL-MCNC: 164 MG/DL (ref 70–99)
GLUCOSE UR STRIP-MCNC: NEGATIVE MG/DL
HCO3 SERPL-SCNC: 30 MMOL/L (ref 22–29)
HCT VFR BLD AUTO: 38.6 % (ref 35–47)
HGB BLD-MCNC: 13 G/DL (ref 11.7–15.7)
HGB UR QL STRIP: ABNORMAL
HYALINE CASTS: 15 /LPF
KETONES UR STRIP-MCNC: 10 MG/DL
LEUKOCYTE ESTERASE UR QL STRIP: NEGATIVE
MAGNESIUM SERPL-MCNC: 2.1 MG/DL (ref 1.7–2.3)
MCH RBC QN AUTO: 31.3 PG (ref 26.5–33)
MCHC RBC AUTO-ENTMCNC: 33.7 G/DL (ref 31.5–36.5)
MCV RBC AUTO: 93 FL (ref 78–100)
MUCOUS THREADS #/AREA URNS LPF: PRESENT /LPF
NITRATE UR QL: POSITIVE
PH UR STRIP: 5.5 [PH] (ref 5–7)
PLATELET # BLD AUTO: 245 10E3/UL (ref 150–450)
POTASSIUM SERPL-SCNC: 4.4 MMOL/L (ref 3.4–5.3)
PROT SERPL-MCNC: 8 G/DL (ref 6.4–8.3)
RBC # BLD AUTO: 4.15 10E6/UL (ref 3.8–5.2)
RBC URINE: 2 /HPF
SODIUM SERPL-SCNC: 141 MMOL/L (ref 135–145)
SP GR UR STRIP: 1.02 (ref 1–1.03)
SQUAMOUS EPITHELIAL: 1 /HPF
TROPONIN T SERPL HS-MCNC: 18 NG/L
UROBILINOGEN UR STRIP-MCNC: <2 MG/DL
WBC # BLD AUTO: 7.3 10E3/UL (ref 4–11)
WBC URINE: 2 /HPF

## 2024-07-28 PROCEDURE — 81001 URINALYSIS AUTO W/SCOPE: CPT | Performed by: EMERGENCY MEDICINE

## 2024-07-28 PROCEDURE — 250N000011 HC RX IP 250 OP 636: Performed by: EMERGENCY MEDICINE

## 2024-07-28 PROCEDURE — 96361 HYDRATE IV INFUSION ADD-ON: CPT

## 2024-07-28 PROCEDURE — 96374 THER/PROPH/DIAG INJ IV PUSH: CPT

## 2024-07-28 PROCEDURE — 258N000003 HC RX IP 258 OP 636: Performed by: EMERGENCY MEDICINE

## 2024-07-28 PROCEDURE — 87086 URINE CULTURE/COLONY COUNT: CPT | Performed by: EMERGENCY MEDICINE

## 2024-07-28 RX ORDER — ONDANSETRON 4 MG/1
4 TABLET, ORALLY DISINTEGRATING ORAL EVERY 8 HOURS PRN
Qty: 10 TABLET | Refills: 0 | Status: SHIPPED | OUTPATIENT
Start: 2024-07-28 | End: 2024-07-31

## 2024-07-28 RX ADMIN — SODIUM CHLORIDE: 9 INJECTION, SOLUTION INTRAVENOUS at 00:00

## 2024-07-28 RX ADMIN — ONDANSETRON 4 MG: 2 INJECTION INTRAMUSCULAR; INTRAVENOUS at 00:00

## 2024-07-28 ASSESSMENT — ACTIVITIES OF DAILY LIVING (ADL): ADLS_ACUITY_SCORE: 38

## 2024-07-28 NOTE — ED PROVIDER NOTES
EMERGENCY DEPARTMENT ENCOUNTER      NAME: Zahra Fitzgerald  AGE: 79 year old female  YOB: 1945  MRN: [id number]  EVALUATION DATE & TIME: No admission date for patient encounter.    PCP: Kushal Motley    ED PROVIDER: Marquise Faye M.D.      Chief Complaint   Patient presents with    Nausea & Vomiting         FINAL IMPRESSION:  Nausea and vomiting  Dementia      ED COURSE & MEDICAL DECISION MAKING:    Pertinent Labs & Imaging studies reviewed. (See chart for details)  79 year old female presents to the Emergency Department for evaluation of nausea and vomiting.  Patient arrives with her son and .  Per report she had sudden onset of vomiting just prior to his arrival to help get her into bed at night.  Patient with moderate dementia.  Unable to provide much of the history.  Per report no unusual exposures or ingestions.   is feeling well..  Was also worried that her blood pressure was quite high at home at around 170 systolic.  Patient appears non toxic with stable vitals signs.    11:55 PM I met with the patient for the initial interview and physical examination. Discussed plan for treatment and workup in the ED.    12:20 AM.  Physical completed.  Patient is pleasant and cooperative with exam.  Some slight erythema to the underside of the chin but is nontender and not indurated.  Faint erythema along the lateral aspects of the arms.  Also nontender.  Mild lower extremity edema.  Remainder exam unremarkable  1 AM.  Troponin minimally elevated 18 noncontributory.  Urine with possible infection she is nitrite positive but leukocyte Estrace negative.  Few bacteria but no significant leukocytosis.  Will await culture results prior to initiating therapy.  Magnesium normal at 2.1.  CBC is unremarkable.  Calcium mildly elevated 10.7.  Creatinine minimally elevated 1.08.  Electrolytes and liver function test normal.  Patient with underlying dementia and onset of nausea vomiting without  evidence of significant illness on exam nor laboratory evaluation.  Will continue outpatient management with Zofran.  Did discuss possibility of needing antibiotics for urinary tract infection if her culture results return positive  At the conclusion of the encounter I discussed the results of all of the tests and the disposition. The questions were answered and return precautions provided. The patient or family acknowledged understanding and was agreeable with the care plan.         MEDICATIONS GIVEN IN THE EMERGENCY:  Medications - No data to display    NEW PRESCRIPTIONS STARTED AT TODAY'S ER VISIT  Current Discharge Medication List        START taking these medications    Details   !! ondansetron (ZOFRAN ODT) 4 MG ODT tab Take 1 tablet (4 mg) by mouth every 8 hours as needed  Qty: 10 tablet, Refills: 0       !! - Potential duplicate medications found. Please discuss with provider.              =================================================================    HPI    Patient information was obtained from: patient's  and son    Use of Intrepreter: Veronica Sweeney BILL Fitzgerald is a 79 year old female with a pertient medical history of diverticulosis, hyperlipidemia, HTN, osteoporosis, severe late onset Alzheimer's dementia who presents to the ED for evaluation of nausea and vomiting.     Per chart review,  Patient called [facility] on 7/27/24 regarding emesis. Pt's  on line state symptoms start this evening. She wouldn't eat her dinner and when he went to check on her, she started throwing up. Reports patient has dementia so hard to decipher how she is feeling. Reports emesis x6 in the last 2 hours. Endorse has a rash the size of a dollar bill on arms and chest. It is red, flat, and blotchy in appearance. Most recent BP is 161/94. HR 86. Recommend go to ED immediately.     Per patient's son,  He received a call from his brother (patient's son) that patient had redness on  patient's skin and a high blood pressure. Upon his arrival, he reports patient had redness to the skin, vomiting a pint size, and blood pressure was 174/101. He reports he usually attends to the patient's house every night to help his dad put patient into bed. He denies any discomfort, trouble with vomiting before, diarrhea, urination frequency, or changes in medication.  reports normal appetite today.       REVIEW OF SYSTEMS   Constitutional:  Denies fever, chills  Respiratory:  Denies productive cough or increased work of breathing  Cardiovascular:  Denies chest pain, palpitations  GI: Positive for vomiting, Denies abdominal pain, nausea, or change in bowel or bladder habits   Musculoskeletal:  Denies any new muscle/joint swelling  Skin:  Positive for rash   Neurologic:  Denies focal weakness  All systems negative except as marked.     PAST MEDICAL HISTORY:  No past medical history on file.    PAST SURGICAL HISTORY:  Past Surgical History:   Procedure Laterality Date    [facility] APPENDECTOMY      Description: Appendectomy;  Recorded: 03/12/2009;         CURRENT MEDICATIONS:      Current Facility-Administered Medications:     denosumab (PROLIA) injection 60 mg, 60 mg, Subcutaneous, Q6 Months, Kushal Motley MD, 60 mg at 07/27/23 1122    Current Outpatient Medications:     aspirin 81 mg chewable tablet, Take 81 mg by mouth every evening, Disp: , Rfl:     atenolol (TENORMIN) 25 MG tablet, TAKE ONE TABLET BY MOUTH ONE TIME DAILY, Disp: 90 tablet, Rfl: 3    black cohosh 540 mg cap, Take 1 capsule by mouth every evening (Patient not taking: Reported on 4/2/2024), Disp: , Rfl:     calcium carbonate 600 mg-vitamin D 400 units (CALTRATE) 600-400 MG-UNIT per tablet, Take 1 tablet by mouth daily (Patient not taking: Reported on 4/2/2024), Disp: , Rfl:     FOLIC ACID/MULTIVITS-MIN/LUT (CENTRUM SILVER ORAL), Take 1 tablet by mouth daily (Patient not taking: Reported on 4/2/2024), Disp: , Rfl:     ondansetron (ZOFRAN ODT) 4  MG ODT tab, Take 1 tablet (4 mg) by mouth every 8 hours as needed for nausea or vomiting, Disp: 20 tablet, Rfl: 1    simvastatin (ZOCOR) 10 MG tablet, TAKE ONE TABLET BY MOUTH AT BEDTIME, Disp: 90 tablet, Rfl: 3    tiZANidine (ZANAFLEX) 4 MG tablet, Take 1 tablet (4 mg) by mouth 3 times daily as needed for muscle spasms, Disp: 10 tablet, Rfl: 0    ALLERGIES:  No Known Allergies    FAMILY HISTORY:  Family History   Problem Relation Age of Onset    Breast Cancer Mother         Unsure of age    Breast Cancer Maternal Aunt 65.00    Breast Cancer Maternal Aunt 65.00    Breast Cancer Sister 42.00       SOCIAL HISTORY:   Social History     Socioeconomic History    Marital status:    Tobacco Use    Smoking status: Never     Passive exposure: Never    Smokeless tobacco: Never   Vaping Use    Vaping status: Never Used       VITALS:  Patient Vitals for the past 24 hrs:   BP Temp Temp src Pulse Resp SpO2   07/27/24 2258 (!) 185/88 97.5  F (36.4  C) Temporal 86 20 97 %        PHYSICAL EXAM    Constitutional:  Awake, alert, in no apparent distress  HENT:  Normocephalic, Atraumatic. Bilateral external ears normal. Oropharynx moist. Nose normal. Neck- Normal range of motion with no guarding, No midline cervical tenderness, Supple, No stridor.  Erythema to the submandibular area which is nontender and nonindurated.  Eyes:  PERRL, EOMI with no signs of entrapment, Conjunctiva normal, No discharge.   Respiratory:  Normal breath sounds, No respiratory distress, No wheezing.    Cardiovascular:  Normal heart rate, Normal rhythm, No appreciable rubs or gallops.   GI:  Soft, No tenderness, No distension, No palpable masses  Musculoskeletal: Mild bilateral lower extremity edema. Good range of motion in all major joints. No tenderness to palpation or major deformities noted.  Bruising noted to right elbow.  Nontender.  Elbow supple  Integument:  Warm, Dry, No erythema, No rash.   Neurologic:  Alert to person, Normal motor function,  Normal sensory function, No focal deficits noted.   LAB:  All pertinent labs reviewed and interpreted.     Results for orders placed or performed during the hospital encounter of 07/27/24   Comprehensive metabolic panel     Status: Abnormal   Result Value Ref Range    Sodium 141 135 - 145 mmol/L    Potassium 4.4 3.4 - 5.3 mmol/L    Carbon Dioxide (CO2) 30 (H) 22 - 29 mmol/L    Anion Gap 8 7 - 15 mmol/L    Urea Nitrogen 21.4 8.0 - 23.0 mg/dL    Creatinine 1.08 (H) 0.51 - 0.95 mg/dL    GFR Estimate 52 (L) >60 mL/min/1.73m2    Calcium 10.7 (H) 8.8 - 10.4 mg/dL    Chloride 103 98 - 107 mmol/L    Glucose 164 (H) 70 - 99 mg/dL    Alkaline Phosphatase 89 40 - 150 U/L    AST 26 0 - 45 U/L    ALT 9 0 - 50 U/L    Protein Total 8.0 6.4 - 8.3 g/dL    Albumin 4.3 3.5 - 5.2 g/dL    Bilirubin Total 0.4 <=1.2 mg/dL   Magnesium     Status: Normal   Result Value Ref Range    Magnesium 2.1 1.7 - 2.3 mg/dL   UA with Microscopic reflex to Culture     Status: Abnormal    Specimen: Urine, Catheter   Result Value Ref Range    Color Urine Yellow Colorless, Straw, Light Yellow, Yellow    Appearance Urine Turbid (A) Clear    Glucose Urine Negative Negative mg/dL    Bilirubin Urine Negative Negative    Ketones Urine 10 (A) Negative mg/dL    Specific Gravity Urine 1.021 1.001 - 1.030    Blood Urine 0.03 mg/dL (A) Negative    pH Urine 5.5 5.0 - 7.0    Protein Albumin Urine 10 (A) Negative mg/dL    Urobilinogen Urine <2.0 <2.0 mg/dL    Nitrite Urine Positive (A) Negative    Leukocyte Esterase Urine Negative Negative    Bacteria Urine Few (A) None Seen /HPF    Mucus Urine Present (A) None Seen /LPF    RBC Urine 2 <=2 /HPF    WBC Urine 2 <=5 /HPF    Squamous Epithelials Urine 1 <=1 /HPF    Hyaline Casts Urine 15 (H) <=2 /LPF    Narrative    Urine Culture ordered based on laboratory criteria   CBC (+ platelets, no diff)     Status: Normal   Result Value Ref Range    WBC Count 7.3 4.0 - 11.0 10e3/uL    RBC Count 4.15 3.80 - 5.20 10e6/uL    Hemoglobin  13.0 11.7 - 15.7 g/dL    Hematocrit 38.6 35.0 - 47.0 %    MCV 93 78 - 100 fL    MCH 31.3 26.5 - 33.0 pg    MCHC 33.7 31.5 - 36.5 g/dL    RDW 12.9 10.0 - 15.0 %    Platelet Count 245 150 - 450 10e3/uL   Troponin T, High Sensitivity (now)     Status: Abnormal   Result Value Ref Range    Troponin T, High Sensitivity 18 (H) <=14 ng/L   ECG 12-LEAD WITH MUSE (LHE)     Status: None   Result Value Ref Range    Systolic Blood Pressure 139 mmHg    Diastolic Blood Pressure 88 mmHg    Ventricular Rate 84 BPM    Atrial Rate 84 BPM    NV Interval 152 ms    QRS Duration 70 ms     ms    QTc 460 ms    P Axis 33 degrees    R AXIS 44 degrees    T Axis 39 degrees    Interpretation ECG       Sinus rhythm  Normal ECG  When compared with ECG of 20-Oct-2023 02:23,  No significant change was found  Confirmed by SEE ED PROVIDER NOTE FOR, ECG INTERPRETATION ([id number]),  POOJA DIALLO ([id number]) on 7/27/2024 11:55:11 PM          RADIOLOGY:  Reviewed all pertinent imaging. Please see official radiology report.  No orders to display       EKG:    Normal sinus rhythm.  Rate of 84.  Normal QRS.  Normal ST segment.  Normal EKG.  Unchanged compared to October 20, 2023  I have independently reviewed and interpreted the EKG(s) documented above.      I, Janel Bowman, am serving as a scribe to document services personally performed by Marquise Faye MD, based on my observation and the provider's statements to me. I, Marquise Faye MD attest that Janel Bowman is acting in a scribe capacity, has observed my performance of the services and has documented them in accordance with my direction.    Marquise Faye M.D.  Emergency Medicine  [facility] EMERGENCY ROOM     Marquise Faye MD  07/29/24 4935

## 2024-07-28 NOTE — ED TRIAGE NOTES
Pt with vomiting x4 hours. Per pt's son she has had elevated BP and has been shaking as well. Pt has advanced dementia and is unable to make needs or symptoms known. No obvious distress noted.     Triage Assessment (Adult)       Row Name 07/27/24 1891          Triage Assessment    Airway WDL WDL        Respiratory WDL    Respiratory WDL WDL        Skin Circulation/Temperature WDL    Skin Circulation/Temperature WDL WDL        Cardiac WDL    Cardiac WDL WDL        Peripheral/Neurovascular WDL    Peripheral Neurovascular WDL WDL        Cognitive/Neuro/Behavioral WDL    Cognitive/Neuro/Behavioral WDL X

## 2024-07-28 NOTE — ED NOTES
AIDET performed, white board updated for rounding. Patient updated on plan of care. Patient's pain assessed. Call light within reach, bed in low position, side rails up. Visitor at bedside:  and son.

## 2024-07-28 NOTE — TELEPHONE ENCOUNTER
Nurse Triage SBAR    Is this a 2nd Level Triage? YES, LICENSED PRACTITIONER REVIEW IS REQUIRED    Situation:  Emesis & Rash    Background:  Pt's  on the line stating symptoms started this evening. He states pt wouldn't eat her dinner and when he went to check on her she started throwing up. Reports pt has dementia so it can be hard to decipher how she is feeling.     Assessment:  Reports emesis x6 in the last 2 hrs. Reports pt also has a rash the size of a dollar bill on her arms and her chest. States it is red, flat, and blotchy in appearance. Reports most recent BP is 161/94, HR 86, and states pt doesn't have a fever. Reports pt's hands shake more when she is sick like this.    Protocol Recommended Disposition:   Go to ED Now (Or PCP Triage)    Recommendation: 2LT, paged Dr. Condon at 2205.    MD advised:   - ED now for fluids and antiemetic     Called pt's spouse back at 2208 and discussed the above recommendations. He states they will take her in now. Protocol and care advice reviewed. Advised to call back with any new or worsening signs, symptoms, concerns, or questions. They verbalized understanding and agreed to follow advice given.    Paged to provider    Does the patient meet one of the following criteria for ADS visit consideration? 16+ years old, with an [facility] PCP     TIP  Providers, please consider if this condition is appropriate for management at one of our Acute and Diagnostic Services sites.     If patient is a good candidate, please use dotphrase <dot>triageresponse and select Refer to ADS to document.    Reason for Disposition   [1] MODERATE vomiting (e.g., 3 - 5 times/day) AND [2] age > 60 years    Additional Information   Negative: Shock suspected (e.g., cold/pale/clammy skin, too weak to stand, low BP, rapid pulse)   Negative: Difficult to awaken or acting confused (e.g., disoriented, slurred speech)   Negative: Sounds like a life-threatening emergency to the triager   Negative: [1]  "Vomiting AND [2] contains red blood or black (\"coffee ground\") material  (Exception: Few red streaks in vomit that only happened once.)   Negative: Severe pain in one eye   Negative: Recent head injury (within last 3 days)   Negative: Recent abdominal injury (within last 3 days)   Negative: [1] Insulin-dependent diabetes (Type I) AND [2] glucose > 400 mg/dl (22 mmol/l)   Negative: [1] Vomiting AND [2] hernia is more painful or swollen than usual   Negative: [1] SEVERE vomiting (e.g., 6 or more times/day) AND [2] present > 8 hours (Exception: Patient sounds well, is drinking liquids, does not sound dehydrated, and vomiting has lasted less than 24 hours.)    Protocols used: Vomiting-A-    Sariah Mason RN on 7/27/2024 at 10:18 PM    "

## 2024-07-30 ENCOUNTER — APPOINTMENT (OUTPATIENT)
Dept: ULTRASOUND IMAGING | Facility: CLINIC | Age: 79
End: 2024-07-30
Attending: EMERGENCY MEDICINE
Payer: MEDICARE

## 2024-07-30 ENCOUNTER — TELEPHONE (OUTPATIENT)
Dept: INTERNAL MEDICINE | Facility: CLINIC | Age: 79
End: 2024-07-30

## 2024-07-30 ENCOUNTER — APPOINTMENT (OUTPATIENT)
Dept: CT IMAGING | Facility: CLINIC | Age: 79
End: 2024-07-30
Attending: EMERGENCY MEDICINE
Payer: MEDICARE

## 2024-07-30 ENCOUNTER — VIRTUAL VISIT (OUTPATIENT)
Dept: NEUROLOGY | Facility: CLINIC | Age: 79
End: 2024-07-30
Payer: MEDICARE

## 2024-07-30 ENCOUNTER — HOSPITAL ENCOUNTER (EMERGENCY)
Facility: CLINIC | Age: 79
Discharge: HOME | End: 2024-07-30
Attending: EMERGENCY MEDICINE | Admitting: EMERGENCY MEDICINE
Payer: MEDICARE

## 2024-07-30 VITALS
WEIGHT: 155 LBS | OXYGEN SATURATION: 97 % | BODY MASS INDEX: 25.83 KG/M2 | HEART RATE: 79 BPM | TEMPERATURE: 98.1 F | DIASTOLIC BLOOD PRESSURE: 50 MMHG | SYSTOLIC BLOOD PRESSURE: 94 MMHG | RESPIRATION RATE: 16 BRPM | HEIGHT: 65 IN

## 2024-07-30 DIAGNOSIS — I82.413 ACUTE DEEP VEIN THROMBOSIS (DVT) OF FEMORAL VEIN OF BOTH LOWER EXTREMITIES (H): ICD-10-CM

## 2024-07-30 DIAGNOSIS — F02.C3 SEVERE LATE ONSET ALZHEIMER'S DEMENTIA WITH MOOD DISTURBANCE (H): Primary | ICD-10-CM

## 2024-07-30 DIAGNOSIS — G30.1 SEVERE LATE ONSET ALZHEIMER'S DEMENTIA WITH MOOD DISTURBANCE (H): Primary | ICD-10-CM

## 2024-07-30 LAB
ALBUMIN SERPL BCG-MCNC: 3.9 G/DL (ref 3.5–5.2)
ALP SERPL-CCNC: 79 U/L (ref 40–150)
ALT SERPL W P-5'-P-CCNC: <5 U/L (ref 0–50)
ANION GAP SERPL CALCULATED.3IONS-SCNC: 13 MMOL/L (ref 7–15)
AST SERPL W P-5'-P-CCNC: 27 U/L (ref 0–45)
BACTERIA UR CULT: ABNORMAL
BASOPHILS # BLD AUTO: 0.1 10E3/UL (ref 0–0.2)
BASOPHILS NFR BLD AUTO: 1 %
BILIRUB SERPL-MCNC: 0.3 MG/DL
BUN SERPL-MCNC: 21.3 MG/DL (ref 8–23)
CALCIUM SERPL-MCNC: 10.1 MG/DL (ref 8.8–10.4)
CHLORIDE SERPL-SCNC: 105 MMOL/L (ref 98–107)
CREAT SERPL-MCNC: 1.16 MG/DL (ref 0.51–0.95)
EGFRCR SERPLBLD CKD-EPI 2021: 48 ML/MIN/1.73M2
EOSINOPHIL # BLD AUTO: 0.2 10E3/UL (ref 0–0.7)
EOSINOPHIL NFR BLD AUTO: 2 %
ERYTHROCYTE [DISTWIDTH] IN BLOOD BY AUTOMATED COUNT: 12.7 % (ref 10–15)
GLUCOSE SERPL-MCNC: 105 MG/DL (ref 70–99)
HCO3 SERPL-SCNC: 24 MMOL/L (ref 22–29)
HCT VFR BLD AUTO: 35.9 % (ref 35–47)
HGB BLD-MCNC: 12 G/DL (ref 11.7–15.7)
IMM GRANULOCYTES # BLD: 0 10E3/UL
IMM GRANULOCYTES NFR BLD: 0 %
LACTATE SERPL-SCNC: 1.9 MMOL/L (ref 0.7–2)
LIPASE SERPL-CCNC: 51 U/L (ref 13–60)
LYMPHOCYTES # BLD AUTO: 2.2 10E3/UL (ref 0.8–5.3)
LYMPHOCYTES NFR BLD AUTO: 28 %
MCH RBC QN AUTO: 31.2 PG (ref 26.5–33)
MCHC RBC AUTO-ENTMCNC: 33.4 G/DL (ref 31.5–36.5)
MCV RBC AUTO: 93 FL (ref 78–100)
MONOCYTES # BLD AUTO: 0.8 10E3/UL (ref 0–1.3)
MONOCYTES NFR BLD AUTO: 10 %
NEUTROPHILS # BLD AUTO: 4.7 10E3/UL (ref 1.6–8.3)
NEUTROPHILS NFR BLD AUTO: 60 %
NRBC # BLD AUTO: 0 10E3/UL
NRBC BLD AUTO-RTO: 0 /100
PLATELET # BLD AUTO: 209 10E3/UL (ref 150–450)
POTASSIUM SERPL-SCNC: 3.9 MMOL/L (ref 3.4–5.3)
PROT SERPL-MCNC: 7 G/DL (ref 6.4–8.3)
RBC # BLD AUTO: 3.85 10E6/UL (ref 3.8–5.2)
SODIUM SERPL-SCNC: 142 MMOL/L (ref 135–145)
WBC # BLD AUTO: 7.9 10E3/UL (ref 4–11)

## 2024-07-30 PROCEDURE — 250N000011 HC RX IP 250 OP 636: Performed by: EMERGENCY MEDICINE

## 2024-07-30 PROCEDURE — 83690 ASSAY OF LIPASE: CPT | Performed by: EMERGENCY MEDICINE

## 2024-07-30 PROCEDURE — 258N000003 HC RX IP 258 OP 636: Performed by: EMERGENCY MEDICINE

## 2024-07-30 PROCEDURE — 96374 THER/PROPH/DIAG INJ IV PUSH: CPT | Mod: 59

## 2024-07-30 PROCEDURE — 36415 COLL VENOUS BLD VENIPUNCTURE: CPT | Performed by: EMERGENCY MEDICINE

## 2024-07-30 PROCEDURE — 83605 ASSAY OF LACTIC ACID: CPT | Performed by: EMERGENCY MEDICINE

## 2024-07-30 PROCEDURE — 74177 CT ABD & PELVIS W/CONTRAST: CPT | Mod: MG

## 2024-07-30 PROCEDURE — 99214 OFFICE O/P EST MOD 30 MIN: CPT | Mod: 95 | Performed by: PSYCHIATRY & NEUROLOGY

## 2024-07-30 PROCEDURE — 96376 TX/PRO/DX INJ SAME DRUG ADON: CPT

## 2024-07-30 PROCEDURE — 96375 TX/PRO/DX INJ NEW DRUG ADDON: CPT

## 2024-07-30 PROCEDURE — 99285 EMERGENCY DEPT VISIT HI MDM: CPT | Mod: 25

## 2024-07-30 PROCEDURE — 85025 COMPLETE CBC W/AUTO DIFF WBC: CPT | Performed by: EMERGENCY MEDICINE

## 2024-07-30 PROCEDURE — 93971 EXTREMITY STUDY: CPT | Mod: RT

## 2024-07-30 PROCEDURE — 96361 HYDRATE IV INFUSION ADD-ON: CPT

## 2024-07-30 PROCEDURE — 80053 COMPREHEN METABOLIC PANEL: CPT | Performed by: EMERGENCY MEDICINE

## 2024-07-30 PROCEDURE — 250N000013 HC RX MED GY IP 250 OP 250 PS 637: Performed by: EMERGENCY MEDICINE

## 2024-07-30 RX ORDER — IOPAMIDOL 755 MG/ML
75 INJECTION, SOLUTION INTRAVASCULAR ONCE
Status: COMPLETED | OUTPATIENT
Start: 2024-07-30 | End: 2024-07-30

## 2024-07-30 RX ORDER — KETOROLAC TROMETHAMINE 15 MG/ML
15 INJECTION, SOLUTION INTRAMUSCULAR; INTRAVENOUS ONCE
Status: COMPLETED | OUTPATIENT
Start: 2024-07-30 | End: 2024-07-30

## 2024-07-30 RX ORDER — OXYCODONE HYDROCHLORIDE 5 MG/1
5 TABLET ORAL EVERY 4 HOURS PRN
Qty: 12 TABLET | Refills: 0 | Status: SHIPPED | OUTPATIENT
Start: 2024-07-30 | End: 2024-08-03

## 2024-07-30 RX ORDER — HYDROMORPHONE HYDROCHLORIDE 1 MG/ML
0.5 INJECTION, SOLUTION INTRAMUSCULAR; INTRAVENOUS; SUBCUTANEOUS EVERY 30 MIN PRN
Status: DISCONTINUED | OUTPATIENT
Start: 2024-07-30 | End: 2024-07-30 | Stop reason: HOSPADM

## 2024-07-30 RX ORDER — QUETIAPINE FUMARATE 25 MG/1
12.5 TABLET, FILM COATED ORAL AT BEDTIME
Qty: 60 TABLET | Refills: 0 | Status: SHIPPED | OUTPATIENT
Start: 2024-07-30

## 2024-07-30 RX ORDER — ONDANSETRON 2 MG/ML
4 INJECTION INTRAMUSCULAR; INTRAVENOUS EVERY 30 MIN PRN
Status: DISCONTINUED | OUTPATIENT
Start: 2024-07-30 | End: 2024-07-30 | Stop reason: HOSPADM

## 2024-07-30 RX ADMIN — RIVAROXABAN 15 MG: 15 TABLET, FILM COATED ORAL at 06:15

## 2024-07-30 RX ADMIN — HYDROMORPHONE HYDROCHLORIDE 0.5 MG: 1 INJECTION, SOLUTION INTRAMUSCULAR; INTRAVENOUS; SUBCUTANEOUS at 04:49

## 2024-07-30 RX ADMIN — IOPAMIDOL 75 ML: 755 INJECTION, SOLUTION INTRAVENOUS at 05:27

## 2024-07-30 RX ADMIN — ONDANSETRON 4 MG: 2 INJECTION INTRAMUSCULAR; INTRAVENOUS at 04:49

## 2024-07-30 RX ADMIN — MIDAZOLAM 1 MG: 1 INJECTION INTRAMUSCULAR; INTRAVENOUS at 05:28

## 2024-07-30 RX ADMIN — SODIUM CHLORIDE 1000 ML: 9 INJECTION, SOLUTION INTRAVENOUS at 04:17

## 2024-07-30 RX ADMIN — KETOROLAC TROMETHAMINE 15 MG: 15 INJECTION, SOLUTION INTRAMUSCULAR; INTRAVENOUS at 04:17

## 2024-07-30 ASSESSMENT — ACTIVITIES OF DAILY LIVING (ADL)
ADLS_ACUITY_SCORE: 38
ADLS_ACUITY_SCORE: 45
ADLS_ACUITY_SCORE: 45

## 2024-07-30 ASSESSMENT — COLUMBIA-SUICIDE SEVERITY RATING SCALE - C-SSRS
1. IN THE PAST MONTH, HAVE YOU WISHED YOU WERE DEAD OR WISHED YOU COULD GO TO SLEEP AND NOT WAKE UP?: NO
6. HAVE YOU EVER DONE ANYTHING, STARTED TO DO ANYTHING, OR PREPARED TO DO ANYTHING TO END YOUR LIFE?: NO
2. HAVE YOU ACTUALLY HAD ANY THOUGHTS OF KILLING YOURSELF IN THE PAST MONTH?: NO

## 2024-07-30 NOTE — ED NOTES
AVS reviewed with pt and family. Education provided on worsening symptoms to watch out for, new medication, constipation management and close follow-up with PCP. All questions were answered. Vitally stable upon discharge.

## 2024-07-30 NOTE — LETTER
7/30/2024       RE: Zahra Fitzgerald  [address]  [place] [address]     Dear Colleague,    Thank you for referring your patient, Zahra Fitzgerald, to the [facility] [place] [facility]. Please see a copy of my visit note below.    [facility] Neurology Follow Up Visit    Zahra Fitzgerald MRN# [id number]   Age: 79 year old YOB: 1945     Brief history of symptoms: The patient was initially seen in neurologic consultation on 3/4/2024 for evaluation of cognitive deficits. Please see the comprehensive neurologic consultation notes from those dates in the Epic records for details.     Overall impression after our first visit and exam was that the patient had 6-7 years of progressive cognitive decline leading to a range of functional impairment and requiring help with most basic daily tasks (beyond hygiene and eating). Give prior imaging showing generalized atrophy, and symptoms, it was thought her condition was most in-line with Alzheimer's dementia.  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  She was to obtain a CPT, and serum studies, but I also indicated to her family that POA and POH should be enacted if they weren't already.  I did not feel she had capacity to make medical and financial decisions for herself.    Interval history:   - CPT evaluation ordered but not done  - serum studies done 3/4/2024 and normal (A1c, B12, TSH, SPEP, Immunofixation, B6, RPR)    Today, the patient is sleeping as she was in the ER last night for abdominal pain due to DVT b/l. An OT evaluation was to be done in-home, but couldn't be scheduled.  She is currently living at home and have cares with her , and two siblings.  Her  is 89 and has physical limitation.  She does still yell prior to sleep and is agitated before.  This leads to her not sitting on the toilet, and screams and  yells. She reports she is in pain to a greater extent during these times.      Physical Exam:   Sleeping at this time. Can be awoken but goes back to sleep.           Assessment and Plan:   Assessment:  Alzheimer's dementia, advanced    The patient has sundowning reported by her family with agitation and delays in sleep due to it. She recently had an EKG w/out pertinent findings of QT interval delay.  She may benefit from Seroquel at night, and we spoke today about risks (black box warning, risks of morbidity and mortality) and benefits.  Otherwise, to better determine her functional status objectively I sill think a CPT should be done.  Her family would benefit from a discussion with our social work nursing staff in terms of housing and community resources.     Plan:  -  referral (Son Khris: [phone])  - Seroquel 12.5 mg at bedtime   - OT for CPT    Follow up in Neurology clinic in 3 months, or earlier as needed should new concerns arise.    Total time today (35 min) in this patient encounter was spent on pre-charting, counseling and/or coordination of care.         Again, thank you for allowing me to participate in the care of your patient.      Sincerely,    Jefry Julian, DO

## 2024-07-30 NOTE — TELEPHONE ENCOUNTER
Patient was seen in Emergency Room yesterday and was diagnosed with Acute deep vein thrombosis (DVT) of femoral vein of both lower extremities. Rivaroxaban ANTICOAGULANT 15 & 20 MG TBPK Starter Therapy Pack ordered on discharge.     Pharmacy calling and states insurance will not cover Rivaroxaban ANTICOAGULANT 15 & 20 MG TBPK Starter Therapy Pack or any medication in this class without a prior authorization. (No Eloquis or Pradaxa.)    Pharmacist checked with Emergency Room provider and they are deferring management to PCP.     Pharmacist asks if there is there a different medication you would recommend or should we start a prior authorization with possible delay of treatment?    Routed to PCP for review and recommendation.

## 2024-07-30 NOTE — PROGRESS NOTES
[facility] Neurology Follow Up Visit    Zahra Fitzgerald MRN# [id number]   Age: 79 year old YOB: 1945     Brief history of symptoms: The patient was initially seen in neurologic consultation on 3/4/2024 for evaluation of cognitive deficits. Please see the comprehensive neurologic consultation notes from those dates in the Epic records for details.     Overall impression after our first visit and exam was that the patient had 6-7 years of progressive cognitive decline leading to a range of functional impairment and requiring help with most basic daily tasks (beyond hygiene and eating). Give prior imaging showing generalized atrophy, and symptoms, it was thought her condition was most in-line with Alzheimer's dementia.  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  She was to obtain a CPT, and serum studies, but I also indicated to her family that POA and POH should be enacted if they weren't already.  I did not feel she had capacity to make medical and financial decisions for herself.    Interval history:   - CPT evaluation ordered but not done  - serum studies done 3/4/2024 and normal (A1c, B12, TSH, SPEP, Immunofixation, B6, RPR)    Today, the patient is sleeping as she was in the ER last night for abdominal pain due to DVT b/l. An OT evaluation was to be done in-home, but couldn't be scheduled.  She is currently living at home and have cares with her , and two siblings.  Her  is 89 and has physical limitation.  She does still yell prior to sleep and is agitated before.  This leads to her not sitting on the toilet, and screams and yells. She reports she is in pain to a greater extent during these times.      Physical Exam:   Sleeping at this time. Can be awoken but goes back to sleep.           Assessment and Plan:   Assessment:  Alzheimer's dementia, advanced    The patient has sundowning reported by her family with agitation and delays in sleep due  to it. She recently had an EKG w/out pertinent findings of QT interval delay.  She may benefit from Seroquel at night, and we spoke today about risks (black box warning, risks of morbidity and mortality) and benefits.  Otherwise, to better determine her functional status objectively I sill think a CPT should be done.  Her family would benefit from a discussion with our social work nursing staff in terms of housing and community resources.     Plan:  -  referral (Son Khris: [phone])  - Seroquel 12.5 mg at bedtime   - OT for CPT    Follow up in Neurology clinic in 3 months, or earlier as needed should new concerns arise.    TERELL Julian D.O.   of Neurology    Total time today (35 min) in this patient encounter was spent on pre-charting, counseling and/or coordination of care.

## 2024-07-30 NOTE — ED NOTES
Pt came in via private car, accompanied by  and son. Pt is a poor historian. She has Alzheimer's per son's report. Alert to herself and place. She required an assist of 3 to transfer from wheelchair to bed. Endorses pain of her lower extremities and lower abdominal pain. Per , pt hasn't had a bowel movement since Thursday last week.

## 2024-07-30 NOTE — PROGRESS NOTES
Virtual Visit Details    Type of service:  Video Visit   Video Start Time:  1130  Video End Time:11:47 AM    Originating Location (pt. Location): Home    Distant Location (provider location):  On-site  Platform used for Video Visit: Gabby

## 2024-07-30 NOTE — TELEPHONE ENCOUNTER
Outgoing call to patient. Son Khris answered. Relayed PCP's detailed message.   Appointment made for 4 weeks with PCP. No further questions at this time.    Prior Auth - medication, submitted high priority.

## 2024-07-30 NOTE — TELEPHONE ENCOUNTER
"  Patient spouse calling, states received a message about \"medication\".   Please see message in chart.    Previous RN spoke with son.    Prior Auth sent and pending.    Please review and send RX to Pharmacy if appropriate.  Pharmacy: [id number] [place], [place] - [address]         Blas Fitzgerald (Spouse)  [phone] (Home Phone)      "

## 2024-07-30 NOTE — ED TRIAGE NOTES
Pt presents to ED with c/o lower abdominal pain.  This is an ongoing problem, but usually does not wake the pt from sleep.  Pt seen in ED recently.  Pt given zofran around 1330 yesterday.  Son states she may be constipated. Pt has hx of alzheimer's.     Triage Assessment (Adult)       Row Name 07/30/24 0344          Triage Assessment    Airway WDL WDL        Respiratory WDL    Respiratory WDL WDL        Skin Circulation/Temperature WDL    Skin Circulation/Temperature WDL WDL        Cardiac WDL    Cardiac WDL WDL        Peripheral/Neurovascular WDL    Peripheral Neurovascular WDL WDL        Cognitive/Neuro/Behavioral WDL    Cognitive/Neuro/Behavioral WDL X;orientation     Level of Consciousness confused     Arousal Level opens eyes spontaneously     Orientation disoriented to;time;situation        Pupils (CN II)    Pupil PERRLA yes        Jackie Coma Scale    Best Eye Response 4-->(E4) spontaneous     Best Motor Response 6-->(M6) obeys commands     Best Verbal Response 4-->(V4) confused     [place] Coma Scale Score 14

## 2024-07-30 NOTE — TELEPHONE ENCOUNTER
Central Prior Authorization Team - Phone: [phone]     PA Initiation    Medication: XARELTO STARTER PACK 15 & 20 MG PO TBPK  Insurance Company: [employer] Non-Specialty PA's - Phone [phone] Fax [phone]  Pharmacy Filling the Rx: [id number] [place], [place] - [address]  Filling Pharmacy Phone: [phone]  Filling Pharmacy Fax:    Start Date: 7/30/2024

## 2024-07-30 NOTE — NURSING NOTE
Current patient location: [address] [place] [id number]    Is the patient currently in the state of [place]? YES    Visit mode:VIDEO    If the visit is dropped, the patient can be reconnected by: TELEPHONE VISIT: Phone number:   Telephone Information:   Mobile [phone]       Will anyone else be joining the visit? NO  (If patient encounters technical issues they should call [phone])    How would you like to obtain your AVS? MyChart    Are changes needed to the allergy or medication list? Pt stated no med changes    Are refills needed on medications prescribed by this physician? NO    Reason for visit: Video Visit (3 month follow-up )    Nayeli DAMIAN

## 2024-07-30 NOTE — ED PROVIDER NOTES
EMERGENCY DEPARTMENT ENCOUNTER      NAME: Zahra Fitzgerald  AGE: 79 year old female  YOB: 1945  MRN: [id number]  EVALUATION DATE & TIME: 7/30/2024  3:53 AM    PCP: Kushal Motley    ED PROVIDER: Justin Clement M.D.      Chief Complaint   Patient presents with    Abdominal Pain         FINAL IMPRESSION:  1. Acute deep vein thrombosis (DVT) of femoral vein of both lower extremities (H)          ED COURSE & MEDICAL DECISION MAKING:    Pertinent Labs & Imaging studies reviewed. (See chart for details)  79 year old female presents to the Emergency Department for evaluation of pain.  Her pain seems to be more on her right side specially down into her right leg.  Somewhat difficult history given her dementia.  Has chronic right abdominal pain though this seems different.  Did do a CT scan.  No obvious cause.  Does have significant swelling right greater than left of her legs.  Given this did do an ultrasound.  Does have a DVT of her right leg.  Labs otherwise unremarkable.  Given long discussion with family including son and .  Patient is at risk for PE.  She is essentially nonmobile at home.  No history of falls.  No history of GI bleeding.  Will start her on rivaroxaban here.  Will have her follow-up with her primary later this week to further monitor.  Vital signs are normal.  No hypoxia.  No chest pain.  I do not think there is PE.  Will discharge home.    4:02 AM I met with the patient to gather history and to perform my initial exam. I discussed the plan for care while in the Emergency Department.       At the conclusion of the encounter I discussed the results of all of the tests and the disposition. The questions were answered. The patient or family acknowledged understanding and was agreeable with the care plan.     Medical Decision Making  Obtained supplemental history:Supplemental history obtained?: Documented in chart  Reviewed external records: External records reviewed?: Documented  in chart  Care impacted by chronic illness:Hyperlipidemia and Hypertension  Care significantly affected by social determinants of health:N/A  Did you consider but not order tests?: Work up considered but not performed and documented in chart, if applicable  Did you interpret images independently?: Independent interpretation of ECG and images noted in documentation, when applicable.  Consultation discussion with other provider:Did you involve another provider (consultant, , pharmacy, etc.)?: No  Discharge. I prescribed additional prescription strength medication(s) as charted. I considered admission, but discharged the patient after share decision making conversation.         MEDICATIONS GIVEN IN THE EMERGENCY:  Medications   ondansetron (ZOFRAN) injection 4 mg (4 mg Intravenous $Given 7/30/24 1589)   HYDROmorphone (PF) (DILAUDID) injection 0.5 mg (0.5 mg Intravenous Not Given 7/30/24 0533)   sodium chloride 0.9% BOLUS 1,000 mL (0 mLs Intravenous Stopped 7/30/24 0531)   ketorolac (TORADOL) injection 15 mg (15 mg Intravenous $Given 7/30/24 0417)   midazolam (VERSED) injection 1 mg (1 mg Intravenous $Given 7/30/24 0528)   iopamidol (ISOVUE-370) solution 75 mL (75 mLs Intravenous $Given 7/30/24 0527)   rivaroxaban ANTICOAGULANT (XARELTO) tablet 15 mg (15 mg Oral $Given 7/30/24 0615)       NEW PRESCRIPTIONS STARTED AT TODAY'S ER VISIT  New Prescriptions    OXYCODONE (ROXICODONE) 5 MG TABLET    Take 1 tablet (5 mg) by mouth every 4 hours as needed If pain is not improved with acetaminophen and ibuprofen.    RIVAROXABAN ANTICOAGULANT 15 & 20 MG TBPK STARTER THERAPY PACK    Take 15 mg by mouth 2 times daily (with meals) for 21 days, THEN 20 mg daily with food for 9 days.          =================================================================    HPI    Patient information was obtained from: patient, patient's , patient's son    Use of : N/A         Zahra Fitzgerald is a 79 year old female with a  pertinent history of Alzheimer's disease, hyperlipidemia, and hypertension who presents to this ED for evaluation of right-sided abdominal pain.     Per patient's son, patient has had right-sided abdominal pain for a long time but this is the first time she has woken up from the pain in the middle of the night. Usually, the right-sided abdominal pain happens in the evenings. Patient was given zofran that helped with her pain at 1300. Son mentions that patient has Alzheimer's. Patient does not have her appendix. Son is concerned that this is due to her constipation. Denies urinary symptoms, fever, cough, shortness of breath.     Per patient's , patient has been rubbing down her right leg indicating pain in the area along with holding the right side of her abdomen. Patient vomited 7/27/2024 at night. Last bowel movement was on 7/24/2024 or 7/25/2024. Patient usually wears a compression sock on her right leg.     Per chart review, patient was seen here on 7/27/2024 for evaluation of nausea and vomiting. There was no evidence of significant illness on exam nor laboratory evaluation. Patient was prescribed zofran 4mg and discharged to home.         PAST MEDICAL HISTORY:  History reviewed. No pertinent past medical history.    PAST SURGICAL HISTORY:  Past Surgical History:   Procedure Laterality Date    [facility] APPENDECTOMY      Description: Appendectomy;  Recorded: 03/12/2009;           CURRENT MEDICATIONS:    Current Facility-Administered Medications   Medication Dose Route Frequency Provider Last Rate Last Admin    denosumab (PROLIA) injection 60 mg  60 mg Subcutaneous Q6 Months Kushal Motley MD   60 mg at 07/27/23 1122    HYDROmorphone (PF) (DILAUDID) injection 0.5 mg  0.5 mg Intravenous Q30 Min PRN Justin Clement MD   0.5 mg at 07/30/24 0449    ondansetron (ZOFRAN) injection 4 mg  4 mg Intravenous Q30 Min PRN Justin Clement MD   4 mg at 07/30/24 0449     Current Outpatient Medications   Medication Sig  "Dispense Refill    oxyCODONE (ROXICODONE) 5 MG tablet Take 1 tablet (5 mg) by mouth every 4 hours as needed If pain is not improved with acetaminophen and ibuprofen. 12 tablet 0    Rivaroxaban ANTICOAGULANT 15 & 20 MG TBPK Starter Therapy Pack Take 15 mg by mouth 2 times daily (with meals) for 21 days, THEN 20 mg daily with food for 9 days. 51 each 0    aspirin 81 mg chewable tablet Take 81 mg by mouth every evening      atenolol (TENORMIN) 25 MG tablet TAKE ONE TABLET BY MOUTH ONE TIME DAILY 90 tablet 3    black cohosh 540 mg cap Take 1 capsule by mouth every evening (Patient not taking: Reported on 4/2/2024)      calcium carbonate 600 mg-vitamin D 400 units (CALTRATE) 600-400 MG-UNIT per tablet Take 1 tablet by mouth daily (Patient not taking: Reported on 4/2/2024)      FOLIC ACID/MULTIVITS-MIN/LUT (CENTRUM SILVER ORAL) Take 1 tablet by mouth daily (Patient not taking: Reported on 4/2/2024)      ondansetron (ZOFRAN ODT) 4 MG ODT tab Take 1 tablet (4 mg) by mouth every 8 hours as needed 10 tablet 0    ondansetron (ZOFRAN ODT) 4 MG ODT tab Take 1 tablet (4 mg) by mouth every 8 hours as needed for nausea or vomiting 20 tablet 1    simvastatin (ZOCOR) 10 MG tablet TAKE ONE TABLET BY MOUTH AT BEDTIME 90 tablet 3    tiZANidine (ZANAFLEX) 4 MG tablet Take 1 tablet (4 mg) by mouth 3 times daily as needed for muscle spasms 10 tablet 0         ALLERGIES:  No Known Allergies    FAMILY HISTORY:  Family History   Problem Relation Age of Onset    Breast Cancer Mother         Unsure of age    Breast Cancer Maternal Aunt 65.00    Breast Cancer Maternal Aunt 65.00    Breast Cancer Sister 42.00       SOCIAL HISTORY:   Social History     Socioeconomic History    Marital status:    Tobacco Use    Smoking status: Never     Passive exposure: Never    Smokeless tobacco: Never   Vaping Use    Vaping status: Never Used       VITALS:  BP 94/50   Pulse 79   Temp 98.1  F (36.7  C) (Oral)   Resp 16   Ht 1.651 m (5' 5\")   Wt 70.3 " kg (155 lb)   LMP  (LMP Unknown)   SpO2 97%   BMI 25.79 kg/m      PHYSICAL EXAM    Physical Exam  Vitals and nursing note reviewed.   Constitutional:       General: She is not in acute distress.     Appearance: She is not diaphoretic.   HENT:      Head: Atraumatic.      Mouth/Throat:      Pharynx: No oropharyngeal exudate.   Eyes:      General: No scleral icterus.     Pupils: Pupils are equal, round, and reactive to light.   Cardiovascular:      Rate and Rhythm: Normal rate and regular rhythm.      Heart sounds: Normal heart sounds.   Pulmonary:      Effort: No respiratory distress.      Breath sounds: Normal breath sounds.   Abdominal:      Palpations: Abdomen is soft.      Tenderness: There is abdominal tenderness in the right lower quadrant. There is no guarding or rebound. Negative signs include Saini's sign.   Musculoskeletal:         General: No tenderness.      Comments: 2+ edema right lower extremity from hip down to ankle.  1+ edema on the left lower extremity.  Pulses are intact bilaterally.  No erythema.  No warmth.   Skin:     General: Skin is warm.      Findings: No rash.   Neurological:      General: No focal deficit present.      Mental Status: She is alert.           LAB:  All pertinent labs reviewed and interpreted.  Labs Ordered and Resulted from Time of ED Arrival to Time of ED Departure   COMPREHENSIVE METABOLIC PANEL - Abnormal       Result Value    Sodium 142      Potassium 3.9      Carbon Dioxide (CO2) 24      Anion Gap 13      Urea Nitrogen 21.3      Creatinine 1.16 (*)     GFR Estimate 48 (*)     Calcium 10.1      Chloride 105      Glucose 105 (*)     Alkaline Phosphatase 79      AST 27      ALT <5      Protein Total 7.0      Albumin 3.9      Bilirubin Total 0.3     LIPASE - Normal    Lipase 51     LACTIC ACID WHOLE BLOOD WITH 1X REPEAT IN 2 HR WHEN >2 - Normal    Lactic Acid, Initial 1.9     CBC WITH PLATELETS AND DIFFERENTIAL    WBC Count 7.9      RBC Count 3.85      Hemoglobin 12.0       Hematocrit 35.9      MCV 93      MCH 31.2      MCHC 33.4      RDW 12.7      Platelet Count 209      % Neutrophils 60      % Lymphocytes 28      % Monocytes 10      % Eosinophils 2      % Basophils 1      % Immature Granulocytes 0      NRBCs per 100 WBC 0      Absolute Neutrophils 4.7      Absolute Lymphocytes 2.2      Absolute Monocytes 0.8      Absolute Eosinophils 0.2      Absolute Basophils 0.1      Absolute Immature Granulocytes 0.0      Absolute NRBCs 0.0         RADIOLOGY:  Reviewed all pertinent imaging. Please see official radiology report.  CT Abdomen Pelvis w Contrast   Final Result   IMPRESSION:    No acute process identified in the abdomen or pelvis.      US Lower Extremity Venous Duplex Right   Final Result   IMPRESSION:   1.  There is occlusive deep venous thrombosis of the right femoral and popliteal veins.   2.  There is occlusive deep venous thrombosis of the left common femoral vein.      Findings were communicated to the Dr. Clement at 4:49 AM on 7/30/2024.                          I, Abbie Duff, am serving as a scribe to document services personally performed by Dr. Justin Clement, based on my observation and the provider's statements to me. I, Justin Clement MD attest that Abbie Duff is acting in a scribe capacity, has observed my performance of the services and has documented them in accordance with my direction.    Justin Clement M.D.  Emergency Medicine  [facility] EMERGENCY ROOM  [address] [id number] [phone]  Dept: [phone]      Justin Clement MD  07/30/24 0627       Justin Clement MD  09/08/24 7368

## 2024-07-30 NOTE — TELEPHONE ENCOUNTER
General Call      Reason for Call:  Rivaroxaban ANTICOAGULANT 15 & 20 MG TBPK Starter Therapy Pack     What are your questions or concerns:  Pt's son calling because pt was in ED yesterday with DVT. Pt was prescribed this med, but per Khris, they cannot get it until it gets pushed through Medicare. Please advise    Date of last appointment with provider: 02/22/24 with Dr. Ferrari    Lui, son: [phone] Consent to Communicate on file. Ok to leave detailed message

## 2024-07-30 NOTE — TELEPHONE ENCOUNTER
She has the DVT only in the right leg. Radiologist did the addendum of the US result. Please do the PA for Xarelto as prescribed in the ED.   Call PA department to do it today.  Call the family and give the update about the US and the medication.  To see me in 4 weeks.

## 2024-07-31 ENCOUNTER — TELEPHONE (OUTPATIENT)
Dept: FAMILY MEDICINE | Facility: OTHER | Age: 79
End: 2024-07-31

## 2024-07-31 NOTE — TELEPHONE ENCOUNTER
Please call patient's family (has Alzheimer's)     ED follow up should be face to face with PCP. Not appropriate for virtual. Needs to have leg reassessed. Also ED stated later this week after starting medication, doesn't seem she has been able to do that yet from the prior auth.     Harley Pickens PA-C  [facility]

## 2024-07-31 NOTE — TELEPHONE ENCOUNTER
Patient not awake for 12 hours. Eats meals at 12pm and 5pm please advise if she can take one dose when she wakes up around 12pm and at bedtime with a snack around 10pm or what your recommendation would be.

## 2024-07-31 NOTE — TELEPHONE ENCOUNTER
RN called son and relayed message below. Appointment canceled. RN will send a message to patient's PCP and primary clinic to see if they would like to follow with with patient sooner than 8/27 in addition to that appointment?    Future Appointments 7/31/2024 - 1/27/2025        Date Visit Type Length Department Provider     8/27/2024  2:20 PM ED/HOSP FOLLOW UP 40 min [facility] INTERNAL MEDICINE Kushal Motley MD    Location Instructions:     [facility] is located at [address] in [place], across from [facility]. This is just south of the [address] exit off of [place] [address]. From [facility], turn south on [address], then turn into the main entrance of [facility]. Take the first left to access the clinic s parking lot.                   Would you like to see patient prior to 8/27?    JUSTICE Borja, RN

## 2024-07-31 NOTE — TELEPHONE ENCOUNTER
They have not started the blood yet as it was not approved from the insurance company yet and the pharmacy was out of stock. However, it was reported that they can go pick it up today as it was now approved by insurance and the medication is now in stock. They are going to talk about transportation and times that work and call back. When they call back go over administration of the blood thinner, patient is not awake 12 hours a day for 2 doses.     KEON Toledo

## 2024-07-31 NOTE — TELEPHONE ENCOUNTER
calling back. States he wants to know how much food she needs to take her Rivaroxaban with. Advised Blas that medication should be taken just after a meal or snack. Educated  that pt is prescribed a starter pack in which pt will take a dose twice daily for 3 weeks and then once daily after that. No further questions at this time.

## 2024-07-31 NOTE — TELEPHONE ENCOUNTER
Central Prior Authorization Team - Phone: [phone]     Prior Authorization Approval    Medication: XARELTO STARTER PACK 15 & 20 MG PO TBPK  Authorization Effective Date: 6/30/2024  Authorization Expiration Date: 7/30/2025  Approved Dose/Quantity: 51  Reference #:     Insurance Company: [employer] Non-Specialty PA's - Phone [phone] Fax [phone]  Expected CoPay: $    CoPay Card Available:      Financial Assistance Needed:   Which Pharmacy is filling the prescription: [id number] - [place], [place] - [address]  Pharmacy Notified: YES  Patient Notified: YES pharmacy will notify when ready

## 2024-07-31 NOTE — TELEPHONE ENCOUNTER
Call to Blas and relayed message. He is wondering how long patient will be on medication for. Reassured him that at the August 8th appointment with Dr. Motley she will review her chart and give further instructions. Reviewed dosing instructions. Agreeable to plan of care.

## 2024-07-31 NOTE — TELEPHONE ENCOUNTER
returned call, stating he was given the ok to schedule an appointment for August 8th at 1pm for ED follow up.

## 2024-07-31 NOTE — TELEPHONE ENCOUNTER
Call the pt's son. Check if she started the blood thinner Rivaroxaban. I can see her next week. You can double book with one of my 40 min appointments on Wed AM or on Thursday.

## 2024-08-01 ENCOUNTER — PATIENT OUTREACH (OUTPATIENT)
Dept: CARE COORDINATION | Facility: CLINIC | Age: 79
End: 2024-08-01
Payer: MEDICARE

## 2024-08-01 NOTE — PROGRESS NOTES
Connected Care Resource [place]  Community Health Worker Initial Outreach    CHW Initial Information Gathering:  Referral Source: ED Follow-Up  CHW Additional Questions  If ED/Hospital discharge, follow-up appointment scheduled as recommended?: Yes  Prakash active?: No    Patient accepts CC: No, patient's son declined any need for care coordination at this time. Pt's son stated he thought they were still enrolled in care coordination - CHW informed him they were dis-enrolled as the care coordinator was unable to reach them but can re-enroll at any time. Pt's son declines any needs at this time.     Unable to send care coordination introduction letter since Prakash is not active.  Clinic Care Coordination services remain available via referral if needed.      Ruthie Lainez  Community Health Worker  Connected Care Resource [place], [facility]    *Connected Care Resource Team does NOT follow patient ongoing. Referrals are identified based on internal discharge reports and the outreach is to ensure patient has an understanding of their discharge instructions.

## 2024-08-02 ENCOUNTER — TELEPHONE (OUTPATIENT)
Dept: INTERNAL MEDICINE | Facility: CLINIC | Age: 79
End: 2024-08-02
Payer: MEDICARE

## 2024-08-02 NOTE — TELEPHONE ENCOUNTER
Incoming call from spouse, wanting PCP to be aware that pt is refusing to take her medication Rivaroxaban.     Rivaroxaban ANTICOAGULANT 15 & 20 MG TBPK Starter Therapy Pack   Sig - Route: Take 15 mg by mouth 2 times daily (with meals) for 21 days, THEN 20 mg daily with food for 9 days.     She will not get let him touch her and she will swears if he does it.   He states he have to hide it and sometimes pt doesn't take her medications at all.     Advise to give her space then retry again later.   Spouse states he already tried that.     Wondering if its okay to give it at 11:30 since she normally take it at 10:30.   Writer advise that it is okay. And the important of taking it.   Pt have a follow-up appointment on 8/8 with pcp.     No further questions at this time.   Horace CORNEJO RN

## 2024-08-05 ENCOUNTER — DOCUMENTATION ONLY (OUTPATIENT)
Dept: OTHER | Facility: CLINIC | Age: 79
End: 2024-08-05
Payer: MEDICARE

## 2024-08-06 DIAGNOSIS — M81.0 AGE-RELATED OSTEOPOROSIS WITHOUT CURRENT PATHOLOGICAL FRACTURE: Primary | ICD-10-CM

## 2024-08-06 DIAGNOSIS — Z92.29 PERSONAL HISTORY OF OTHER DRUG THERAPY: ICD-10-CM

## 2024-08-08 ENCOUNTER — OFFICE VISIT (OUTPATIENT)
Dept: INTERNAL MEDICINE | Facility: CLINIC | Age: 79
End: 2024-08-08
Payer: MEDICARE

## 2024-08-08 ENCOUNTER — PATIENT OUTREACH (OUTPATIENT)
Dept: CARE COORDINATION | Facility: CLINIC | Age: 79
End: 2024-08-08

## 2024-08-08 VITALS — DIASTOLIC BLOOD PRESSURE: 70 MMHG | SYSTOLIC BLOOD PRESSURE: 114 MMHG | TEMPERATURE: 96.6 F | RESPIRATION RATE: 16 BRPM

## 2024-08-08 DIAGNOSIS — E78.2 MIXED HYPERLIPIDEMIA: ICD-10-CM

## 2024-08-08 DIAGNOSIS — I82.4Y1 ACUTE DEEP VEIN THROMBOSIS (DVT) OF PROXIMAL VEIN OF RIGHT LOWER EXTREMITY (H): ICD-10-CM

## 2024-08-08 DIAGNOSIS — F02.C3 SEVERE LATE ONSET ALZHEIMER'S DEMENTIA WITH MOOD DISTURBANCE (H): Primary | ICD-10-CM

## 2024-08-08 DIAGNOSIS — I10 ESSENTIAL HYPERTENSION: ICD-10-CM

## 2024-08-08 DIAGNOSIS — G30.1 SEVERE LATE ONSET ALZHEIMER'S DEMENTIA WITH MOOD DISTURBANCE (H): Primary | ICD-10-CM

## 2024-08-08 PROBLEM — K57.32 DIVERTICULITIS OF COLON: Status: RESOLVED | Noted: 2023-10-20 | Resolved: 2024-08-08

## 2024-08-08 PROCEDURE — 99215 OFFICE O/P EST HI 40 MIN: CPT | Performed by: INTERNAL MEDICINE

## 2024-08-08 PROCEDURE — G2211 COMPLEX E/M VISIT ADD ON: HCPCS | Performed by: INTERNAL MEDICINE

## 2024-08-08 NOTE — PATIENT INSTRUCTIONS
Stop aspirin while she is on rivaroxaban.  You can stop simvastatin.    Home care nurse will call to schedule the first visit.    Continue rivaroxaban for 3 months.      US right leg in 3 months [phone].    Video visit with me after US is done.     SSKI Counseling:  I discussed with the patient the risks of SSKI including but not limited to thyroid abnormalities, metallic taste, GI upset, fever, headache, acne, arthralgias, paraesthesias, lymphadenopathy, easy bleeding, arrhythmias, and allergic reaction.

## 2024-08-08 NOTE — PROGRESS NOTES
Assessment & Plan     Patient is here today with her  and her son. She is pleasant and quiet, but was not able to follow the conversation and not oriented x 3.      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  He discussed with the family that POA and POH should be enacted if they weren't already. He did not feel she had capacity to make medical and financial decisions for herself.   CPT evaluation ordered but not done  serum studies done 3/4/2024 and normal (A1c, B12, TSH, SPEP, Immunofixation, B6, RPR)  She is currently living at home and have cares with her , and two siblings. Her  is 89 and has physical limitation. She does still yell prior to sleep and is agitated before. This leads to her not sitting on the toilet, and screams and yells.   Seroquel 12.5 mg was prescribed few weeks ago for agitation. They are connected with the  from Neurology clinic.   I placed the order for skilled nurse and home aid since patient not able to stand up , walk or transfer independently.   - Home Care Referral    Acute deep vein thrombosis (DVT) of proximal vein of right lower extremity (H)  Right leg acute DVT was diagnosed on 7/30/24.   Patient is at risk for PE. She is essentially nonmobile at home. No history of falls. No history of GI bleeding.   We started Xarelto and will continue for 3 months.  We will repeat US in 3 months.    IMPRESSION:  1.  There is occlusive deep venous thrombosis of the right femoral and popliteal veins.    On the exam today, both legs are with edema, R > L. We discussed compression stocking to be placed daily.  She does not have dyspnea or chest pain.    I reviewed labs ( stable) and CT abdomen/pelvis ( no acute findings)  from the ED visit.    - Home Care Referral  - rivaroxaban ANTICOAGULANT (XARELTO) 20 MG TABS tablet; Take 1 tablet (20 mg) by mouth daily (with dinner)  - US Lower Extremity Venous Duplex Right;  "Future    Mixed hyperlipidemia  On simvastatin for many years. Her  is struggling with her medications. She is refusing them often and does not want to swallow them. At this point we decided to stop the statin and aspirin.    Hypertension  Stable on atenolol.        Total time spent on the date of this encounter doing: chart review, review of test results, patient visit, physical exam, education, counseling, developing plan of care, and documenting = 40    minutes.     The longitudinal plan of care for the diagnosis(es)/condition(s) as documented were addressed during this visit. Due to the added complexity in care, I will continue to support Zahra in the subsequent management and with ongoing continuity of care.  BMI  Estimated body mass index is 25.79 kg/m  as calculated from the following:    Height as of 7/30/24: 1.651 m (5' 5\").    Weight as of 7/30/24: 70.3 kg (155 lb).             Shantelle Sweeney is a 79 year old, presenting for the following health issues:  ER F/U (ED/F/U 7/30/24 DVT WW)      8/8/2024     1:12 PM   Additional Questions   Roomed by Jennifer SMITH                   Objective    /70   Temp (!) 96.6  F (35.9  C)   Resp 16   LMP  (LMP Unknown)   There is no height or weight on file to calculate BMI.  Physical Exam               Signed Electronically by: Kushal Motley MD    "

## 2024-08-08 NOTE — PROGRESS NOTES
Social Work - Intervention  [facility]  Data/Intervention:    Patient Name: Zahra Fitzgerald Goes By: Zahra    /Age: 1945 (79 year old)     Visit Type: telephone and email  Referral Source: Neurology  Reason for Referral: Community resources     Collaborated With:    -Patient's son, Khris, [phone], email: [email or web address]     Psychosocial Information/Concerns:  , covering for [employer], contacted Patient's son, Khris, to discuss community resources for Patient. Patient has been diagnosed with severe Alzheimer's Dementia.     Per Khris, the family is looking for a PCA type service to come to the home for a couple hours daily to help with getting dressed, bathing, etc. Khris reports Patient's spouse is 90 years old and it's getting difficult to help her with those tasks. Khris reported that Patient does have a Long Term Care insurance plan.    Intervention/Education/Resources Provided:   encouraged [place] to contact Patient's LTC insurance to see the specifics of Patient's plan and whether they have specific home care agencies they prefer.  also emailed Khris a list of PCA-type agencies to reference if needed. Khris had no other questions at this time.      Assessment/Plan:  Khris/Patient's family to look into LTC insurance policy and reference the list of PCA-type agencies at their leisure. [employer] will be available in the future, if needs arise.     Chary Stout [facility]  Clinical , Outpatient Specialty Clinics  [facility]  Direct Phone: [phone]

## 2024-08-10 ENCOUNTER — MEDICAL CORRESPONDENCE (OUTPATIENT)
Dept: HEALTH INFORMATION MANAGEMENT | Facility: CLINIC | Age: 79
End: 2024-08-10

## 2024-08-13 ENCOUNTER — DOCUMENTATION ONLY (OUTPATIENT)
Dept: ANTICOAGULATION | Facility: CLINIC | Age: 79
End: 2024-08-13
Payer: MEDICARE

## 2024-08-13 NOTE — PROGRESS NOTES
Anticoagulant Therapeutic Duplication    Duplicate orders identified: same medication but different dose, form, frequency or route    Duplicate orders appropriate for Rxs on file for rivaroxaban starter therapy pack and rivaroxaban maintenance dose to follow     Active anticoagulant: rivaroxaban (Xarelto)    Plan made per [facility] anticoagulation protocol.    Juan C Perez RN  8/13/2024

## 2024-08-21 ENCOUNTER — TELEPHONE (OUTPATIENT)
Dept: INTERNAL MEDICINE | Facility: CLINIC | Age: 79
End: 2024-08-21
Payer: MEDICARE

## 2024-08-21 NOTE — TELEPHONE ENCOUNTER
Blas calling to clarify Xarelto dosing and schedule. Patient has completed 21 days and was instructed to check in with provider. Per instructions, after 15 mg BID x 21 days patient to take 20 mg daily x 9 days.     Another prescription for 3 months of Xarelto 20 mg daily has been sent to pharmacy. Blas will call to coordinate pickup.     Reviewed medication instructions as well as follow-up ultrasound in 3 months. Blas stated understanding. No further questions.

## 2024-08-22 ENCOUNTER — TELEPHONE (OUTPATIENT)
Dept: INTERNAL MEDICINE | Facility: CLINIC | Age: 79
End: 2024-08-22
Payer: MEDICARE

## 2024-08-22 NOTE — TELEPHONE ENCOUNTER
Called and spoke with Blas. He will call and schedule for end of October, gave him the number to call.

## 2024-08-22 NOTE — TELEPHONE ENCOUNTER
General Call    Contacts       Contact Date/Time Type Contact Phone/Fax    08/22/2024 02:50 PM CDT Phone (Incoming) Blas Jjrefugio (Emergency Contact) [phone] (H)     [phone]          Reason for Call:  ultrasound    What are your questions or concerns:   calling, asking for clarification when pt is to have follow up ultrasound for DVT.  thought PCP said to have done in 3 months. [facility] scheduling has pt scheduled for 8/27/24.  Please advise.    Per OV note 8/8/24:       Date of last appointment with provider: 8/8/24    Okay to leave a detailed message?: Yes at Home number on file [phone] (home)  or [phone]

## 2024-09-06 DIAGNOSIS — Z53.9 DIAGNOSIS NOT YET DEFINED: Primary | ICD-10-CM

## 2024-09-06 PROCEDURE — G0180 MD CERTIFICATION HHA PATIENT: HCPCS | Performed by: INTERNAL MEDICINE

## 2024-09-08 ENCOUNTER — HOSPITAL ENCOUNTER (EMERGENCY)
Facility: CLINIC | Age: 79
Discharge: HOME | End: 2024-09-09
Attending: EMERGENCY MEDICINE | Admitting: EMERGENCY MEDICINE
Payer: MEDICARE

## 2024-09-08 ENCOUNTER — NURSE TRIAGE (OUTPATIENT)
Dept: NURSING | Facility: CLINIC | Age: 79
End: 2024-09-08
Payer: MEDICARE

## 2024-09-08 DIAGNOSIS — N30.00 ACUTE CYSTITIS WITHOUT HEMATURIA: ICD-10-CM

## 2024-09-08 DIAGNOSIS — R10.31 RIGHT LOWER QUADRANT ABDOMINAL PAIN: ICD-10-CM

## 2024-09-08 PROCEDURE — 99284 EMERGENCY DEPT VISIT MOD MDM: CPT | Mod: 25

## 2024-09-08 RX ORDER — ONDANSETRON 2 MG/ML
4 INJECTION INTRAMUSCULAR; INTRAVENOUS EVERY 30 MIN PRN
Status: DISCONTINUED | OUTPATIENT
Start: 2024-09-08 | End: 2024-09-09 | Stop reason: HOSPADM

## 2024-09-09 VITALS
OXYGEN SATURATION: 95 % | RESPIRATION RATE: 20 BRPM | HEART RATE: 82 BPM | SYSTOLIC BLOOD PRESSURE: 119 MMHG | BODY MASS INDEX: 30.27 KG/M2 | DIASTOLIC BLOOD PRESSURE: 64 MMHG | HEIGHT: 60 IN | TEMPERATURE: 97 F

## 2024-09-09 LAB
ALBUMIN UR-MCNC: 10 MG/DL
ANION GAP SERPL CALCULATED.3IONS-SCNC: 11 MMOL/L (ref 7–15)
APPEARANCE UR: ABNORMAL
BACTERIA #/AREA URNS HPF: ABNORMAL /HPF
BASOPHILS # BLD AUTO: 0 10E3/UL (ref 0–0.2)
BASOPHILS NFR BLD AUTO: 0 %
BILIRUB UR QL STRIP: NEGATIVE
BUN SERPL-MCNC: 19.3 MG/DL (ref 8–23)
CALCIUM SERPL-MCNC: 10.4 MG/DL (ref 8.8–10.4)
CHLORIDE SERPL-SCNC: 104 MMOL/L (ref 98–107)
COLOR UR AUTO: YELLOW
CREAT SERPL-MCNC: 1.09 MG/DL (ref 0.51–0.95)
EGFRCR SERPLBLD CKD-EPI 2021: 51 ML/MIN/1.73M2
EOSINOPHIL # BLD AUTO: 0 10E3/UL (ref 0–0.7)
EOSINOPHIL NFR BLD AUTO: 0 %
ERYTHROCYTE [DISTWIDTH] IN BLOOD BY AUTOMATED COUNT: 14.6 % (ref 10–15)
GLUCOSE SERPL-MCNC: 154 MG/DL (ref 70–99)
GLUCOSE UR STRIP-MCNC: NEGATIVE MG/DL
HCO3 SERPL-SCNC: 25 MMOL/L (ref 22–29)
HCT VFR BLD AUTO: 40 % (ref 35–47)
HGB BLD-MCNC: 13.2 G/DL (ref 11.7–15.7)
HGB UR QL STRIP: ABNORMAL
HOLD SPECIMEN: NORMAL
HYALINE CASTS: 3 /LPF
IMM GRANULOCYTES # BLD: 0 10E3/UL
IMM GRANULOCYTES NFR BLD: 0 %
KETONES UR STRIP-MCNC: NEGATIVE MG/DL
LEUKOCYTE ESTERASE UR QL STRIP: NEGATIVE
LYMPHOCYTES # BLD AUTO: 0.6 10E3/UL (ref 0.8–5.3)
LYMPHOCYTES NFR BLD AUTO: 9 %
MCH RBC QN AUTO: 31.1 PG (ref 26.5–33)
MCHC RBC AUTO-ENTMCNC: 33 G/DL (ref 31.5–36.5)
MCV RBC AUTO: 94 FL (ref 78–100)
MONOCYTES # BLD AUTO: 0.2 10E3/UL (ref 0–1.3)
MONOCYTES NFR BLD AUTO: 3 %
MUCOUS THREADS #/AREA URNS LPF: PRESENT /LPF
NEUTROPHILS # BLD AUTO: 5.4 10E3/UL (ref 1.6–8.3)
NEUTROPHILS NFR BLD AUTO: 87 %
NITRATE UR QL: POSITIVE
NRBC # BLD AUTO: 0 10E3/UL
NRBC BLD AUTO-RTO: 0 /100
PH UR STRIP: 5.5 [PH] (ref 5–7)
PLATELET # BLD AUTO: 238 10E3/UL (ref 150–450)
POTASSIUM SERPL-SCNC: 4.6 MMOL/L (ref 3.4–5.3)
RBC # BLD AUTO: 4.25 10E6/UL (ref 3.8–5.2)
RBC URINE: 1 /HPF
SODIUM SERPL-SCNC: 140 MMOL/L (ref 135–145)
SP GR UR STRIP: 1.02 (ref 1–1.03)
SQUAMOUS EPITHELIAL: <1 /HPF
UROBILINOGEN UR STRIP-MCNC: <2 MG/DL
WBC # BLD AUTO: 6.2 10E3/UL (ref 4–11)
WBC URINE: 2 /HPF

## 2024-09-09 PROCEDURE — 87186 SC STD MICRODIL/AGAR DIL: CPT | Performed by: EMERGENCY MEDICINE

## 2024-09-09 PROCEDURE — 250N000011 HC RX IP 250 OP 636: Performed by: EMERGENCY MEDICINE

## 2024-09-09 PROCEDURE — 80048 BASIC METABOLIC PNL TOTAL CA: CPT | Performed by: EMERGENCY MEDICINE

## 2024-09-09 PROCEDURE — 36415 COLL VENOUS BLD VENIPUNCTURE: CPT | Performed by: EMERGENCY MEDICINE

## 2024-09-09 PROCEDURE — 96361 HYDRATE IV INFUSION ADD-ON: CPT

## 2024-09-09 PROCEDURE — 258N000003 HC RX IP 258 OP 636: Performed by: EMERGENCY MEDICINE

## 2024-09-09 PROCEDURE — 81001 URINALYSIS AUTO W/SCOPE: CPT | Performed by: EMERGENCY MEDICINE

## 2024-09-09 PROCEDURE — 96374 THER/PROPH/DIAG INJ IV PUSH: CPT

## 2024-09-09 PROCEDURE — 85025 COMPLETE CBC W/AUTO DIFF WBC: CPT | Performed by: EMERGENCY MEDICINE

## 2024-09-09 PROCEDURE — 87086 URINE CULTURE/COLONY COUNT: CPT | Performed by: EMERGENCY MEDICINE

## 2024-09-09 RX ORDER — CEPHALEXIN 500 MG/1
500 CAPSULE ORAL 3 TIMES DAILY
Qty: 21 CAPSULE | Refills: 0 | Status: SHIPPED | OUTPATIENT
Start: 2024-09-09 | End: 2024-09-09

## 2024-09-09 RX ORDER — CEPHALEXIN 500 MG/1
1000 CAPSULE ORAL 2 TIMES DAILY
Qty: 28 CAPSULE | Refills: 0 | Status: SHIPPED | OUTPATIENT
Start: 2024-09-09 | End: 2024-09-16

## 2024-09-09 RX ADMIN — ONDANSETRON 4 MG: 2 INJECTION INTRAMUSCULAR; INTRAVENOUS at 00:42

## 2024-09-09 RX ADMIN — SODIUM CHLORIDE 1000 ML: 9 INJECTION, SOLUTION INTRAVENOUS at 00:39

## 2024-09-09 ASSESSMENT — ACTIVITIES OF DAILY LIVING (ADL)
ADLS_ACUITY_SCORE: 38
ADLS_ACUITY_SCORE: 38

## 2024-09-09 NOTE — TELEPHONE ENCOUNTER
Call received from spouseBlas  Mid call changed to speaking with sonBlas  Consent to Communicate on Chart, includes both     Dementia - worse the past 1-2 weeks    - Past 2 days or so - Will not eat or drink anything  - Vomited after taking medication - blood thinner  - Rt Flank pain - many months but worse recently  - Foul odor to urine - past 4+ days    ED advised - [facility] or [facility] RN  [facility]      Reason for Disposition   [1] Abdominal pain AND [2] age > 60 years    Additional Information   Negative: Passed out (i.e., lost consciousness, collapsed and was not responding)   Negative: Shock suspected (e.g., cold/pale/clammy skin, too weak to stand, low BP, rapid pulse)   Negative: Difficult to awaken or acting confused (e.g., disoriented, slurred speech)   Negative: Sounds like a life-threatening emergency to the triager   Negative: [1] SEVERE pain (e.g., excruciating, scale 8-10) AND [2] present > 1 hour   Negative: [1] SEVERE pain (e.g., excruciating, scale 8-10) AND [2] not improved after pain medicine   Negative: [1] Sudden onset of severe flank pain AND [2] age > 60 years    Protocols used: Flank Pain-A-

## 2024-09-09 NOTE — ED TRIAGE NOTES
Pt poor historian d/t Alzheimer  Family reports on going right sided pain for 1.5 years with worsening pain today. Family reports pt has had poor PO intake and also reports pt vomited after taking meds for the past few days, concerned about xarelto not being taken      Triage Assessment (Adult)       Row Name 09/08/24 1621          Triage Assessment    Airway WDL WDL        Respiratory WDL    Respiratory WDL WDL        Skin Circulation/Temperature WDL    Skin Circulation/Temperature WDL WDL        Cardiac WDL    Cardiac WDL WDL        Peripheral/Neurovascular WDL    Peripheral Neurovascular WDL WDL        Cognitive/Neuro/Behavioral WDL    Cognitive/Neuro/Behavioral WDL X;orientation     Level of Consciousness confused     Arousal Level opens eyes spontaneously     Orientation disoriented to;time;situation        Pupils (CN II)    Pupil PERRLA yes     Pupil Size Left 3 mm     Pupil Size Right 3 mm        Jackie Coma Scale    Best Eye Response 4-->(E4) spontaneous     Best Motor Response 6-->(M6) obeys commands     Best Verbal Response 4-->(V4) confused     Jackie Coma Scale Score 14

## 2024-09-09 NOTE — ED PROVIDER NOTES
EMERGENCY DEPARTMENT ENCOUNTER      NAME: Zahra Fitzgerald  AGE: 79 year old female  YOB: 1945  MRN: [id number]  EVALUATION DATE & TIME: No admission date for patient encounter.    PCP: Kushal Motley    ED PROVIDER: Justin Clement M.D.      Chief Complaint   Patient presents with    Flank Pain         FINAL IMPRESSION:  1. Right lower quadrant abdominal pain    2. Acute cystitis without hematuria          ED COURSE & MEDICAL DECISION MAKING:    Pertinent Labs & Imaging studies reviewed. (See chart for details)  79 year old female presents to the Emergency Department for evaluation of abdominal pain and urine issue.  Patient has dementia.  Does have chronic right-sided abdominal pain.  Was complaining of more suprapubic pain which family states is different than normal.  Also did notice some foul-smelling urine.  Has not been eating as well but otherwise no change in behaviors.  No nausea or vomiting.  No fevers.  Does have history UTIs.  Patient is at baseline here.  Neurologic exam does not show any gross deficits.  Did do labs which show normal white count.  Kidney function is at baseline.  Urinalysis does show nitrite positive in addition to white cells.  Somewhat concerned that this may be a UTI and just early.  Given her symptomatology will treat her with Keflex.  Does not appear to be septic.  Abdomen is nontender no indication for imaging's.  Will discharge home with family.  Return for worsening symptoms.  Follow-up with primary at already scheduled appointment    11:51 PM I met with the patient to gather history and to perform my initial exam. I discussed the plan for care while in the Emergency Department.       At the conclusion of the encounter I discussed the results of all of the tests and the disposition. The questions were answered. The patient or family acknowledged understanding and was agreeable with the care plan.     Medical Decision Making  Obtained supplemental  history:Supplemental history obtained?: Documented in chart and Family Member/Significant Other  Reviewed external records: External records reviewed?: Documented in chart  Care impacted by chronic illness:Dementia, Heart Disease, and Hyperlipidemia  Care significantly affected by social determinants of health:N/A  Did you consider but not order tests?: Work up considered but not performed and documented in chart, if applicable  Did you interpret images independently?: Independent interpretation of ECG and images noted in documentation, when applicable.  Consultation discussion with other provider:Did you involve another provider (consultant, , pharmacy, etc.)?: No  Discharge. I prescribed additional prescription strength medication(s) as charted. See documentation for any additional details.             MEDICATIONS GIVEN IN THE EMERGENCY:  Medications   ondansetron (ZOFRAN) injection 4 mg (4 mg Intravenous $Given 9/9/24 0042)   sodium chloride 0.9% BOLUS 1,000 mL (1,000 mLs Intravenous $New Bag 9/9/24 0039)       NEW PRESCRIPTIONS STARTED AT TODAY'S ER VISIT  New Prescriptions    CEPHALEXIN (KEFLEX) 500 MG CAPSULE    Take 1 capsule (500 mg) by mouth 3 times daily for 7 days.          =================================================================    HPI    Patient information was obtained from: patient, patient's , patient's son    Use of : N/A       Zahra Fitzgerald is a 79 year old female with a pertinent history of dementia, hypertension, hyperlipidemia, who presents to this ED for evaluation of flank pain.    For 1.5 years, the patient has had intermittent right abdominal and flank pain. The patient has also had a few bladder infections in the past. Over the past few days, the patient had ate and drank very little. Yesterday, she had a normal bowel movement, but none since. Today, the patient has been complaining of the right flank pain which worsened this evening, as well as dry  heaving with no emesis production. She patient has no hematuria and her urine is slightly more odorous than normal; she complained of dysuria just before leaving home for the ER this evening.     The patient denies currently feeling any pain. She has no sick contacts and has had no fever.    Per chart review, the patient presented to [facility] on 08-Aug-2024 for evaluation of emergency department follow-up. Serum studies for alzheimers on 04-Mar-2024 normal. Patient living at home with  and 2 siblings. On Seroquel 12.5 mg for agitation. Right leg DVT on 30-Jul-2024, started on Xarelto; patient with high risk for PE due to being nearly nonmobile. On simvastatin. Patient often refusing medications, so simvastatin and aspirin stopped at that time. Patient stable blood pressure on atenolol.      PAST MEDICAL HISTORY:  History reviewed. No pertinent past medical history.    PAST SURGICAL HISTORY:  Past Surgical History:   Procedure Laterality Date    [facility] APPENDECTOMY      Description: Appendectomy;  Recorded: 03/12/2009;           CURRENT MEDICATIONS:    Current Facility-Administered Medications   Medication Dose Route Frequency Provider Last Rate Last Admin    denosumab (PROLIA) injection 60 mg  60 mg Subcutaneous Q6 Months         ondansetron (ZOFRAN) injection 4 mg  4 mg Intravenous Q30 Min PRN Justin Clement MD   4 mg at 09/09/24 0042     Current Outpatient Medications   Medication Sig Dispense Refill    cephALEXin (KEFLEX) 500 MG capsule Take 1 capsule (500 mg) by mouth 3 times daily for 7 days. 21 capsule 0    aspirin 81 mg chewable tablet Take 81 mg by mouth every evening      atenolol (TENORMIN) 25 MG tablet TAKE ONE TABLET BY MOUTH ONE TIME DAILY 90 tablet 3    ondansetron (ZOFRAN ODT) 4 MG ODT tab Take 1 tablet (4 mg) by mouth every 8 hours as needed for nausea or vomiting 20 tablet 1    QUEtiapine (SEROQUEL) 25 MG tablet Take 0.5 tablets (12.5 mg) by mouth at bedtime 60 tablet 0     rivaroxaban ANTICOAGULANT (XARELTO) 20 MG TABS tablet Take 1 tablet (20 mg) by mouth daily (with dinner) 90 tablet 0    Rivaroxaban ANTICOAGULANT 15 & 20 MG TBPK Starter Therapy Pack Take 15 mg by mouth 2 times daily (with meals) for 21 days, THEN 20 mg daily with food for 9 days. 51 each 0    simvastatin (ZOCOR) 10 MG tablet TAKE ONE TABLET BY MOUTH AT BEDTIME 90 tablet 3         ALLERGIES:  No Known Allergies    FAMILY HISTORY:  Family History   Problem Relation Age of Onset    Breast Cancer Mother         Unsure of age    Breast Cancer Maternal Aunt 65.00    Breast Cancer Maternal Aunt 65.00    Breast Cancer Sister 42.00       SOCIAL HISTORY:   Social History     Socioeconomic History    Marital status:    Tobacco Use    Smoking status: Never     Passive exposure: Never    Smokeless tobacco: Never   Vaping Use    Vaping status: Never Used       VITALS:  /67   Pulse 85   Temp 97  F (36.1  C)   Resp 20   Ht 1.524 m (5')   LMP  (LMP Unknown)   SpO2 94%   BMI 30.27 kg/m      PHYSICAL EXAM    Physical Exam  Vitals and nursing note reviewed.   Constitutional:       General: She is not in acute distress.     Appearance: She is not diaphoretic.   HENT:      Head: Atraumatic.      Mouth/Throat:      Pharynx: No oropharyngeal exudate.   Eyes:      General: No scleral icterus.     Pupils: Pupils are equal, round, and reactive to light.   Cardiovascular:      Rate and Rhythm: Normal rate and regular rhythm.      Heart sounds: Normal heart sounds.   Pulmonary:      Effort: No respiratory distress.      Breath sounds: Normal breath sounds.   Abdominal:      Palpations: Abdomen is soft.      Tenderness: There is no abdominal tenderness. There is no guarding or rebound. Negative signs include Saini's sign.   Musculoskeletal:         General: No tenderness.   Skin:     General: Skin is warm.      Findings: No rash.   Neurological:      General: No focal deficit present.      Mental Status: She is alert.            LAB:  All pertinent labs reviewed and interpreted.  Labs Ordered and Resulted from Time of ED Arrival to Time of ED Departure   ROUTINE UA WITH MICROSCOPIC REFLEX TO CULTURE - Abnormal       Result Value    Color Urine Yellow      Appearance Urine Turbid (*)     Glucose Urine Negative      Bilirubin Urine Negative      Ketones Urine Negative      Specific Gravity Urine 1.022      Blood Urine 0.03 mg/dL (*)     pH Urine 5.5      Protein Albumin Urine 10 (*)     Urobilinogen Urine <2.0      Nitrite Urine Positive (*)     Leukocyte Esterase Urine Negative      Bacteria Urine Many (*)     Mucus Urine Present (*)     RBC Urine 1      WBC Urine 2      Squamous Epithelials Urine <1      Hyaline Casts Urine 3 (*)    BASIC METABOLIC PANEL - Abnormal    Sodium 140      Potassium 4.6      Chloride 104      Carbon Dioxide (CO2) 25      Anion Gap 11      Urea Nitrogen 19.3      Creatinine 1.09 (*)     GFR Estimate 51 (*)     Calcium 10.4      Glucose 154 (*)    CBC WITH PLATELETS AND DIFFERENTIAL - Abnormal    WBC Count 6.2      RBC Count 4.25      Hemoglobin 13.2      Hematocrit 40.0      MCV 94      MCH 31.1      MCHC 33.0      RDW 14.6      Platelet Count 238      % Neutrophils 87      % Lymphocytes 9      % Monocytes 3      % Eosinophils 0      % Basophils 0      % Immature Granulocytes 0      NRBCs per 100 WBC 0      Absolute Neutrophils 5.4      Absolute Lymphocytes 0.6 (*)     Absolute Monocytes 0.2      Absolute Eosinophils 0.0      Absolute Basophils 0.0      Absolute Immature Granulocytes 0.0      Absolute NRBCs 0.0     URINE CULTURE       RADIOLOGY:  Reviewed all pertinent imaging. Please see official radiology report.  No orders to display         I, Kash Yanez, am serving as a scribe to document services personally performed by Dr. Justin Clement, based on my observation and the provider's statements to me. I, Justin Clement MD attest that Kash Yanez is acting in a scribe capacity, has observed my  performance of the services and has documented them in accordance with my direction.    Justin Clement M.D.  Emergency Medicine  [facility] EMERGENCY ROOM  [address] [facility] [id number] [phone]  Dept: [phone]       Justin Clement MD  09/09/24 0207

## 2024-09-10 LAB — BACTERIA UR CULT: ABNORMAL

## 2024-09-11 ENCOUNTER — TELEPHONE (OUTPATIENT)
Dept: NURSING | Facility: CLINIC | Age: 79
End: 2024-09-11
Payer: MEDICARE

## 2024-09-11 NOTE — TELEPHONE ENCOUNTER
[facility]     Reason for call: Lab Result Notification     Lab Result (including Rx patient on, if applicable).  If culture, copy of lab report at bottom.  Lab Result: urine culture  ED RX: cephALEXin 500 MG capsule  Commonly known as: KEFLEX  Take 1 capsule (500 mg) by mouth 3 times daily for 7 days  Creatinine Level (mg/dl)   Creatinine   Date Value Ref Range Status   09/09/2024 1.09 (H) 0.51 - 0.95 mg/dL Final    Creatinine clearance (ml/min), if applicable    Serum creatinine: 1.09 mg/dL (H) 09/09/24 0036  Estimated creatinine clearance: 36.6 mL/min (A)     Patient's current Symptoms:   Spoke with  who was with her in the ED.  He reports she is no longer in severe pain and urine is no longer foul smelling.    RN Recommendations/Instructions per [facility] ED lab result protocol:   [facility] ED lab result protocol utilized: urine culture    Patient/care giver notified to contact your PCP clinic or return to the Emergency department if your:  Symptoms return.  Symptoms do not improve after 3 days on antibiotic.  Symptoms do not resolve after completing antibiotic.  Symptoms worsen or other concerning symptoms.    Rosy Jose, RN

## 2024-09-12 ENCOUNTER — THERAPY VISIT (OUTPATIENT)
Dept: OCCUPATIONAL THERAPY | Facility: REHABILITATION | Age: 79
End: 2024-09-12
Payer: COMMERCIAL

## 2024-09-12 DIAGNOSIS — F02.C3 SEVERE LATE ONSET ALZHEIMER'S DEMENTIA WITH MOOD DISTURBANCE (H): ICD-10-CM

## 2024-09-12 DIAGNOSIS — Z78.9 DECREASED ACTIVITIES OF DAILY LIVING (ADL): ICD-10-CM

## 2024-09-12 DIAGNOSIS — G30.1 SEVERE LATE ONSET ALZHEIMER'S DEMENTIA WITH MOOD DISTURBANCE (H): ICD-10-CM

## 2024-09-12 DIAGNOSIS — R41.3 MEMORY CHANGES: Primary | ICD-10-CM

## 2024-09-12 PROCEDURE — 96125 COGNITIVE TEST BY HC PRO: CPT | Mod: GO | Performed by: OCCUPATIONAL THERAPIST

## 2024-09-12 NOTE — PROGRESS NOTES
"OCCUPATIONAL THERAPY EVALUATION  Type of Visit: CPT        Fall Risk Screen:  Fall screen completed by: OT  Have you fallen 2 or more times in the past year?: Yes  Have you fallen and had an injury in the past year?: Yes  Is patient a fall risk?: Yes  Fall screen comments: unable to perform TUG due to poor balance/risk    Subjective      Presenting condition or subjective complaint: Per neurology visit note on 7-30-24, \"Overall impression after our first visit and exam was that the patient had 6-7 years of progressive cognitive decline leading to a range of functional impairment and requiring help with most basic daily tasks (beyond hygiene and eating). Give prior imaging showing generalized atrophy, and symptoms, it was thought her condition was most in-line with Alzheimer's dementia. 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 She was to obtain a CPT, and serum studies, but I also indicated to her family that POA and POH should be enacted if they weren't already. I did not feel she had capacity to make medical and financial decisions for herself.\"    \"Today, the patient is sleeping as she was in the ER last night for abdominal pain due to DVT b/l. An OT evaluation was to be done in-home, but couldn't be scheduled.  She is currently living at home and have cares with her , and two siblings.  Her  is 89 and has physical limitation.  She does still yell prior to sleep and is agitated before.  This leads to her not sitting on the toilet, and screams and yells. She reports she is in pain to a greater extent during these times.\"    Date of onset: 07/30/24 (date of referral to OT)    Relevant medical history: DVT (blood clot)   Dates & types of surgery:   See EMR    Prior diagnostic imaging/testing results: CT scan     Prior therapy history for the same diagnosis, illness or injury:     no    Prior Level of Function-Similar to current    Living Environment  Social " support: With a significant other or spouse, [facility] comes once a week to check vitals etc  Type of home: House   Stairs to enter the home:         Ramp: No   Stairs inside the home: Yes   Is there a railing: Yes     Help at home: Self Cares (home health aide/personal care attendant, family, etc)  Equipment owned: Walker with wheels; Standard wheelchair; Bath bench     Employment: No    Hobbies/Interests:      Patient goals for therapy: walk and stand on own    Pain assessment: Pain denied     Objective     Cognitive Status Examination    Previous cognitive screens completed in OT or by Physician and score: none    Cognitive Performance Test    Functional History Interview:    Informants: Patient, her  Blas and her son Khris     Client s perception of memory/ thinking or functional difficulties: Unable to answer     Living situation: Lives with her  and her son      Home maintenance activities: Patient's sons assist her  with chores     Meal preparation and grocery shopping: Patient's  and son manage the cooking and shopping     Finances and paying bills: Patient's  manages all the finances with sons assistance     Medication management: Patient's  sets up and administers medications to Zahra. Patient not always compliant with swallowing medications     Driving and transportation: No driving for past couple of years     Leisure and routine activities: being outside         SUMMARY OF TEST:    The Cognitive Performance Test (CPT) is a standardized performance-based assessment to measure working memory/executive function processing capacities that underlie functional performance. Subtasks include common basic and instrumental activities of daily living (ADL/IADL) which are rated based on the manner in which patients respond to task demands of varying complexity. The total CPT score describes a level of functioning that indicates how information is processed,  implications for functional activities, potential safety risks and a recommended level of supervision or assist based on cognitive function. The highest total score on this test is in the range of 5.6 to 5.8.    DATE OF TESTIN24    RESULTS OF TESTING:                                                                                         CPT Subtest Results    MEDBOX: 3/6 SHOP/GLOVES:  PHONE:    WASH:    TOAST:    TOTAL CPT SCORE:  10/28     Average CPT Score  2.0/5.6    INTERPRETATION OF TEST RESULTS:    Based on the Cognitive Performance Test, this patient scored at CPT Level 2.0.  See CPT Levels reference below.    Summary of functional cognitive status:   Based on the CPT results and the functional interview with the patient,her  Blas and her son Zahra Pinedo is functioning with severe cognitive-functional decline. She is total assist with all cares. At times may be resistive with cares. Patient has little speech and her needs will need to be anticipated by caregivers.     Factors affecting performance:  No additional problems noted    Recommendations:    Assist for all ADL/IADL's:  dressing, grooming, bathing, feeding, Meal preparation, Cleaning, Laundry, Shopping, Finances, Driving, and Medication management   Supervision in living settin hour care. Patient should not be left alone for any amount of time.                                                 TIME FOR FUNCTIONAL HISTORY INTERVIEW: 10 minutes    TIME ADMINISTERING TEST: 20 minutes    TIME FOR INTERPRETATION AND PREPARATION OF REPORT: 20 minutes    TOTAL TIME: 50 minutes      CPT Levels Reference:    Patient's Average CPT Score:  2.0                                                                                                                                                  Individual scores range along a continuum as outlined below.  In addition to cognitive status, other factors may affect safety in a home  "environment.  Please refer to specific recommendations for this patient.    ___5.6-5.8  Normal functioning (absence of cognitive-functional disability).  Independent in managing personal affairs, monitors and directs own behavior.  Uses complex information to carry out daily activities with safety and accuracy.    Proficient with instrumental activities of daily living (IADL) and learning new activity.  Problems are anticipated, errors are avoided, and consequences of actions are considered.      ___5.0   Mild cognitive-functional disability; deficits in working memory and executive thought processes. Difficulty using complex information. Problems may be observed with recent memory, judgment, reasoning and planning ahead. May be impulsive or have difficulty anticipating consequences.  Safety:  May require assistance to plan ahead; or to manage complex medication schedules, appointments or finances.  Hazardous activities may need to be monitored or limited.  ADL:  Mild functional decline.  Able to complete basic self-care and routine household tasks.  May have difficulty with complex daily tasks such as reading, writing, meal preparation, shopping or driving.   Learns through hands on teaching. Self-centered behavior or difficulty considering the needs of others may be seen related to trouble seeing the  whole picture\". Can appear disorganized or uninhibited.    ___4.5  Mild to moderate cognitive-functional disability. Significant deficits in working memory and executive thought processes. Judgment, reasoning and planning show obvious impairment.  Distractible with inability to shift attention/actions given competing stimuli.  Difficulty with problem solving and managing details. Complex daily tasks performed with inconsistency, difficulty, or error.     Safety:  Medications should be monitored, stove use may require supervision, and driving ability may be affected.  Impaired safety awareness with inability to " anticipate potential problems.  May not recognize or respond to emergent situations. Requires frequent check-in support.   ADL:  Mild difficulty with simple everyday self-care tasks. Benefits from structured, routine activity.  Will likely need reminders to complete tasks outside of the routine. Requires assistance with planning and IADL tasks like shopping and finances. Learns concrete tasks through repetition, but performance may not generalize. Tends to be impulsive with poor insight. Self centered behavior or inability to consider the needs of others is common.    ___4.0  Moderate cognitive-functional disability; abstract to concrete thought processes. Working memory and executive function impairments are obvious. Difficulty with planning and problem solving.  Behavior is goal-directed, but unable to follow multi-step directions, is easily distracted, and may not recognize mistakes.  Inability to anticipate hazards or understand precautions.  Safety:  Recommend 24-hour supervision for safety. Supervision needed for medication management and for hazardous activities. May not be able to follow a restricted diet. Can get lost in unfamiliar surroundings. Generally, persons functioning at level 4 should not be driving.   ADL:  Some decline in quality or frequency of ADL.  Oaks enhanced by use of a routine, simple concrete directions, and caregiver set-up of needed items. Complex tasks such as money or home management typically requires assistance.  Relies heavily on vision to guide behavior; will ignore objects/hazards not in plain sight and can be distracted by irrelevant objects. Often has poor insight.  Able to carry out social conversation and may verbally  cover  for deficits leading caregivers to believe they are capable of functioning independently.       ___3.5  Moderate cognitive-functional disability; increased cues needed for task completion. Aware of concrete task steps but needs prompting or cues  to initiate and complete simple tasks. Attention span is limited, simple directions may need to be repeated, and re-focus to a topic or task may be required.  Safety:  24-hour supervision required for safety and for assistance with daily tasks. Assistance required with medications, and access to medication should be limited. Meals, nutrition and dietary restrictions need to be monitored.  All hazardous activities should be restricted or supervised. Should not drive. Prone to wandering and can become lost.  ADL:  Moderate functional decline. Familiar tasks usually requires set-up of supplies and directions to complete steps. May need objects handed to them for task initiation. Function best with a set schedule in familiar surroundings with familiar people. All complex tasks must be done by others. Vocabulary is diminished and speech often unfocused.     ___3.0  Moderate to severe functional decline; unable to complete self care tasks without assist-Can handle and use objects, but is unaware of task goals or outcomes. Caregivers are needed to give direction throughout task and set supplies in front of the person. The person may need verbal cues, demonstration, and hand-over-hand assist. Use short direct words. Routine and structure are important. Resisting care may occur as the person is confused about the world around them. Conversation is typically tangential and may not make sense or seem relevant. Cannot learn new information. Not oriented to time or place.    _X__2.5  Severe functional decline; Poor use of familiar objects. Total assist with cares. May be resistive with cares. Little speech. Person is often sensory deprived and may benefit from sensory programs.         Assessment & Plan   CLINICAL IMPRESSIONS  Medical Diagnosis: Severe late onset Alzheimer's dementia with mood disturbance    Treatment Diagnosis: memory changes, decreased ADL and IADL function    Impression/Assessment: CPT completed. Patient has  severe cognitive decline. See recommendations above. Patient's  is her main caregiver. Recommend in home PCA to assist patient and give spouse some respite as needed.    Clinical Decision Making (Complexity):  Assessment of Occupational Performance: 5 or more Performance Deficits  Occupational Performance Limitations: bathing/showering, toileting, dressing, feeding, functional mobility, hygiene and grooming, communication management, driving and community mobility, health management and maintenance, home establishment and management, meal preparation and cleanup, shopping, leisure activities, and social participation  Clinical Decision Making (Complexity): Low complexity    PLAN OF CARE  Treatment Interventions:  Interventions: Standardized Testing    Long Term Goals: N/A         Frequency of Treatment: N/A  Duration of Treatment: N/A     Recommended Referrals to Other Professionals: no  Education Assessment: Learner/Method: Patient;Family;Significant Other     Risks and benefits of evaluation/treatment have been explained.   Patient/Family/caregiver agrees with Plan of Care.     Evaluation Time: 50 minutes standardized testing          Signing Clinician: Lisa Dejesus OT

## 2024-09-20 ENCOUNTER — TELEPHONE (OUTPATIENT)
Dept: INTERNAL MEDICINE | Facility: CLINIC | Age: 79
End: 2024-09-20
Payer: MEDICARE

## 2024-09-20 DIAGNOSIS — R35.0 URINE FREQUENCY: Primary | ICD-10-CM

## 2024-09-20 NOTE — TELEPHONE ENCOUNTER
S-(situation):  calling in because of wife's recent ED trip and was diagnosed with a UTI. She was prescribed antibiotics at that time.     B-(background):     9/8/24 ED visit  Pertinent Labs & Imaging studies reviewed. (See chart for details)  79 year old female presents to the Emergency Department for evaluation of abdominal pain and urine issue.  Patient has dementia.  Does have chronic right-sided abdominal pain.  Was complaining of more suprapubic pain which family states is different than normal.  Also did notice some foul-smelling urine.  Has not been eating as well but otherwise no change in behaviors.  No nausea or vomiting.  No fevers.  Does have history UTIs.  Patient is at baseline here.  Neurologic exam does not show any gross deficits.  Did do labs which show normal white count.  Kidney function is at baseline.  Urinalysis does show nitrite positive in addition to white cells.  Somewhat concerned that this may be a UTI and just early.  Given her symptomatology will treat her with Keflex.  Does not appear to be septic.  Abdomen is nontender no indication for imaging's.  Will discharge home with family.  Return for worsening symptoms.  Follow-up with primary at already scheduled appointment      A-(assessment):    Patient has dementia and  said she is having pain in the right side- had tremendous pain last night but improved with laying down.    Or if it is her mind that is telling her that she is having the pain     Has been in ER for 5 or 6 times for this pain       R-(recommendations): Huddled with Dr. Motley regarding call, she advised that if patient was having ongoing pain- needs to go back and be evaluated in the ED, If no pain, come in for a urine sample and get tested.     Outgoing call to :     He said she is still in bed sleeping right now.     He said there is no way he can come today, would maybe be Monday where he could come in to get the stuff for  a sample.     He  will call back to schedule a lab only appointment.        Ruthie FRANCO RN

## 2024-09-22 DIAGNOSIS — I10 ESSENTIAL HYPERTENSION: ICD-10-CM

## 2024-09-23 RX ORDER — ATENOLOL 25 MG/1
TABLET ORAL
Qty: 90 TABLET | Refills: 3 | Status: SHIPPED | OUTPATIENT
Start: 2024-09-23

## 2024-09-24 ENCOUNTER — LAB (OUTPATIENT)
Dept: LAB | Facility: CLINIC | Age: 79
End: 2024-09-24
Payer: COMMERCIAL

## 2024-09-24 DIAGNOSIS — R35.0 URINE FREQUENCY: ICD-10-CM

## 2024-10-27 ENCOUNTER — NURSE TRIAGE (OUTPATIENT)
Dept: NURSING | Facility: CLINIC | Age: 79
End: 2024-10-27
Payer: MEDICARE

## 2024-10-28 ENCOUNTER — TELEPHONE (OUTPATIENT)
Dept: INTERNAL MEDICINE | Facility: CLINIC | Age: 79
End: 2024-10-28

## 2024-10-28 ENCOUNTER — APPOINTMENT (OUTPATIENT)
Dept: LAB | Facility: CLINIC | Age: 79
End: 2024-10-28
Payer: COMMERCIAL

## 2024-10-28 DIAGNOSIS — N39.0 URINARY TRACT INFECTION WITHOUT HEMATURIA, SITE UNSPECIFIED: Primary | ICD-10-CM

## 2024-10-28 LAB
ALBUMIN UR-MCNC: NEGATIVE MG/DL
APPEARANCE UR: ABNORMAL
BACTERIA #/AREA URNS HPF: ABNORMAL /HPF
BILIRUB UR QL STRIP: NEGATIVE
COLOR UR AUTO: YELLOW
GLUCOSE UR STRIP-MCNC: NEGATIVE MG/DL
HGB UR QL STRIP: ABNORMAL
KETONES UR STRIP-MCNC: NEGATIVE MG/DL
LEUKOCYTE ESTERASE UR QL STRIP: ABNORMAL
NITRATE UR QL: POSITIVE
PH UR STRIP: 5 [PH] (ref 5–8)
RBC #/AREA URNS AUTO: ABNORMAL /HPF
SP GR UR STRIP: 1.02 (ref 1–1.03)
SQUAMOUS #/AREA URNS AUTO: ABNORMAL /LPF
UROBILINOGEN UR STRIP-ACNC: 0.2 E.U./DL
WBC #/AREA URNS AUTO: ABNORMAL /HPF

## 2024-10-28 RX ORDER — CEPHALEXIN 500 MG/1
500 CAPSULE ORAL 2 TIMES DAILY
Qty: 14 CAPSULE | Refills: 0 | Status: SHIPPED | OUTPATIENT
Start: 2024-10-28 | End: 2024-11-04

## 2024-10-28 NOTE — TELEPHONE ENCOUNTER
Patient's  calling. Consent to communicate is in the chart. Patient has abdominal pain every night at about 1030. Patient has been seen multiple times for this, and they are never able to find anything wrong. The last time patient was seen for the abdominal pain, she was prescribed antibiotics.     Patient's  caught a urine sample, and is wondering if he can drop the sample at the emergency department.     There is no current order for a urine sample.     Routed to Dr. Motley to request orders for UA/UC. Blas plans to drop the urine sample at the [facility] in the morning.     Madhuri Cervantes RN  [facility]  October 27, 2024, 7:48 PM    Reason for Disposition   [1] Caller requesting NON-URGENT health information AND [2] PCP's office is the best resource    Additional Information   Negative: Caller is angry or rude (e.g., hangs up, verbally abusive, yelling)   Negative: Caller hangs up   Negative: [1] Caller is not with the adult (patient) AND [2] reporting urgent symptoms   Negative: Lab result questions   Negative: Medication questions   Negative: Caller can't be reached by phone   Negative: Caller has already spoken to PCP or another triager   Negative: RN needs further essential information from caller in order to complete triage   Negative: Requesting regular office appointment    Protocols used: No Contact or Duplicate Contact Call-A-, Information Only Call - No Triage-A-

## 2024-10-28 NOTE — TELEPHONE ENCOUNTER
Dayne Ferrari MD  10/28/2024 10:44 AM CDT Back to Top      Tell patient     Bacteriuria and pyuria specimen October 27, 2024.  Sent prescription to her [facility] pharmacy for cephalexin 500 mg twice a day 7 days, as we await culture results       Call to patient's  Blas and relayed provider message. States patient hasn't been taking her other antibiotic pills as she has been spitting them out. The RX sent in by Dr. Ferrari is a capsule and he should be able to sprinkle the powder into something for her to eat. He will give this a try and let us know if that doesn't work. Instructed for him to call us back in a couple of days if she is not improving. He stated understanding.

## 2024-10-28 NOTE — TELEPHONE ENCOUNTER
Please see Nurse Triage encounter for follow-up. Closing this encounter.     ----- Message from LUCY NEGRON sent at 10/28/2024 10:44 AM CDT -----  Tell patient    Bacteriuria and pyuria specimen October 27, 2024.  Sent prescription to her [facility] pharmacy for cephalexin 500 mg twice a day 7 days, as we await culture results

## 2024-10-28 NOTE — PROGRESS NOTES
Bacteriuria and pyuria specimen October 27, 2024.  Sent prescription to her [facility] pharmacy for cephalexin 500 mg twice a day 7 days, as we await culture results

## 2024-10-30 ENCOUNTER — TELEPHONE (OUTPATIENT)
Dept: INTERNAL MEDICINE | Facility: CLINIC | Age: 79
End: 2024-10-30

## 2024-10-30 ENCOUNTER — APPOINTMENT (OUTPATIENT)
Dept: CT IMAGING | Facility: HOSPITAL | Age: 79
End: 2024-10-30
Attending: EMERGENCY MEDICINE
Payer: MEDICARE

## 2024-10-30 ENCOUNTER — HOSPITAL ENCOUNTER (OUTPATIENT)
Facility: HOSPITAL | Age: 79
Setting detail: OBSERVATION
Discharge: HOME | End: 2024-10-30
Attending: EMERGENCY MEDICINE | Admitting: EMERGENCY MEDICINE
Payer: MEDICARE

## 2024-10-30 VITALS
DIASTOLIC BLOOD PRESSURE: 64 MMHG | TEMPERATURE: 98 F | SYSTOLIC BLOOD PRESSURE: 139 MMHG | HEART RATE: 72 BPM | HEIGHT: 65 IN | RESPIRATION RATE: 19 BRPM | OXYGEN SATURATION: 96 % | WEIGHT: 152.2 LBS | BODY MASS INDEX: 25.36 KG/M2
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
## 2024-10-30 DIAGNOSIS — R53.1 GENERAL WEAKNESS: ICD-10-CM

## 2024-10-30 DIAGNOSIS — G30.1 SEVERE LATE ONSET ALZHEIMER'S DEMENTIA WITH MOOD DISTURBANCE (H): Primary | ICD-10-CM

## 2024-10-30 DIAGNOSIS — S09.90XA INJURY OF HEAD, INITIAL ENCOUNTER: ICD-10-CM

## 2024-10-30 DIAGNOSIS — F02.C3 SEVERE LATE ONSET ALZHEIMER'S DEMENTIA WITH MOOD DISTURBANCE (H): Primary | ICD-10-CM

## 2024-10-30 LAB
ALBUMIN SERPL BCG-MCNC: 3.9 G/DL (ref 3.5–5.2)
ALBUMIN UR-MCNC: NEGATIVE MG/DL
ALP SERPL-CCNC: 80 U/L (ref 40–150)
ALT SERPL W P-5'-P-CCNC: 13 U/L (ref 0–50)
ANION GAP SERPL CALCULATED.3IONS-SCNC: 11 MMOL/L (ref 7–15)
APPEARANCE UR: CLEAR
APTT PPP: 40 SECONDS (ref 22–38)
AST SERPL W P-5'-P-CCNC: 21 U/L (ref 0–45)
BACTERIA UR CULT: ABNORMAL
BASOPHILS # BLD AUTO: 0.1 10E3/UL (ref 0–0.2)
BASOPHILS NFR BLD AUTO: 1 %
BILIRUB SERPL-MCNC: 0.3 MG/DL
BILIRUB UR QL STRIP: NEGATIVE
BUN SERPL-MCNC: 21.2 MG/DL (ref 8–23)
CALCIUM SERPL-MCNC: 10 MG/DL (ref 8.8–10.4)
CHLORIDE SERPL-SCNC: 106 MMOL/L (ref 98–107)
COLOR UR AUTO: ABNORMAL
CREAT SERPL-MCNC: 1.08 MG/DL (ref 0.51–0.95)
EGFRCR SERPLBLD CKD-EPI 2021: 52 ML/MIN/1.73M2
EOSINOPHIL # BLD AUTO: 0.1 10E3/UL (ref 0–0.7)
EOSINOPHIL NFR BLD AUTO: 2 %
ERYTHROCYTE [DISTWIDTH] IN BLOOD BY AUTOMATED COUNT: 13.2 % (ref 10–15)
ETHANOL SERPL-MCNC: <0.01 G/DL
FLUAV RNA SPEC QL NAA+PROBE: NEGATIVE
FLUBV RNA RESP QL NAA+PROBE: NEGATIVE
GLUCOSE BLDC GLUCOMTR-MCNC: 105 MG/DL (ref 70–99)
GLUCOSE SERPL-MCNC: 102 MG/DL (ref 70–99)
GLUCOSE UR STRIP-MCNC: NEGATIVE MG/DL
HCO3 SERPL-SCNC: 27 MMOL/L (ref 22–29)
HCT VFR BLD AUTO: 40.1 % (ref 35–47)
HGB BLD-MCNC: 13.4 G/DL (ref 11.7–15.7)
HGB UR QL STRIP: NEGATIVE
HYALINE CASTS: 1 /LPF
IMM GRANULOCYTES # BLD: 0 10E3/UL
IMM GRANULOCYTES NFR BLD: 1 %
INR PPP: 2.4 (ref 0.85–1.15)
KETONES UR STRIP-MCNC: NEGATIVE MG/DL
LACTATE SERPL-SCNC: 1.6 MMOL/L (ref 0.7–2)
LEUKOCYTE ESTERASE UR QL STRIP: ABNORMAL
LYMPHOCYTES # BLD AUTO: 1.5 10E3/UL (ref 0.8–5.3)
LYMPHOCYTES NFR BLD AUTO: 22 %
MCH RBC QN AUTO: 30.8 PG (ref 26.5–33)
MCHC RBC AUTO-ENTMCNC: 33.4 G/DL (ref 31.5–36.5)
MCV RBC AUTO: 92 FL (ref 78–100)
MONOCYTES # BLD AUTO: 0.7 10E3/UL (ref 0–1.3)
MONOCYTES NFR BLD AUTO: 10 %
MUCOUS THREADS #/AREA URNS LPF: PRESENT /LPF
NEUTROPHILS # BLD AUTO: 4.5 10E3/UL (ref 1.6–8.3)
NEUTROPHILS NFR BLD AUTO: 65 %
NITRATE UR QL: NEGATIVE
NRBC # BLD AUTO: 0 10E3/UL
NRBC BLD AUTO-RTO: 0 /100
PH UR STRIP: 6.5 [PH] (ref 5–7)
PLATELET # BLD AUTO: 261 10E3/UL (ref 150–450)
POTASSIUM SERPL-SCNC: 4.2 MMOL/L (ref 3.4–5.3)
PROT SERPL-MCNC: 7.2 G/DL (ref 6.4–8.3)
RBC # BLD AUTO: 4.35 10E6/UL (ref 3.8–5.2)
RBC URINE: 4 /HPF
RSV RNA SPEC NAA+PROBE: NEGATIVE
SARS-COV-2 RNA RESP QL NAA+PROBE: NEGATIVE
SODIUM SERPL-SCNC: 144 MMOL/L (ref 135–145)
SP GR UR STRIP: 1.02 (ref 1–1.03)
SQUAMOUS EPITHELIAL: 1 /HPF
TROPONIN T SERPL HS-MCNC: 12 NG/L
TROPONIN T SERPL HS-MCNC: 13 NG/L
UROBILINOGEN UR STRIP-MCNC: <2 MG/DL
WBC # BLD AUTO: 6.9 10E3/UL (ref 4–11)
WBC URINE: 13 /HPF

## 2024-10-30 PROCEDURE — 85730 THROMBOPLASTIN TIME PARTIAL: CPT | Performed by: EMERGENCY MEDICINE

## 2024-10-30 PROCEDURE — 82962 GLUCOSE BLOOD TEST: CPT

## 2024-10-30 PROCEDURE — 83605 ASSAY OF LACTIC ACID: CPT | Performed by: EMERGENCY MEDICINE

## 2024-10-30 PROCEDURE — 96374 THER/PROPH/DIAG INJ IV PUSH: CPT | Mod: 59

## 2024-10-30 PROCEDURE — 93005 ELECTROCARDIOGRAM TRACING: CPT

## 2024-10-30 PROCEDURE — G0378 HOSPITAL OBSERVATION PER HR: HCPCS

## 2024-10-30 PROCEDURE — 81003 URINALYSIS AUTO W/O SCOPE: CPT | Performed by: EMERGENCY MEDICINE

## 2024-10-30 PROCEDURE — 85049 AUTOMATED PLATELET COUNT: CPT | Performed by: EMERGENCY MEDICINE

## 2024-10-30 PROCEDURE — G1010 CDSM STANSON: HCPCS

## 2024-10-30 PROCEDURE — 87637 SARSCOV2&INF A&B&RSV AMP PRB: CPT | Performed by: EMERGENCY MEDICINE

## 2024-10-30 PROCEDURE — 250N000011 HC RX IP 250 OP 636: Performed by: EMERGENCY MEDICINE

## 2024-10-30 PROCEDURE — 74177 CT ABD & PELVIS W/CONTRAST: CPT | Mod: MG

## 2024-10-30 PROCEDURE — 93005 ELECTROCARDIOGRAM TRACING: CPT | Performed by: EMERGENCY MEDICINE

## 2024-10-30 PROCEDURE — 82077 ASSAY SPEC XCP UR&BREATH IA: CPT | Performed by: EMERGENCY MEDICINE

## 2024-10-30 PROCEDURE — 85610 PROTHROMBIN TIME: CPT | Performed by: EMERGENCY MEDICINE

## 2024-10-30 PROCEDURE — 999N000104 CT THORACIC SPINE RECONSTRUCTED

## 2024-10-30 PROCEDURE — 84484 ASSAY OF TROPONIN QUANT: CPT | Performed by: EMERGENCY MEDICINE

## 2024-10-30 PROCEDURE — 36415 COLL VENOUS BLD VENIPUNCTURE: CPT | Performed by: EMERGENCY MEDICINE

## 2024-10-30 PROCEDURE — 99291 CRITICAL CARE FIRST HOUR: CPT | Mod: 25

## 2024-10-30 PROCEDURE — 87040 BLOOD CULTURE FOR BACTERIA: CPT | Performed by: EMERGENCY MEDICINE

## 2024-10-30 PROCEDURE — 85025 COMPLETE CBC W/AUTO DIFF WBC: CPT | Performed by: EMERGENCY MEDICINE

## 2024-10-30 PROCEDURE — 999N000104 CT LUMBAR SPINE RECONSTRUCTED

## 2024-10-30 PROCEDURE — 80053 COMPREHEN METABOLIC PANEL: CPT | Performed by: EMERGENCY MEDICINE

## 2024-10-30 PROCEDURE — 87086 URINE CULTURE/COLONY COUNT: CPT | Performed by: EMERGENCY MEDICINE

## 2024-10-30 RX ORDER — LIDOCAINE 40 MG/G
CREAM TOPICAL
Status: DISCONTINUED | OUTPATIENT
Start: 2024-10-30 | End: 2024-10-30 | Stop reason: HOSPADM

## 2024-10-30 RX ORDER — HYDROMORPHONE HCL IN WATER/PF 6 MG/30 ML
0.2 PATIENT CONTROLLED ANALGESIA SYRINGE INTRAVENOUS
Status: DISCONTINUED | OUTPATIENT
Start: 2024-10-30 | End: 2024-10-30 | Stop reason: HOSPADM

## 2024-10-30 RX ORDER — IOPAMIDOL 755 MG/ML
75 INJECTION, SOLUTION INTRAVASCULAR ONCE
Status: COMPLETED | OUTPATIENT
Start: 2024-10-30 | End: 2024-10-30

## 2024-10-30 RX ADMIN — HYDROMORPHONE HYDROCHLORIDE 0.2 MG: 0.2 INJECTION, SOLUTION INTRAMUSCULAR; INTRAVENOUS; SUBCUTANEOUS at 13:56

## 2024-10-30 RX ADMIN — IOPAMIDOL 75 ML: 755 INJECTION, SOLUTION INTRAVENOUS at 14:33

## 2024-10-30 ASSESSMENT — ACTIVITIES OF DAILY LIVING (ADL)
ADLS_ACUITY_SCORE: 0
DEPENDENT_IADLS:: CLEANING;COOKING;LAUNDRY;SHOPPING;MEAL PREPARATION;MEDICATION MANAGEMENT;MONEY MANAGEMENT;TRANSPORTATION;INCONTINENCE

## 2024-10-30 NOTE — DISCHARGE INSTRUCTIONS
Continue your hospice care and follow-up primary care doctor for additional resources and look at possibly a nursing home

## 2024-10-30 NOTE — ED NOTES
[facility] ED Handoff Report    ED Chief Complaint: Fall; Head Injury.    ED Diagnosis:  (W19.XXXA) Fall, initial encounter  Comment:   Plan:     (S09.90XA) Injury of head, initial encounter  Comment:   Plan:     (R53.1) General weakness  Comment:   Plan:        PMH:  No past medical history on file.     Code Status:  Prior     Falls Risk: Yes Band: Applied    Current Living Situation/Residence: lives with a significant other and son, Galen.     Elimination Status: Continent: wears briefs     Activity Level: SBA w/ walker; Patient's family reports patient has been using a chair at home more often.     Patients Preferred Language:  English     Needed: No    Vital Signs:  /64   Pulse 72   Temp 98  F (36.7  C) (Oral)   Resp 21   Wt 69 kg (152 lb 3.2 oz)   LMP  (LMP Unknown)   SpO2 100%   BMI 29.72 kg/m       Cardiac Rhythm: Sinus Rhythm.    Pain Score: Patient is unable to quantify.  Writer has been using PAINAD Scoring for Dementia.      Is the Patient Confused:  Yes; alert and oriented to self only.     Last Food or Drink: 10/29/24 at 2100.    Focused Assessment: Patient arrived to ED via EMS services from home where she sustained a fall today.  Patient's spouse reports patient was sitting in a chair and slid out of chair landing on her buttocks then hitting her head into the metal railing.  Trauma alert initiated.  Patient received IV Dilaudid x1 in ED. Patient on hover mat for transferring.  Attempted to ambulate but unable to bear weight. Alert and oriented to self only.      Tests Performed: Done: Labs and Imaging    Treatments Provided:  See MAR.    Family Dynamics/Concerns: No    Family Updated On Visitor Policy: Yes    Plan of Care Communicated to Family: Yes    Who Was Updated about Plan of Care: Patient's son and spouse were at patient's bedside.  Son Galen, will be coming in.     Belongings Checklist Done and Signed by Patient: No    Belongings Sent with Patient:  Yes.    Medications sent with patient: None in the ED at this time.     Covid: symptomatic, negative    Additional Information: Patient on Hospice per family report.     RN: Rosemary Orr RN 10/30/2024 3:32 PM

## 2024-10-30 NOTE — CONSULTS
"Care Management Initial Consult    General Information  Assessment completed with: Patient, Children, Zahra and son Álvaro and Álvaor called other son Lui on the phone to also discuss discharge planning  Type of CM/SW Visit: Offer D/C Planning    Primary Care Provider verified and updated as needed: Yes   Readmission within the last 30 days: no previous admission in last 30 days      Reason for Consult: discharge planning, end of life/hospice  Advance Care Planning: Advance Care Planning Reviewed: present on chart          Communication Assessment  Patient's communication style: spoken language (English or Bilingual)             Cognitive  Cognitive/Neuro/Behavioral: has dementia at baseline.    Living Environment:   People in home: spouse, child(hector), adult  Zahra and  Blas and son \"Álvaro lives there right now to help care for his mother\"  Current living Arrangements: house      Able to return to prior arrangements: yes       Family/Social Support:  Care provided by: self, spouse/significant other, child(hector)  Provides care for: no one, unable/limited ability to care for self  Marital Status:   Support system: , Children  Blas       Description of Support System: Supportive, Involved    Support Assessment: Adequate family and caregiver support, Adequate social supports, Patient communicates needs well met    Current Resources:   Patient receiving home care services: No        Community Resources: Hospice ([facility])  Equipment currently used at home: wheelchair, manual, walker, rolling (\"pivot trasfers to her wheelchair. Only able to use the FWW with son following her and for very short distance. Almost always uses the wheelchair\".)  Supplies currently used at home: Incontinence Supplies, Other (\"glasses\")    Employment/Financial:  Employment Status: retired        Financial Concerns: none   Referral to Financial Worker: No       Does the patient's insurance plan have a 3 " "day qualifying hospital stay waiver?  Yes     Which insurance plan 3 day waiver is available? [employer]    Will the waiver be used for post-acute placement? No    Lifestyle & Psychosocial Needs:  Social Drivers of Health     Food Insecurity: Not on file   Depression: Not at risk (2/22/2024)    PHQ-2     PHQ-2 Score: 0   Housing Stability: Not on file   Tobacco Use: Low Risk  (9/8/2024)    Patient History     Smoking Tobacco Use: Never     Smokeless Tobacco Use: Never     Passive Exposure: Never   Financial Resource Strain: Not on file   Alcohol Use: Not on file   Transportation Needs: Not on file   Physical Activity: Not on file   Interpersonal Safety: Not on file   Stress: Not on file   Social Connections: Not on file   Health Literacy: Not on file       Functional Status:  Prior to admission patient needed assistance:   Dependent ADLs:: Wheelchair-with assist, Wheelchair-independent, Bathing, Dressing, Grooming, Incontinence, Positioning, Transfers, Toileting  Dependent IADLs:: Cleaning, Cooking, Laundry, Shopping, Meal Preparation, Medication Management, Money Management, Transportation, Incontinence  Assesssment of Functional Status: At functional baseline (per son, \"she is at baseline\")    Mental Health Status:  Mental Health Status: No Current Concerns       Chemical Dependency Status:  Chemical Dependency Status: No Current Concerns             Values/Beliefs:  Spiritual, Cultural Beliefs, Synagogue Practices, Values that affect care: no               Discussed  Partnership in Safe Discharge Planning  document with patient/family: No    Additional Information:  See below    Next Steps: see below  Care Management Discharge Note    Discharge Date: 10/31/2024. Discharging from ER today 10/30/24       Discharge Disposition: Home, Hospice    Discharge Services: None    Discharge DME: None    Discharge Transportation: family or friend will provide    Private pay costs discussed: transportation costs son Álvaro here " "and taking her home.    Does the patient's insurance plan have a 3 day qualifying hospital stay waiver?  Yes     Which insurance plan 3 day waiver is available? [employer]    Will the waiver be used for post-acute placement? Undetermined at this time    PAS Confirmation Code:    Patient/family educated on Medicare website which has current facility and service quality ratings: yes    Education Provided on the Discharge Plan: Yes  Persons Notified of Discharge Plans: Dr. Jorge, Dr. Velásquez, sons Álvaro and Lui, [facility] hosp[facility]  Patient/Family in Agreement with the Plan:  yes. Son Álvaro states, \"she is at her normal baseline for being ornery and for yelling out. This is her dementia. She is a pivot transfer of 1 at home already. When she moved to the wheelchair it is her normal like it is at home\".    Handoff Referral Completed: Yes, [facility] PCP: Internal handoff referral completed    Additional Information:  When I went into the room it was Zahra and her son Blas \"Álvaro\". Álvaro states, \"she has dementia and is confused at baseline. Initially my brother Lui and dad Blas were here with her. But they wanted me to come because I know her baseline with her mental status and also her mobility. The doctor had told them that she had to stay in the hospital so we could get increased hospice services.\" I had a long conversation with Álvaro about her current hospice services and what could be increased. We also talked about other discharge planning ideas such as: Private pay home care, increasing hospice, nursing home or assisted living. Given [facility] handout and Senior Linkage line. Also told him to call his [facility] hospice staff to ask for more help and that I would call them too.    Álvaro was present when she got up to the wheelchair. She was \"hollering like she normally does. She is at her baseline with memory and actions and her ability to pivot with the help of 1.\"    Álvaro states, \"she " "lives in a house with her  Blas and now I also live there helping them because of her need for increased cares. I have my own house, but I am always with them now. I work 2 jobs, but I am there in the evenings always and I am her main caregiver in the evenings. And my Dad is her caregiver during the day\".    She gets assistance with all ADLs and all IADLs. \"She now only pivot trasfers to her wheelchair. Only able to use the FWW with son following her and for very short distance. Almost always uses the wheelchair and needs assist of 1 for any standing\". Álvaro states, \"she currently gets help from a hospice bath aide once a week and she tells us she can come twice a week if needed. Otherwise the RN comes occasionally from hospice. Her  or I do all her other ADL and IADL needs.\"    I told the son about her observation admission and he states, \"Wait. I have to call my brother Lui. I don't think we want that if what you are telling me is true. We were only coming in because the ER doctor told my dad and brother she needed to come into the hospital for us to ask hospice for more help.\" I had told them that we just have to call hospice and ask them for what additional services she qualifies and then after that if more help is needed it is private pay. Son stated, \"understanding of this. We have always been told by hospice we can just call them and they will increase her services, so we don't want her to come into the hospital if she doesn't have to. And we don't want the observation bill.\" Both sons were on the phone and both decided, \"we want to take her back home. Álvaro will transport her home in his truck\".    I alerted both Dr. Velásquez and Dr. Jorge about these wishes from the pt and family. Pt was discharged from the ER before admission. Dr. Jorge didn't see her.    I called [facility] and alerted them that she discharged back home. I also alerted them about the patient/family expressing " needs for more services.    CM to follow for medical progression of care, discharge recommendations, and final discharge plan. Writer verified patient demographics and updated any changes needed in the patient chart.    Jacki Barrios RN

## 2024-10-30 NOTE — TELEPHONE ENCOUNTER
#1 Attempted to contact patient. No Answer. [facility]    ----- Message from LUCY NEGRON sent at 10/30/2024  9:52 AM CDT -----  Please call the patient and tell her that the laboratory has determined that the E. coli bacteria is susceptible to first generation cephalosporin, therefore the cephalexin that I prescribed for her should work.

## 2024-10-30 NOTE — ED NOTES
Bed: JN-01  Expected date:   Expected time:   Means of arrival: Ambulance  Comments:  [place]: Fall, head inj, on thinners

## 2024-10-30 NOTE — ED TRIAGE NOTES
Patient arrives to ED from home via [facility] due to a fall today.  Patient did hit her head and is on blood thinners.  Patient has history of dementia.  Trauma alert initiated.  Dr. Velásquez at bedside, no C-collar at this time.  Per EMS, blood pressure 139/90 mmHg, 99% on RA and heart rate in the 80's.  Alert and oriented x1.

## 2024-10-30 NOTE — ED PROVIDER NOTES
EMERGENCY DEPARTMENT ENCOUNTER      NAME: Zahra Fitzgerald  AGE: 79 year old female  YOB: 1945  MRN: [id number]  EVALUATION DATE & TIME: 10/30/2024 12:54 PM    PCP: Kushal Motley    ED PROVIDER: Pedro Velásquez MD      Chief Complaint   Patient presents with    Fall    Head Injury         FINAL IMPRESSION:  1. Fall, initial encounter    2. Injury of head, initial encounter    3. General weakness          ED COURSE & MEDICAL DECISION MAKING:    Pertinent Labs & Imaging studies reviewed. (See chart for details)  79 year old female presents to the Emergency Department for evaluation of ground-level fall    On blood thinners reportedly hit head.    Initial report only from EMS since patient is confused.  Patient is anxious and has pain when moving right hip and logrolling to low back    With hitting head will obtain CT head, CT C-spine pain with logrolling CT thoracic and lumbar spine and pelvis    With confusion and fall on blood thinners obtain CT chest abdomen pelvis    Given Dilaudid for pain    Family arrived later and states she is back at baseline oftentimes will have intermittent right sided pain with no known explanation.  They are not aware of normal pressure hydrocephalus diagnosis.  Normally gets around with a walker    Today fell out of a wheeled office chair    Additional history of her son and  is on hospice.  Has hospice nurse come out twice a week to help with bathing.  Home alone with  during the day who cannot lift her up.  At night there is another son that stays with them.    On exam even with 2 cannot get her out of the gurney.  Will likely fall if goes home.  Likely needs extra assistance at home.  Family agrees with plan for admission to the hospital for care management to ensure safe discharge plan especially in the setting of anticoag    Discussed with care management      ED Course as of 10/30/24 1559   Wed Oct 30, 2024   1305 Brought in by EMS after a reported  ground-level fall.   1306 Patient planes of pain had a toe and then was able to ambulate per EMS to the stretcher.  On exam she able to lift her legs but they hurts.  She has pain to palpation in the low back   1306 Patient is confused.  Does not know what year it is.  Cannot provide a complete history   1306 Plan for trauma screen, fall workup including EKG, UA, lactic, blood culture, metabolic panel   1306 Likely admission   1423 Family arrived and states she actually fell out of her wheelchair onto her butt and struck her head.  Reportedly is at baseline mentation.  Hurts to anywhere where we touch her.  She is moving all extremities   1423 Troponin T, High Sensitivity: 13   1423 UA possibly suggestive of UTI but negative nitrites but increased white blood cells.   1424 Influenza A: Negative   1424 Influenza B: Negative   1424 Resp Syncytial Virus: Negative   1424 SARS CoV2 PCR: Negative   1424 INR(!): 2.40  On blood thinner   1424 GLUCOSE BY METER POCT(!): 105   1424 Lactic Acid: 1.6  Sepsis unlikely   1424 WBC: 6.9   1424 Hemoglobin: 13.4   1424 Platelet Count: 261   1425 Urine 10/27 pos for E coli has been on Keflex per chart review in [facility] everywhere from visit 10/27 in clinic   1442 CT head shows changes suggest normal pressure hydrocephalus.  Patient does have a history of this.  No intracranial hemorrhage seen   1443 No cervical fracture noted on CT imaging   1506 CT imaging spine does not show fracture does show scoliosis   1542 Unaware patient is on hospice towards the end of her ER evaluation   1548 I spoke with Dr. Jorge who agrees to admit patient.    1557 After speaking with the hospitalist a different son showed up and states that he does not want his mother admitted and now her  who is decision-maker now wants to take patient home.  Care management met with patient.  Plan for discharge back to hospice.  Care management discussed with the hospitalist about plan for discharge home        Medical Decision Making  Obtained supplemental history:Supplemental history obtained?: Documented in chart, Caregiver, and Family Member/Significant Other son and   Reviewed external records: External records reviewed?: Documented in chart  Care impacted by chronic illness:Dementia  Did you consider but not order tests?: Work up considered but not performed and documented in chart, if applicable  Did you interpret images independently?: Independent interpretation of ECG and images noted in documentation, when applicable.  Consultation discussion with other provider:Did you involve another provider (consultant, , pharmacy, etc.)?: I discussed the care with another health care provider, see documentation for details.  Admit.    MIPS: Adult Minor Head Trauma:Age 65 years or older            At the conclusion of the encounter I discussed the results of all of the tests and the disposition. The questions were answered. The patient or family acknowledged understanding and was agreeable with the care plan.       MEDICATIONS GIVEN IN THE EMERGENCY:  Medications   lidocaine 1 % 0.1-1 mL (has no administration in time range)   lidocaine (LMX4) cream (has no administration in time range)   sodium chloride (PF) 0.9% PF flush 3 mL (3 mLs Intracatheter $Given 10/30/24 1314)   sodium chloride (PF) 0.9% PF flush 3 mL (has no administration in time range)   HYDROmorphone (DILAUDID) injection 0.2 mg (0.2 mg Intravenous $Given 10/30/24 1356)   iopamidol (ISOVUE-370) solution 75 mL (75 mLs Intravenous $Given 10/30/24 1433)       NEW PRESCRIPTIONS STARTED AT TODAY'S ER VISIT  New Prescriptions    No medications on file          =================================================================    TRIAGE ASSESSMENT:        HPI    Patient information was obtained from: Patient, chart review, EMS and family        Zahra Fitzgerald is a 79 year old female with a pertinent history of UTI on Keflex, DVT on Xarelto who  "presents to this ED for evaluation of ground-level fall    Per EMS has dementia.  Fell hit her head.  Was screaming and was fairly dramatic seen when they arrived.  She was able to be redirected and said she had no pain but then later says she had pain head to toe.  Lives with  who also may have some dementia per EMS          REVIEW OF SYSTEMS   Review of Systems         PAST MEDICAL HISTORY:  No past medical history on file.    PAST SURGICAL HISTORY:  Past Surgical History:   Procedure Laterality Date    ZZC APPENDECTOMY      Description: Appendectomy;  Recorded: 03/12/2009;           CURRENT MEDICATIONS:    atenolol (TENORMIN) 25 MG tablet  cephALEXin (KEFLEX) 500 MG capsule  ondansetron (ZOFRAN ODT) 4 MG ODT tab  rivaroxaban ANTICOAGULANT (XARELTO) 20 MG TABS tablet        ALLERGIES:  No Known Allergies    FAMILY HISTORY:  Family History   Problem Relation Age of Onset    Breast Cancer Mother         Unsure of age    Breast Cancer Maternal Aunt 65.00    Breast Cancer Maternal Aunt 65.00    Breast Cancer Sister 42.00       SOCIAL HISTORY:   Social History     Socioeconomic History    Marital status:    Tobacco Use    Smoking status: Never     Passive exposure: Never    Smokeless tobacco: Never   Vaping Use    Vaping status: Never Used       VITALS:  /64   Pulse 72   Temp 98  F (36.7  C) (Oral)   Resp 21   Ht 1.651 m (5' 5\")   Wt 69 kg (152 lb 3.2 oz)   LMP  (LMP Unknown)   SpO2 100%   BMI 25.33 kg/m      PHYSICAL EXAM      Vitals: /64   Pulse 72   Temp 98  F (36.7  C) (Oral)   Resp 21   Ht 1.651 m (5' 5\")   Wt 69 kg (152 lb 3.2 oz)   LMP  (LMP Unknown)   SpO2 100%   BMI 25.33 kg/m      /64   Pulse 72   Temp 98  F (36.7  C) (Oral)   Resp 21   Ht 1.651 m (5' 5\")   Wt 69 kg (152 lb 3.2 oz)   LMP  (LMP Unknown)   SpO2 100%   BMI 25.33 kg/m      General Appearance: Alert, cooperative, normal speech and facial symmetry,  appears stated age    Primary survey: "     Airway patent  Breath sounds: bilateral breath sounds  Cardiovascular: 2+ radial pulses and DP pulses  Disability GCS 14, confused    Secondary survey    Head:  Normocephalic, without obvious abnormality, atraumatic  Eyes:  PERRL,pupils midsized, conjunctiva/corneas clear, EOM's intact, no orbital injury  ENT:  No obvious facial deformity.  No tenderness to palpation.  No epistaxis.  Extraocular movements are intact.  No evidence of orbital injury.    Neck:  No midline cervical spine tenderness.  No paraspinal tenderness.  Chest:  No tenderness or deformity, no crepitus  Cardio:  Regular rate and rhythm, S1 and S2 normal, no murmur, rub    or gallop, 2+ pulses symmetric in all extremities  Pulm:  Clear to auscultation bilaterally, respirations unlabored,   Back:   no CVA tenderness, no spinal tenderness  Abdomen: Mild abdominal tenderness  Extremities:  No obvious deformity, all joints palpated in place with full range of motion.  Tenderness with movement right hip.  Tenderness to low back  Skin:  Skin color, texture, turgor normal, no rashes or lesions  Neuro:  Awake, alert, responsive to voice, follows commands, normal speech, No gross motor weakness or sensory loss, moves all extremities, general confusion    With assistance of 2 not able to weight-bear or get out of bed    LAB:  All pertinent labs reviewed and interpreted.  Results for orders placed or performed during the hospital encounter of 10/30/24   CT Head w/o Contrast    Impression    IMPRESSION:    1.  No evidence of acute intracranial hemorrhage or mass effect.  2.  Ventriculomegaly out of proportion to the prominence of the sulci which could be due to central predominant volume loss or normal pressure hydrocephalus. Clinical correlation is advised.   3.  Moderate nonspecific white matter changes.   CT Cervical Spine w/o Contrast    Impression    IMPRESSION:  1.  No evidence of acute fracture or subluxation of the cervical spine by CT imaging.   CT  Chest/Abdomen/Pelvis w Contrast    Impression    IMPRESSION:    1.  No evidence of acute traumatic injury within the chest, abdomen, or pelvis. See separately dictated report for findings within the spine.    2.  Diffuse bladder wall thickening, which may be accentuated by underdistention. Correlate with urinalysis to exclude cystitis.    3.  Distal colonic diverticulosis. No inflamed diverticuli.    4.  Sequela of remote granulomatous disease.   CT Lumbar Spine Reconstructed    Impression    IMPRESSION:    Thoracic Spine CT  1.  No evidence of acute fracture or subluxation of the thoracic spine by CT imaging.  2.  S-shaped scoliosis of the thoracic spine.    Lumbar Spine CT:  1.  No evidence of acute fracture or subluxation of the lumbar spine by CT imaging.  2.  Degenerative lumbar spondylosis as described above.   CT Thoracic Spine Reconstructed    Impression    IMPRESSION:    Thoracic Spine CT  1.  No evidence of acute fracture or subluxation of the thoracic spine by CT imaging.  2.  S-shaped scoliosis of the thoracic spine.    Lumbar Spine CT:  1.  No evidence of acute fracture or subluxation of the lumbar spine by CT imaging.  2.  Degenerative lumbar spondylosis as described above.   Comprehensive metabolic panel   Result Value Ref Range    Sodium 144 135 - 145 mmol/L    Potassium 4.2 3.4 - 5.3 mmol/L    Carbon Dioxide (CO2) 27 22 - 29 mmol/L    Anion Gap 11 7 - 15 mmol/L    Urea Nitrogen 21.2 8.0 - 23.0 mg/dL    Creatinine 1.08 (H) 0.51 - 0.95 mg/dL    GFR Estimate 52 (L) >60 mL/min/1.73m2    Calcium 10.0 8.8 - 10.4 mg/dL    Chloride 106 98 - 107 mmol/L    Glucose 102 (H) 70 - 99 mg/dL    Alkaline Phosphatase 80 40 - 150 U/L    AST 21 0 - 45 U/L    ALT 13 0 - 50 U/L    Protein Total 7.2 6.4 - 8.3 g/dL    Albumin 3.9 3.5 - 5.2 g/dL    Bilirubin Total 0.3 <=1.2 mg/dL   Result Value Ref Range    INR 2.40 (H) 0.85 - 1.15   Partial thromboplastin time   Result Value Ref Range    aPTT 40 (H) 22 - 38 Seconds    Result Value Ref Range    Alcohol ethyl <0.01 <=0.01 g/dL   Result Value Ref Range    Troponin T, High Sensitivity 13 <=14 ng/L   CBC with platelets and differential   Result Value Ref Range    WBC Count 6.9 4.0 - 11.0 10e3/uL    RBC Count 4.35 3.80 - 5.20 10e6/uL    Hemoglobin 13.4 11.7 - 15.7 g/dL    Hematocrit 40.1 35.0 - 47.0 %    MCV 92 78 - 100 fL    MCH 30.8 26.5 - 33.0 pg    MCHC 33.4 31.5 - 36.5 g/dL    RDW 13.2 10.0 - 15.0 %    Platelet Count 261 150 - 450 10e3/uL    % Neutrophils 65 %    % Lymphocytes 22 %    % Monocytes 10 %    % Eosinophils 2 %    % Basophils 1 %    % Immature Granulocytes 1 %    NRBCs per 100 WBC 0 <1 /100    Absolute Neutrophils 4.5 1.6 - 8.3 10e3/uL    Absolute Lymphocytes 1.5 0.8 - 5.3 10e3/uL    Absolute Monocytes 0.7 0.0 - 1.3 10e3/uL    Absolute Eosinophils 0.1 0.0 - 0.7 10e3/uL    Absolute Basophils 0.1 0.0 - 0.2 10e3/uL    Absolute Immature Granulocytes 0.0 <=0.4 10e3/uL    Absolute NRBCs 0.0 10e3/uL   Lactic acid whole blood   Result Value Ref Range    Lactic Acid 1.6 0.7 - 2.0 mmol/L   UA with Microscopic reflex to Culture    Specimen: Urine, Catheter   Result Value Ref Range    Color Urine Light Yellow Colorless, Straw, Light Yellow, Yellow    Appearance Urine Clear Clear    Glucose Urine Negative Negative mg/dL    Bilirubin Urine Negative Negative    Ketones Urine Negative Negative mg/dL    Specific Gravity Urine 1.021 1.001 - 1.030    Blood Urine Negative Negative    pH Urine 6.5 5.0 - 7.0    Protein Albumin Urine Negative Negative mg/dL    Urobilinogen Urine <2.0 <2.0 mg/dL    Nitrite Urine Negative Negative    Leukocyte Esterase Urine 250 [employer]ita/uL (A) Negative    Mucus Urine Present (A) None Seen /LPF    RBC Urine 4 (H) <=2 /HPF    WBC Urine 13 (H) <=5 /HPF    Squamous Epithelials Urine 1 <=1 /HPF    Hyaline Casts Urine 1 <=2 /LPF   Symptomatic Influenza A/B, RSV, & SARS-CoV2 PCR (COVID-19) Nose    Specimen: Nose; Swab   Result Value Ref Range    Influenza A PCR Negative  Negative    Influenza B PCR Negative Negative    RSV PCR Negative Negative    SARS CoV2 PCR Negative Negative   Glucose by meter   Result Value Ref Range    GLUCOSE BY METER POCT 105 (H) 70 - 99 mg/dL   Result Value Ref Range    Troponin T, High Sensitivity 12 <=14 ng/L       RADIOLOGY:  Reviewed all pertinent imaging. Please see official radiology report.  CT Lumbar Spine Reconstructed   Final Result   IMPRESSION:     Thoracic Spine CT   1.  No evidence of acute fracture or subluxation of the thoracic spine by CT imaging.   2.  S-shaped scoliosis of the thoracic spine.      Lumbar Spine CT:   1.  No evidence of acute fracture or subluxation of the lumbar spine by CT imaging.   2.  Degenerative lumbar spondylosis as described above.      CT Thoracic Spine Reconstructed   Final Result   IMPRESSION:     Thoracic Spine CT   1.  No evidence of acute fracture or subluxation of the thoracic spine by CT imaging.   2.  S-shaped scoliosis of the thoracic spine.      Lumbar Spine CT:   1.  No evidence of acute fracture or subluxation of the lumbar spine by CT imaging.   2.  Degenerative lumbar spondylosis as described above.      CT Chest/Abdomen/Pelvis w Contrast   Final Result   IMPRESSION:      1.  No evidence of acute traumatic injury within the chest, abdomen, or pelvis. See separately dictated report for findings within the spine.      2.  Diffuse bladder wall thickening, which may be accentuated by underdistention. Correlate with urinalysis to exclude cystitis.      3.  Distal colonic diverticulosis. No inflamed diverticuli.      4.  Sequela of remote granulomatous disease.      CT Cervical Spine w/o Contrast   Final Result   IMPRESSION:   1.  No evidence of acute fracture or subluxation of the cervical spine by CT imaging.      CT Head w/o Contrast   Final Result   IMPRESSION:     1.  No evidence of acute intracranial hemorrhage or mass effect.   2.  Ventriculomegaly out of proportion to the prominence of the sulci  which could be due to central predominant volume loss or normal pressure hydrocephalus. Clinical correlation is advised.    3.  Moderate nonspecific white matter changes.          EKG:    Performed at: 10/30/2024 13:14:51    Impression: Sinus rhythm, Normal ECG    Rate: 68  Rhythm: Sinus rhythm  Axis: 30  [place] Interval: 164  QRS Interval: 72  QTc Interval: 421  ST Changes: None  Comparison: No significant changes found when compared to ECG of 7/27/2024 23:48    I have independently reviewed and interpreted the EKG(s) documented above.          I, Cari Urias, am serving as a scribe to document services personally performed by Pedro Velásquez MD based on my observation and the provider's statements to me. I, Pedro Velásquez MD, attest that Cari Urias is acting in a scribe capacity, has observed my performance of the services and has documented them in accordance with my direction.    Pedro Velásquez MD  [facility] EMERGENCY DEPARTMENT  [address] [place] [id number] [phone]        Pedro Velásquez MD  10/30/24 1547       Pedro Velásquez MD  10/30/24 1555       Pedro Velásquez MD  10/30/24 5844

## 2024-10-30 NOTE — PHARMACY-ADMISSION MEDICATION HISTORY
Pharmacist Admission Medication History    Admission medication history is complete. The information provided in this note is only as accurate as the sources available at the time of the update.    Information Source(s): Family member and CareEverywhere/SureScripts via in-person    Pertinent Information: The patient's  states that Seroquel doesn't work, even when it was increased to 25mg at bedtime, so he doesn't give it to her. She also does not take simvastatin or aspirin.    Changes made to PTA medication list:  Added: None  Deleted: Seroquel, simvastatin, aspirin  Changed: None    Allergies reviewed with patient and updates made in EHR: yes    Medication History Completed By: Maris Alegre RPH 10/30/2024 1:56 PM    Current Facility-Administered Medications for the 10/30/24 encounter (Hospital Encounter)   Medication    denosumab (PROLIA) injection 60 mg     PTA Med List   Medication Sig Last Dose/Taking    atenolol (TENORMIN) 25 MG tablet TAKE ONE TABLET BY MOUTH ONE TIME DAILY 10/29/2024 Bedtime    cephALEXin (KEFLEX) 500 MG capsule Take 1 capsule (500 mg) by mouth 2 times daily for 7 days. 10/29/2024 Bedtime    ondansetron (ZOFRAN ODT) 4 MG ODT tab Take 1 tablet (4 mg) by mouth every 8 hours as needed for nausea or vomiting Unknown    rivaroxaban ANTICOAGULANT (XARELTO) 20 MG TABS tablet Take 1 tablet (20 mg) by mouth daily (with dinner) 10/29/2024 Evening

## 2024-10-31 NOTE — TELEPHONE ENCOUNTER
Attempt #2 to reach patient, her  Blas answered the phone Consent to communicate on file. Relayed PCP message, Blas notes there is a nurse coming today and he will discuss the medication with her and call back with any questions or concerns

## 2024-11-01 LAB — BACTERIA UR CULT: NO GROWTH

## 2024-11-02 LAB
ATRIAL RATE - MUSE: 68 BPM
DIASTOLIC BLOOD PRESSURE - MUSE: 67 MMHG
INTERPRETATION ECG - MUSE: NORMAL
P AXIS - MUSE: 29 DEGREES
PR INTERVAL - MUSE: 164 MS
QRS DURATION - MUSE: 72 MS
QT - MUSE: 396 MS
QTC - MUSE: 421 MS
R AXIS - MUSE: 30 DEGREES
SYSTOLIC BLOOD PRESSURE - MUSE: 137 MMHG
T AXIS - MUSE: 18 DEGREES
VENTRICULAR RATE- MUSE: 68 BPM

## 2024-11-04 LAB — BACTERIA BLD CULT: NO GROWTH

## 2024-11-26 DIAGNOSIS — I82.4Y1 ACUTE DEEP VEIN THROMBOSIS (DVT) OF PROXIMAL VEIN OF RIGHT LOWER EXTREMITY (H): ICD-10-CM

## 2024-11-27 RX ORDER — RIVAROXABAN 20 MG/1
20 TABLET, FILM COATED ORAL
Qty: 90 TABLET | Refills: 0 | OUTPATIENT
Start: 2024-11-27

## 2024-11-27 NOTE — TELEPHONE ENCOUNTER
Outgoing call to patient, spoke with her spouse, relayed provider message. Agreeing to plan. No further questions at this time.     Patient last dose will be this Friday 11/29/24.   US is scheduled on 12/4/24.     Horace CORNEJO RN

## 2024-11-27 NOTE — TELEPHONE ENCOUNTER
Based off of previous provider note on 8/8/2024 Xarelto was post to be continued for 3 months from that visit.  The plan was for her to have follow-up ultrasound at that time to ensure resolution.  We will wait refilling this medication until after the ultrasound to determine if ongoing use is necessary.    Celia Barnard, KRYSTAL